# Patient Record
Sex: MALE | Race: ASIAN | NOT HISPANIC OR LATINO | Employment: FULL TIME | URBAN - METROPOLITAN AREA
[De-identification: names, ages, dates, MRNs, and addresses within clinical notes are randomized per-mention and may not be internally consistent; named-entity substitution may affect disease eponyms.]

---

## 2017-01-10 ENCOUNTER — OFFICE VISIT (OUTPATIENT)
Dept: DERMATOLOGY | Facility: CLINIC | Age: 57
End: 2017-01-10

## 2017-01-10 DIAGNOSIS — L40.9 PSORIASIS: Primary | ICD-10-CM

## 2017-01-10 ASSESSMENT — PAIN SCALES - GENERAL
PAINLEVEL: NO PAIN (0)
PAINLEVEL: NO PAIN (1)

## 2017-01-10 NOTE — NURSING NOTE
"Dermatology Rooming Note    Damian Tejada's goals for this visit include:   Chief Complaint   Patient presents with     Derm Problem     Damian is here today for a rash. He states \" I used the medication and it helped my rash\"             Linette Ramirez, TORI      "

## 2017-01-10 NOTE — PROGRESS NOTES
[facility] Dermatology Note      Dermatology Problem List:  1. Circular, erythematous plaque left scalp; likely isolated scalp psoriasis  2. Irritated seborrheic keratosis left cheek, LN2 on 11/29/16    Encounter Date: Emeterio 10, 2017    CC:    Chief Complaint    Patient presents with       Derm Problem        Follow up on scalp itch       History of Present Illness:  Mr. Damian Tejada is a 56 year old male here for followup on localized area of itchy spot on scalp.  First seen 11/19/16, suspected to be psoriasis or LSC, though felt important to cont ongoing clinical monitoring for other entities such as follicular mucinosis.  Today pt reports he used clobetasol oint bid for three weeks and has had significant improvement.  Also had several irritated SKs of face frozen at last visit, these have improved.      Past Medical History:    Patient Active Problem List    Diagnosis       Lumbago       Essential hypertension       ST elevation (STEMI) myocardial infarction involving left anterior descending coronary artery (H)       Hyperlipidemia LDL goal <70       Past Medical History     Past Medical History    Diagnosis  Date       Hypertension         Hyperlipidemia LDL goal <70         Coronary artery disease  8/14/16        anterior STEMI s/p NATY X2       BPH (benign prostatic hyperplasia)            Past Surgical History     Past Surgical History    Procedure  Laterality  Date       Hemorrhoidectomy    Summer 2008       Proctoplasty    Summer 2008       Rectal surgery               Social History:  The patient works as a  in [employer].     Family History:  There is no family history of skin cancer.    Medications:   Current Outpatient Prescriptions     Current Outpatient Prescriptions    Medication  Sig  Dispense  Refill       clobetasol (TEMOVATE) 0.05 % cream  Apply topically 2 times daily  60 g  0       clopidogrel (PLAVIX) 75 MG tablet  600 MG (8 pills) first day loading dose,  then 75 MG once daily.  98 tablet  3       aspirin EC 81 MG EC tablet  Take 1 tablet (81 mg) by mouth daily  90 tablet  3       nitroglycerin (NITROSTAT) 0.4 MG SL tablet  Place 1 tablet (0.4 mg) under the tongue every 5 minutes as needed for chest pain  25 tablet  3       atorvastatin (LIPITOR) 80 MG tablet  Take 1 tablet (80 mg) by mouth daily  90 tablet  3       carvedilol (COREG) 3.125 MG tablet  Take 1 tablet (3.125 mg) by mouth 2 times daily (with meals)  90 tablet  3       tamsulosin (FLOMAX) 0.4 MG 24 hr capsule  Take 1 capsule (0.4 mg) by mouth daily  90 capsule  3       lisinopril (PRINIVIL,ZESTRIL) 10 MG tablet  Take 1 tablet (10 mg) by mouth daily  30 tablet  1                       No Known Allergies      Review of Systems:  -Constitutional: The patient denies fatigue, fevers, chills, unintended weight loss, and night sweats.  -HEENT: Patient denies nonhealing oral sores.    Physical exam:  Vitals: There were no vitals taken for this visit.  GEN: This is a well developed, well-nourished male in no acute distress, in a pleasant mood.     SKIN: Focused examination of the face and head was performed.  -On the left parietal scalp, there is an isolated ~6 cm circular scaly, mildly erythematous plaque  -No other lesions of concern on areas examined.     Impression/Plan:  1. Circular, erythematous plaque left scalp; consistent with psoriasis or LSC.  Significantly improved with empiric clobetasol oint x 3 weeks.  May continue clobetasol oint bid on an as-needed basis.    2. SKs of face - improved with cryotherapy at last visit.  Reassurance as to benign nature.      Follow-up in 3 months, earlier for new or changing lesions.               Tacho Harvey MD  Dermatology Attending

## 2017-01-10 NOTE — PATIENT INSTRUCTIONS
Cryotherapy    What is it?    Use of a very cold liquid, such as liquid nitrogen, to freeze and destroy abnormal skin cells that need to be removed    What should I expect?    Tenderness and redness    A small blister that might grow and fill with dark purple blood. There may be crusting.    More than one treatment may be needed if the lesions do not go away.    How do I care for the treated area?    Gently wash the area with your hands when bathing.    Use a thin layer of Vaselin to help with healing. You may use a Band-Aid.     The area should heal within 7-10 days and may leave behind a pink or lighter color.     Do not use an antibiotic or Neosporin ointment.     You may take acetaminophen (Tylenol) for pain.     Call your Doctor if you have:    Severe pain    Signs of infection (warmth, redness, cloudy yellow drainage, and or a bad smell)    Questions or concerns    Who should I call with questions?       [facility]: [phone]       [facility]: [phone]       For urgent needs outside of business hours call the [facility] at [phone]        and ask for the dermatology resident on call

## 2017-01-10 NOTE — MR AVS SNAPSHOT
After Visit Summary   1/10/2017    Damian Tejada    MRN: [id number]           Patient Information     Date Of Birth          1960        Visit Information        Provider Department      1/10/2017 4:15 PM Tacho Harvey MD [facility]        Care Instructions    Cryotherapy    What is it?    Use of a very cold liquid, such as liquid nitrogen, to freeze and destroy abnormal skin cells that need to be removed    What should I expect?    Tenderness and redness    A small blister that might grow and fill with dark purple blood. There may be crusting.    More than one treatment may be needed if the lesions do not go away.    How do I care for the treated area?    Gently wash the area with your hands when bathing.    Use a thin layer of Vaselin to help with healing. You may use a Band-Aid.     The area should heal within 7-10 days and may leave behind a pink or lighter color.     Do not use an antibiotic or Neosporin ointment.     You may take acetaminophen (Tylenol) for pain.     Call your Doctor if you have:    Severe pain    Signs of infection (warmth, redness, cloudy yellow drainage, and or a bad smell)    Questions or concerns    Who should I call with questions?       [facility]: [phone]       [facility]: [phone]       For urgent needs outside of business hours call the [facility] at [phone]        and ask for the dermatology resident on call        Follow-ups after your visit        Your next 10 appointments already scheduled     Feb 23, 2017  4:15 PM   (Arrive by 4:00 PM)   Return Visit with Tacho Harvey MD   [facility] ([facility])    [address] [place] [id number] [phone]              Who to contact     Please call your clinic at [phone] to:    Ask questions about your health    Make or cancel appointments    Discuss your  medicines    Learn about your test results    Speak to your doctor   If you have compliments or concerns about an experience at your clinic, or if you wish to file a complaint, please contact [facility] Patient Relations at [phone] or email us at [email or web address]         Additional Information About Your Visit        Care EveryWhere ID     This is your Care EveryWhere ID. This could be used by other organizations to access your [facility] medical records  [id number]         Blood Pressure from Last 3 Encounters:   12/21/16 125/86   12/13/16 118/74   10/17/16 125/82    Weight from Last 3 Encounters:   12/21/16 83.915 kg (185 lb)   12/13/16 78.926 kg (174 lb)   10/17/16 83.915 kg (185 lb)              Today, you had the following     No orders found for display         Today's Medication Changes          These changes are accurate as of: 1/10/17  4:55 PM.  If you have any questions, ask your nurse or doctor.               Stop taking these medicines if you haven't already. Please contact your care team if you have questions.     clobetasol 0.05 % cream   Commonly known as:  TEMOVATE   Stopped by:  Tacho Harvey MD                    Primary Care Provider Office Phone # Fax #    Italo Helms [phone]       [address] [address]        Thank you!     Thank you for choosing [facility]  for your care. Our goal is always to provide you with excellent care. Hearing back from our patients is one way we can continue to improve our services. Please take a few minutes to complete the written survey that you may receive in the mail after your visit with us. Thank you!             Your Updated Medication List - Protect others around you: Learn how to safely use, store and throw away your medicines at www.disposemymeds.org.          This list is accurate as of: 1/10/17  4:55 PM.  Always use your most recent med  list.                   Brand Name Dispense Instructions for use    aspirin 81 MG EC tablet     90 tablet    Take 1 tablet (81 mg) by mouth daily       atorvastatin 80 MG tablet    LIPITOR    90 tablet    Take 1 tablet (80 mg) by mouth daily       carvedilol 3.125 MG tablet    COREG    90 tablet    Take 1 tablet (3.125 mg) by mouth 2 times daily (with meals)       clopidogrel 75 MG tablet    PLAVIX    98 tablet    600 MG (8 pills) first day loading dose, then 75 MG once daily.       nitroglycerin 0.4 MG sublingual tablet    NITROSTAT    25 tablet    Place 1 tablet (0.4 mg) under the tongue every 5 minutes as needed for chest pain       tamsulosin 0.4 MG capsule    FLOMAX    90 capsule    Take 1 capsule (0.4 mg) by mouth daily

## 2017-01-10 NOTE — Clinical Note
1/10/2017       RE: Damian Tejada  [address]  [place] [address]     Dear Colleague,    Thank you for referring your patient, Damian Tejada, to the [facility] DERMATOLOGY at [facility]. Please see a copy of my visit note below.    [facility] Dermatology Note      Dermatology Problem List:  1. Circular, erythematous plaque left scalp; likely isolated scalp psoriasis  2. Irritated seborrheic keratosis left cheek, LN2 on 11/29/16    Encounter Date: Emeterio 10, 2017    CC:    Chief Complaint    Patient presents with       Derm Problem        Follow up on scalp itch       History of Present Illness:  Mr. Damian Tejada is a 56 year old male here for followup on localized area of itchy spot on scalp.  First seen 11/19/16, suspected to be psoriasis or LSC, though felt important to cont ongoing clinical monitoring for other entities such as follicular mucinosis.  Today pt reports he used clobetasol oint bid for three weeks and has had significant improvement.  Also had several irritated SKs of face frozen at last visit, these have improved.      Past Medical History:    Patient Active Problem List    Diagnosis       Lumbago       Essential hypertension       ST elevation (STEMI) myocardial infarction involving left anterior descending coronary artery (H)       Hyperlipidemia LDL goal <70       Past Medical History     Past Medical History    Diagnosis  Date       Hypertension         Hyperlipidemia LDL goal <70         Coronary artery disease  8/14/16        anterior STEMI s/p NATY X2       BPH (benign prostatic hyperplasia)            Past Surgical History     Past Surgical History    Procedure  Laterality  Date       Hemorrhoidectomy    Summer 2008       Proctoplasty    Summer 2008       Rectal surgery               Social History:  The patient works as a  in [employer].     Family History:  There is no family history of skin  cancer.    Medications:   Current Outpatient Prescriptions     Current Outpatient Prescriptions    Medication  Sig  Dispense  Refill       clobetasol (TEMOVATE) 0.05 % cream  Apply topically 2 times daily  60 g  0       clopidogrel (PLAVIX) 75 MG tablet  600 MG (8 pills) first day loading dose, then 75 MG once daily.  98 tablet  3       aspirin EC 81 MG EC tablet  Take 1 tablet (81 mg) by mouth daily  90 tablet  3       nitroglycerin (NITROSTAT) 0.4 MG SL tablet  Place 1 tablet (0.4 mg) under the tongue every 5 minutes as needed for chest pain  25 tablet  3       atorvastatin (LIPITOR) 80 MG tablet  Take 1 tablet (80 mg) by mouth daily  90 tablet  3       carvedilol (COREG) 3.125 MG tablet  Take 1 tablet (3.125 mg) by mouth 2 times daily (with meals)  90 tablet  3       tamsulosin (FLOMAX) 0.4 MG 24 hr capsule  Take 1 capsule (0.4 mg) by mouth daily  90 capsule  3       lisinopril (PRINIVIL,ZESTRIL) 10 MG tablet  Take 1 tablet (10 mg) by mouth daily  30 tablet  1                       No Known Allergies      Review of Systems:  -Constitutional: The patient denies fatigue, fevers, chills, unintended weight loss, and night sweats.  -HEENT: Patient denies nonhealing oral sores.    Physical exam:  Vitals: There were no vitals taken for this visit.  GEN: This is a well developed, well-nourished male in no acute distress, in a pleasant mood.     SKIN: Focused examination of the face and head was performed.  -On the left parietal scalp, there is an isolated ~6 cm circular scaly, mildly erythematous plaque  -No other lesions of concern on areas examined.     Impression/Plan:  1. Circular, erythematous plaque left scalp; consistent with psoriasis or LSC.  Significantly improved with empiric clobetasol oint x 3 weeks.  May continue clobetasol oint bid on an as-needed basis.    2. SKs of face - improved with cryotherapy at last visit.  Reassurance as to benign nature.      Follow-up in 3 months, earlier for new or changing  lesions.               Tacho Harvey MD  Dermatology Attending

## 2017-02-23 ENCOUNTER — OFFICE VISIT (OUTPATIENT)
Dept: DERMATOLOGY | Facility: CLINIC | Age: 57
End: 2017-02-23

## 2017-02-23 DIAGNOSIS — L81.0 POSTINFLAMMATORY HYPERPIGMENTATION: Primary | ICD-10-CM

## 2017-02-23 DIAGNOSIS — L40.9 PSORIASIS: ICD-10-CM

## 2017-02-23 ASSESSMENT — PAIN SCALES - GENERAL: PAINLEVEL: NO PAIN (0)

## 2017-02-23 NOTE — PROGRESS NOTES
[facility] Dermatology Note      Dermatology Problem List:  1. Circular, erythematous plaque left scalp; possible isolated psoriasis. Resolved with clobetasol.  2. Irritated seborrheic keratosis left cheek: resolved with LN with some continued PIH.    Encounter Date: Feb 23, 2017    CC:   No chief complaint on file.        History of Present Illness:  Mr. Damian Tejada is a 57 year old male who presents as a follow-up for spot on his cheek. The patient was last seen 11/29/16 when he was started on clobetasol and treated with LN. Pt is concerned as he continues to have a dark spot on his cheek in area he was treated with LN for a SK. He is worried SK may have come back. Pt otherwise feels the spot on his scalp has resolved. Pt otherwise feels well without any changes in general state of health. Denies any new, growing, changing, bleeding, or otherwise concerning/symptomatic skin findings. No other questions or concerns today.        Past Medical History:   Patient Active Problem List   Diagnosis     Lumbago     Essential hypertension     ST elevation (STEMI) myocardial infarction involving left anterior descending coronary artery (H)     Hyperlipidemia LDL goal <70     Psoriasis     Seborrheic keratoses, inflamed     Past Medical History   Diagnosis Date     BPH (benign prostatic hyperplasia)      Coronary artery disease 8/14/16     anterior STEMI s/p NATY X2     Hyperlipidemia LDL goal <70      Hypertension      Past Surgical History   Procedure Laterality Date     Hemorrhoidectomy  Summer 2008     Proctoplasty  Summer 2008     Rectal surgery         Social History:  The patient works as a  in [employer].      Family History:  There is no family history of skin cancer.    Medications:  Current Outpatient Prescriptions   Medication Sig Dispense Refill     carvedilol (COREG) 3.125 MG tablet Take 1 tablet (3.125 mg) by mouth 2 times daily (with meals) 90 tablet 3     tamsulosin  (FLOMAX) 0.4 MG 24 hr capsule Take 1 capsule (0.4 mg) by mouth daily 90 capsule 3     clopidogrel (PLAVIX) 75 MG tablet 600 MG (8 pills) first day loading dose, then 75 MG once daily. 98 tablet 3     aspirin EC 81 MG EC tablet Take 1 tablet (81 mg) by mouth daily 90 tablet 3     nitroglycerin (NITROSTAT) 0.4 MG SL tablet Place 1 tablet (0.4 mg) under the tongue every 5 minutes as needed for chest pain 25 tablet 3     atorvastatin (LIPITOR) 80 MG tablet Take 1 tablet (80 mg) by mouth daily 90 tablet 3        No Known Allergies      Review of Systems:  -As per HPI  -Constitutional: The patient denies fatigue, fevers, chills, unintended weight loss, and night sweats.  -HEENT: Patient denies nonhealing oral sores.  -Skin: As above in HPI. No additional skin concerns.    Physical exam:  Vitals: There were no vitals taken for this visit.  GEN: This is a well developed, well-nourished male in no acute distress, in a pleasant mood.    SKIN: Focused examination of the scalp, face was performed.  - some minor flaking with no underlying residual patch or plaque on left scalp in area of previous concern for psoriasis   - hyperpigmented patch on central left cheek in area previously treated with LN  - no other lesions of concern on areas examined.     Impression/Plan:    1. Circular, erythematous plaque left scalp; suspected psoriasis. Resolved with Clobetasol. May continue using PRN     2. Post-inflammatory hyperpigmentation: In area that SK was previously treated with LN. Reassured patient that this will fade with time.         Follow-up prn for new or changing lesions.       Dr. Tacho Harvey staffed the patient.    Staff Involved:  Resident(Joey Walker)/Staff(as above)    I have seen and examined this patient and agree with the assessment and plan as documented in the resident's note.    Tacho Harvey MD  Dermatology Attending

## 2017-02-23 NOTE — NURSING NOTE
"Dermatology Rooming Note    Damian S Eastonon's goals for this visit include:   Chief Complaint   Patient presents with     Derm Problem     Damian is here today for spots on his face. He states \" I had a spot on my face that was treated with cryotherapy. The area looked good for one week than the color came back.\"           TORI Ontiveros    "

## 2017-02-23 NOTE — LETTER
2/23/2017       RE: Damian Tejada  [address]  [place] [address]     Dear Colleague,    Thank you for referring your patient, Damian Tejada, to the [facility] DERMATOLOGY at [facility]. Please see a copy of my visit note below.    [facility] Dermatology Note      Dermatology Problem List:  1. Circular, erythematous plaque left scalp; possible isolated psoriasis. Resolved with clobetasol.  2. Irritated seborrheic keratosis left cheek: resolved with LN with some continued PIH.    Encounter Date: Feb 23, 2017    CC:   No chief complaint on file.        History of Present Illness:  Mr. Damian Tejada is a 57 year old male who presents as a follow-up for spot on his cheek. The patient was last seen 11/29/16 when he was started on clobetasol and treated with LN. Pt is concerned as he continues to have a dark spot on his cheek in area he was treated with LN for a SK. He is worried SK may have come back. Pt otherwise feels the spot on his scalp has resolved. Pt otherwise feels well without any changes in general state of health. Denies any new, growing, changing, bleeding, or otherwise concerning/symptomatic skin findings. No other questions or concerns today.        Past Medical History:   Patient Active Problem List   Diagnosis     Lumbago     Essential hypertension     ST elevation (STEMI) myocardial infarction involving left anterior descending coronary artery (H)     Hyperlipidemia LDL goal <70     Psoriasis     Seborrheic keratoses, inflamed     Past Medical History   Diagnosis Date     BPH (benign prostatic hyperplasia)      Coronary artery disease 8/14/16     anterior STEMI s/p NATY X2     Hyperlipidemia LDL goal <70      Hypertension      Past Surgical History   Procedure Laterality Date     Hemorrhoidectomy  Summer 2008     Proctoplasty  Summer 2008     Rectal surgery         Social History:  The patient works as a  in [place].      Family History:  There is no family history of skin cancer.    Medications:  Current Outpatient Prescriptions   Medication Sig Dispense Refill     carvedilol (COREG) 3.125 MG tablet Take 1 tablet (3.125 mg) by mouth 2 times daily (with meals) 90 tablet 3     tamsulosin (FLOMAX) 0.4 MG 24 hr capsule Take 1 capsule (0.4 mg) by mouth daily 90 capsule 3     clopidogrel (PLAVIX) 75 MG tablet 600 MG (8 pills) first day loading dose, then 75 MG once daily. 98 tablet 3     aspirin EC 81 MG EC tablet Take 1 tablet (81 mg) by mouth daily 90 tablet 3     nitroglycerin (NITROSTAT) 0.4 MG SL tablet Place 1 tablet (0.4 mg) under the tongue every 5 minutes as needed for chest pain 25 tablet 3     atorvastatin (LIPITOR) 80 MG tablet Take 1 tablet (80 mg) by mouth daily 90 tablet 3        No Known Allergies      Review of Systems:  -As per HPI  -Constitutional: The patient denies fatigue, fevers, chills, unintended weight loss, and night sweats.  -HEENT: Patient denies nonhealing oral sores.  -Skin: As above in HPI. No additional skin concerns.    Physical exam:  Vitals: There were no vitals taken for this visit.  GEN: This is a well developed, well-nourished male in no acute distress, in a pleasant mood.    SKIN: Focused examination of the scalp, face was performed.  - some minor flaking with no underlying residual patch or plaque on left scalp in area of previous concern for psoriasis   - hyperpigmented patch on central left cheek in area previously treated with LN  - no other lesions of concern on areas examined.     Impression/Plan:    1. Circular, erythematous plaque left scalp; suspected psoriasis. Resolved with Clobetasol. May continue using PRN     2. Post-inflammatory hyperpigmentation: In area that SK was previously treated with LN. Reassured patient that this will fade with time.     Follow-up prn for new or changing lesions.     Dr. Tacho Harvey staffed the patient.    Staff Involved:  Resident(Joey  Wili Walker)/Staff(as above)    I have seen and examined this patient and agree with the assessment and plan as documented in the resident's note.    Tacho Harvey MD  Dermatology Attending

## 2017-02-23 NOTE — MR AVS SNAPSHOT
After Visit Summary   2/23/2017    Damian Tejada    MRN: [id number]           Patient Information     Date Of Birth          1960        Visit Information        Provider Department      2/23/2017 4:15 PM Tacho Harvey MD [facility]        Today's Diagnoses     Postinflammatory hyperpigmentation    -  1    Psoriasis           Follow-ups after your visit        Your next 10 appointments already scheduled     Apr 20, 2017  9:30 AM CDT   Walk In From ENT with Linh Pearson [facility] ([facility])    [address] [place] [address] [phone]            Apr 20, 2017 10:30 AM CDT   (Arrive by 10:15 AM)   New Patient Visit with Kwame Bain MD   [facility] ([facility])    [address] [place] [address] [phone]              Who to contact     Please call your clinic at [phone] to:    Ask questions about your health    Make or cancel appointments    Discuss your medicines    Learn about your test results    Speak to your doctor   If you have compliments or concerns about an experience at your clinic, or if you wish to file a complaint, please contact [facility] Patient Relations at [phone] or email us at [email or web address]         Additional Information About Your Visit        Care EveryWhere ID     This is your Care EveryWhere ID. This could be used by other organizations to access your [facility] medical records  [id number]         Blood Pressure from Last 3 Encounters:   03/05/17 (!) 152/94   12/21/16 125/86   12/13/16 118/74    Weight from Last 3 Encounters:   03/05/17 81.6 kg (180 lb)   12/21/16 83.9 kg (185 lb)   12/13/16 78.9 kg (174 lb)              Today, you had the following     No orders found for display       Primary Care Provider Office Phone # Fax #    Italo Lopez  MD Anal[place] [phone]       [address] [address]        Thank you!     Thank you for choosing [facility]  for your care. Our goal is always to provide you with excellent care. Hearing back from our patients is one way we can continue to improve our services. Please take a few minutes to complete the written survey that you may receive in the mail after your visit with us. Thank you!             Your Updated Medication List - Protect others around you: Learn how to safely use, store and throw away your medicines at www.disposemymeds.org.          This list is accurate as of: 2/23/17 11:59 PM.  Always use your most recent med list.                   Brand Name Dispense Instructions for use    aspirin 81 MG EC tablet     90 tablet    Take 1 tablet (81 mg) by mouth daily       atorvastatin 80 MG tablet    LIPITOR    90 tablet    Take 1 tablet (80 mg) by mouth daily       carvedilol 3.125 MG tablet    COREG    90 tablet    Take 1 tablet (3.125 mg) by mouth 2 times daily (with meals)       clopidogrel 75 MG tablet    PLAVIX    98 tablet    600 MG (8 pills) first day loading dose, then 75 MG once daily.       nitroglycerin 0.4 MG sublingual tablet    NITROSTAT    25 tablet    Place 1 tablet (0.4 mg) under the tongue every 5 minutes as needed for chest pain       tamsulosin 0.4 MG capsule    FLOMAX    90 capsule    Take 1 capsule (0.4 mg) by mouth daily

## 2017-03-05 ENCOUNTER — HOSPITAL ENCOUNTER (EMERGENCY)
Facility: CLINIC | Age: 57
Discharge: HOME | End: 2017-03-05
Attending: EMERGENCY MEDICINE | Admitting: EMERGENCY MEDICINE
Payer: COMMERCIAL

## 2017-03-05 VITALS
WEIGHT: 180 LBS | RESPIRATION RATE: 20 BRPM | HEART RATE: 81 BPM | SYSTOLIC BLOOD PRESSURE: 152 MMHG | DIASTOLIC BLOOD PRESSURE: 94 MMHG | TEMPERATURE: 97.6 F | BODY MASS INDEX: 28.25 KG/M2 | OXYGEN SATURATION: 100 % | HEIGHT: 67 IN

## 2017-03-05 DIAGNOSIS — H93.12 TINNITUS OF LEFT EAR: ICD-10-CM

## 2017-03-05 PROCEDURE — 99282 EMERGENCY DEPT VISIT SF MDM: CPT | Mod: Z6 | Performed by: EMERGENCY MEDICINE

## 2017-03-05 PROCEDURE — 99282 EMERGENCY DEPT VISIT SF MDM: CPT | Performed by: EMERGENCY MEDICINE

## 2017-03-05 ASSESSMENT — ENCOUNTER SYMPTOMS
TREMORS: 0
LIGHT-HEADEDNESS: 0
DIZZINESS: 0
ABDOMINAL PAIN: 0
NUMBNESS: 0
WEAKNESS: 0
FEVER: 0
SHORTNESS OF BREATH: 0
WOUND: 0
FACIAL ASYMMETRY: 0

## 2017-03-05 NOTE — ED NOTES
"Pt A&Ox4. Pt reports \"hissing\" sound in left ear that started about 3 weeks ago. Initially the sound would occur in the early morning before falling asleep and would not be present upon awaking. Now the \"hissing\" in his left ear has been constant x2 days. Pt denies hearing loss. No recent illness or injury.  "

## 2017-03-05 NOTE — ED AVS SNAPSHOT
[facility], Emergency Department    [address] [address]    Phone:  [phone]                                       Damian Tejada   MRN: [id number]    Department:  [facility], Emergency Department   Date of Visit:  3/5/2017           Patient Information     Date Of Birth          1960        Your diagnoses for this visit were:     Tinnitus of left ear        You were seen by Torin Byrd MD.      Follow-up Information     Call Italo Helms MD.    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  This is most likely tinnitus.  You need to see an ear, nose, and throat doctor (ENT or Otolarynologist) to better determine what is causing your symptoms    Please make an appointment to follow up with [facility] (phone: [phone]) as soon as possible    Call your primary care doctor in 1 day to let them know you were seen in the ED.  Follow up with them as well.               Discharge References/Attachments     TINNITUS (RINGING IN THE EARS) (ENGLISH)      24 Hour Appointment Hotline       To make an appointment at any [facility] clinic, call [phone] ([phone]). If you don't have a family doctor or clinic, we will help you find one. [facility] clinics are conveniently located to serve the needs of you and your family.             Review of your medicines      Our records show that you are taking the medicines listed below. If these are incorrect, please call your family doctor or clinic.        Dose / Directions Last dose taken    aspirin 81 MG EC tablet   Dose:  81 mg   Quantity:  90 tablet        Take 1 tablet (81 mg) by mouth daily   Refills:  3        atorvastatin 80 MG tablet   Commonly  known as:  LIPITOR   Dose:  80 mg   Quantity:  90 tablet        Take 1 tablet (80 mg) by mouth daily   Refills:  3        carvedilol 3.125 MG tablet   Commonly known as:  COREG   Dose:  3.125 mg   Quantity:  90 tablet        Take 1 tablet (3.125 mg) by mouth 2 times daily (with meals)   Refills:  3        clopidogrel 75 MG tablet   Commonly known as:  PLAVIX   Quantity:  98 tablet        600 MG (8 pills) first day loading dose, then 75 MG once daily.   Refills:  3        nitroglycerin 0.4 MG sublingual tablet   Commonly known as:  NITROSTAT   Dose:  0.4 mg   Quantity:  25 tablet        Place 1 tablet (0.4 mg) under the tongue every 5 minutes as needed for chest pain   Refills:  3        tamsulosin 0.4 MG capsule   Commonly known as:  FLOMAX   Dose:  0.4 mg   Quantity:  90 capsule        Take 1 capsule (0.4 mg) by mouth daily   Refills:  3                Orders Needing Specimen Collection     None      Pending Results     No orders found from 3/3/2017 to 3/6/2017.            Pending Culture Results     No orders found from 3/3/2017 to 3/6/2017.            Thank you for choosing [facility]       Thank you for choosing [facility] for your care. Our goal is always to provide you with excellent care. Hearing back from our patients is one way we can continue to improve our services. Please take a few minutes to complete the written survey that you may receive in the mail after you visit with us. Thank you!        Care EveryWhere ID     This is your Care EveryWhere ID. This could be used by other organizations to access your [facility] medical records  [id number]        After Visit Summary       This is your record. Keep this with you and show to your community pharmacist(s) and doctor(s) at your next visit.

## 2017-03-05 NOTE — ED AVS SNAPSHOT
[facility], [place], Emergency Department    [address] [place] [address]    Phone:  [phone]                                       Damian Tejada   MRN: [id number]    Department:  [facility], Emergency Department   Date of Visit:  3/5/2017           After Visit Summary Signature Page     I have received my discharge instructions, and my questions have been answered. I have discussed any challenges I see with this plan with the nurse or doctor.    ..........................................................................................................................................  Patient/Patient Representative Signature      ..........................................................................................................................................  Patient Representative Print Name and Relationship to Patient    ..................................................               ................................................  Date                                            Time    ..........................................................................................................................................  Reviewed by Signature/Title    ...................................................              ..............................................  Date                                                            Time

## 2017-03-05 NOTE — ED PROVIDER NOTES
History     Chief Complaint   Patient presents with     Hearing Problem     HPI  Damian Tejada is a 57 year old male with a history of hyperlipidemia, hypertension and MI who presents to the Emergency Department with abnormal hearing sensations out of his left ear. Patient states intermittently over the past 3 weeks he has noticed a hissing sound right before he goes to bed. He has been on vacation but working on computer programing projects late at night and so he has been up until 4 or 5 AM. He has noticed that around 4 or 5 AM he will hear a hissing sound in his left ear before he goes to bed. He has not experienced this sensation throughout the day at all. Over the past 2 days the hissing has been constant, located in his left ear. He also has this fullness sensation on the left side of his head. He denies any ear pain, headache, vision changes, balance difficulties or problems with coordination or walking, nausea, vomiting. Has never had symptoms like this before. No history of stroke. No recent congestion, sore throat or fevers.     This part of the document was transcribed by Bebe Holcomb, Medical Scribe.  Past Medical History   Diagnosis Date     BPH (benign prostatic hyperplasia)      Coronary artery disease 8/14/16     anterior STEMI s/p NATY X2     Hyperlipidemia LDL goal <70      Hypertension        Past Surgical History   Procedure Laterality Date     Hemorrhoidectomy  Summer 2008     Proctoplasty  Summer 2008     Rectal surgery         Family History   Problem Relation Age of Onset     Skin Cancer No family hx of      Melanoma No family hx of        Social History   Substance Use Topics     Smoking status: Never Smoker     Smokeless tobacco: Never Used     Alcohol use No       No current facility-administered medications for this encounter.      Current Outpatient Prescriptions   Medication     carvedilol (COREG) 3.125 MG tablet     tamsulosin (FLOMAX) 0.4 MG 24 hr capsule     clopidogrel (PLAVIX) 75  "MG tablet     aspirin EC 81 MG EC tablet     nitroglycerin (NITROSTAT) 0.4 MG SL tablet     atorvastatin (LIPITOR) 80 MG tablet      No Known Allergies     I have reviewed the Medications, Allergies, Past Medical and Surgical History, and Social History in the Epic system.    Review of Systems   Constitutional: Negative for fever.   HENT: Positive for tinnitus. Negative for congestion.    Respiratory: Negative for shortness of breath.    Cardiovascular: Negative for chest pain.   Gastrointestinal: Negative for abdominal pain.   Skin: Negative for wound.   Neurological: Negative for dizziness, tremors, facial asymmetry, weakness, light-headedness and numbness.   All other systems reviewed and are negative.    ROS: 10 point ROS neg other than the symptoms noted above in the HPI.    Physical Exam   BP: (!) 152/94  Pulse: 81  Temp: 97.6  F (36.4  C)  Resp: 20  Height: 170.2 cm (5' 7\")  Weight: 81.6 kg (180 lb)  SpO2: 100 %  Physical Exam   Constitutional: He is oriented to person, place, and time. He appears well-developed and well-nourished. No distress.   HENT:   Head: Normocephalic and atraumatic.   Right Ear: No drainage. No foreign bodies. No mastoid tenderness. Tympanic membrane is not scarred, not perforated, not erythematous and not retracted. No middle ear effusion. No hemotympanum. No decreased hearing is noted.   Left Ear: No drainage. No foreign bodies. Tympanic membrane is scarred. Tympanic membrane is not perforated, not erythematous and not retracted.  No middle ear effusion. No hemotympanum. No decreased hearing is noted.   Ears:    Mouth/Throat: Oropharynx is clear and moist. No oropharyngeal exudate.   Eyes: Conjunctivae and EOM are normal. Pupils are equal, round, and reactive to light.   Neck: Normal range of motion. Neck supple.   Cardiovascular: Normal rate, regular rhythm, normal heart sounds and intact distal pulses.    No murmur heard.  Pulmonary/Chest: Effort normal and breath sounds normal. No " respiratory distress. He has no wheezes. He has no rales.   Abdominal: Soft. Bowel sounds are normal. He exhibits no distension. There is no tenderness. There is no rebound and no guarding.   Musculoskeletal: Normal range of motion. He exhibits no edema or tenderness.   Neurological: He is alert and oriented to person, place, and time. He exhibits normal muscle tone.   No pronator drift, normal strength/sensation all extremities, normal finger to nose, normal gait\     Skin: Skin is warm and dry. He is not diaphoretic.   Psychiatric: He has a normal mood and affect. His behavior is normal. Judgment and thought content normal.   Nursing note and vitals reviewed.      ED Course     ED Course     Procedures            Critical Care time:               Labs Ordered and Resulted from Time of ED Arrival Up to the Time of Departure from the ED - No data to display    Assessments & Plan (with Medical Decision Making)   57 year old male with hearing changes. His symptoms sound like tinnitus by the way he describes them. He has some tympanosclerosis to his left TM but otherwise he appears normal. He has a normal neuro exam in the Emergency Department. He does not have any other focal nerve symptoms. I do not think he has any kind of mass  lesion that is causing this tinnitus or warrants emergent imaging today. He is not on any medications that are particularly ototoxic that could be causing this. he did have a recent visit a few weeks ago where he seemed to have some nausea, dizziness. I am not sure if that was true vertigo at that time or not but this might be related to some underlying inner ear problem that is going on. At this point, will discharge him with otolaryngology follow up to get further hearing tests and also follow up with his primary-care provider. Patient agreed to this plan. Discharged in stable condition.     ddx- tinnitus, meniere's disease, other as above    I have reviewed the nursing notes.  I have  reviewed the findings, diagnosis, plan and need for follow up with the patient.  This part of the document was transcribed by Bebe Holcomb, Medical Scribe.  Discharge Medication List as of 3/5/2017 12:27 PM          Final diagnoses:   Tinnitus of left ear       3/5/2017   [facility], [place], EMERGENCY DEPARTMENT     Torin Byrd MD  03/05/17 [id number]

## 2017-03-05 NOTE — DISCHARGE INSTRUCTIONS
You came to the ED with hissing in your left ear.  This is most likely tinnitus.  You need to see an ear, nose, and throat doctor (ENT or Otolarynologist) to better determine what is causing your symptoms    Please make an appointment to follow up with [facility] (phone: [phone]) as soon as possible    Call your primary care doctor in 1 day to let them know you were seen in the ED.  Follow up with them as well.

## 2017-03-07 PROBLEM — L81.0 POSTINFLAMMATORY HYPERPIGMENTATION: Status: ACTIVE | Noted: 2017-03-07

## 2017-04-10 ENCOUNTER — PRE VISIT (OUTPATIENT)
Dept: OTOLARYNGOLOGY | Facility: CLINIC | Age: 57
End: 2017-04-10

## 2017-04-10 NOTE — TELEPHONE ENCOUNTER
1.  Date/reason for appt: 4/20/17 - tinnitus  2.  Referring provider: Self  3.  Call to patient (Yes / No - short description): No, recs in epic  4.  Previous care at:   INTERNAL:   - [facility] ER on 3/5/17

## 2017-04-19 DIAGNOSIS — H93.19 TINNITUS: Primary | ICD-10-CM

## 2017-04-20 ENCOUNTER — OFFICE VISIT (OUTPATIENT)
Dept: OTOLARYNGOLOGY | Facility: CLINIC | Age: 57
End: 2017-04-20

## 2017-04-20 ENCOUNTER — OFFICE VISIT (OUTPATIENT)
Dept: AUDIOLOGY | Facility: CLINIC | Age: 57
End: 2017-04-20

## 2017-04-20 VITALS — WEIGHT: 176 LBS | BODY MASS INDEX: 27.62 KG/M2 | HEIGHT: 67 IN

## 2017-04-20 DIAGNOSIS — K21.9 GASTROESOPHAGEAL REFLUX DISEASE WITHOUT ESOPHAGITIS: ICD-10-CM

## 2017-04-20 DIAGNOSIS — H93.12 TINNITUS, LEFT: Primary | ICD-10-CM

## 2017-04-20 DIAGNOSIS — H90.5 SENSORINEURAL HEARING LOSS: Primary | ICD-10-CM

## 2017-04-20 ASSESSMENT — PAIN SCALES - GENERAL: PAINLEVEL: NO PAIN (0)

## 2017-04-20 NOTE — MR AVS SNAPSHOT
After Visit Summary   4/20/2017    Damian Tejada    MRN: [id number]           Patient Information     Date Of Birth          1960        Visit Information        Provider Department      4/20/2017 10:30 AM Kwame Bain MD [facility] Ear Nose and Throat        Today's Diagnoses     Tinnitus, left    -  1    Gastroesophageal reflux disease without esophagitis          Care Instructions    The patient presents with a history of gastroesophageal reflux and left ear tinnitus.  I have discussed with the patient dietary restrictions including avoiding eating spicy foods, greasy foods, and soda and well as the need to avoid caffeine and alcohol.  The patient and I have discussed avoiding eating within two hours of sleeping or lying down to rest.  The patient will also use zantac in the morning and zantac at night.  The patient will be seen again in six months to assess response to these efforts to control the symptoms. For his unilateral tinnitus, the patient will be referred for an MRI scan of the head and a consultation with Dr. Rick Nissen.         Follow-ups after your visit        Your next 10 appointments already scheduled     Apr 20, 2017  4:45 PM CDT   (Arrive by 4:30 PM)   MR BRAIN W/O & W CONTRAST with [facility] ([facility] [place])    [address] [address] [phone]           Take your medicines as usual, unless your doctor tells you not to. Bring a list of your current medicines to your exam (including vitamins, minerals and over-the-counter drugs).  You will be given intravenous contrast for this exam. To prepare:   The day before your exam, drink extra fluids at least six 8-ounce glasses (unless your doctor tells you to restrict your fluids).   Have a blood test (creatinine test) within 30 days of your exam. Go to your clinic or Diagnostic Imaging Department for this test.  The MRI machine uses a  strong magnet. Please wear clothes without metal (snaps, zippers). A sweatsuit works well, or we may give you a hospital gown.  Please remove any body piercings and hair extensions before you arrive. You will also remove watches, jewelry, hairpins, wallets, dentures, partial dental plates and hearing aids. You may wear contact lenses, and you may be able to wear your rings. We have a safe place to keep your personal items, but it is safer to leave them at home.   **IMPORTANT** THE INSTRUCTIONS BELOW ARE ONLY FOR THOSE PATIENTS WHO HAVE BEEN TOLD THEY WILL RECEIVE SEDATION OR GENERAL ANESTHESIA DURING THEIR MRI PROCEDURE:  IF YOU WILL RECEIVE SEDATION (take medicine to help you relax during your exam):   You must get the medicine from your doctor before you arrive. Bring the medicine to the exam. Do not take it at home.   Arrive one hour early. Bring someone who can take you home after the test. Your medicine will make you sleepy. After the exam, you may not drive, take a bus or take a taxi by yourself.   No eating 8 hours before your exam. You may have clear liquids up until 4 hours before your exam. (Clear liquids include water, clear tea, black coffee and fruit juice without pulp.)  IF YOU WILL RECEIVE ANESTHESIA (be asleep for your exam):   Arrive 1 1/2 hours early. Bring someone who can take you home after the test. You may not drive, take a bus or take a taxi by yourself.   No eating 8 hours before your exam. You may have clear liquids up until 4 hours before your exam. (Clear liquids include water, clear tea, black coffee and fruit juice without pulp.)  Please call the Imaging Department at your exam site with any questions.            May 30, 2017  8:00 AM CDT   (Arrive by 7:45 AM)   NEW NEUROTOLOGY VISIT with Rick L Nissen, MD   [facility] Ear Nose and Throat ([facility])    [address] [address] [phone]            Oct 20, 2017  4:30 PM CDT    (Arrive by 4:15 PM)   Return Visit with Kwame Bain MD   [facility] Ear Nose and Throat ([facility] [place])    [address]  [address] [facility] [address] [phone]              Future tests that were ordered for you today     Open Future Orders        Priority Expected Expires Ordered    Urea nitrogen Routine  2018    Creatinine Routine  2018    MRI Brain and Skull Base w & w/o contrast Routine  2018            Who to contact     Please call your clinic at [phone] to:    Ask questions about your health    Make or cancel appointments    Discuss your medicines    Learn about your test results    Speak to your doctor   If you have compliments or concerns about an experience at your clinic, or if you wish to file a complaint, please contact [facility] Patient Relations at [phone] or email us at [email or web address]         Additional Information About Your Visit        ADVANCE MedicalharAddThis Information     Prescreen is an electronic gateway that provides easy, online access to your medical records. With Prescreen, you can request a clinic appointment, read your test results, renew a prescription or communicate with your care team.     To sign up for Prescreen visit the website at [email or web address]   You will be asked to enter the access code listed below, as well as some personal information. Please follow the directions to create your username and password.     Your access code is: [email or web address]  Expires: 2017  9:52 AM     Your access code will  in 90 days. If you need help or a new code, please contact your [facility] Clinic or call [phone] for assistance.        Care EveryWhere ID     This is your Care EveryWhere ID. This could be used by other organizations to access your [place] medical records  [id number]        Your Vitals Were     Height  "BMI (Body Mass Index)                1.7 m (5' 6.93\") 27.62 kg/m2           Blood Pressure from Last 3 Encounters:   03/05/17 (!) 152/94   12/21/16 125/86   12/13/16 118/74    Weight from Last 3 Encounters:   04/20/17 79.8 kg (176 lb)   03/05/17 81.6 kg (180 lb)   12/21/16 83.9 kg (185 lb)                 Today's Medication Changes          These changes are accurate as of: 4/20/17 11:37 AM.  If you have any questions, ask your nurse or doctor.               Start taking these medicines.        Dose/Directions    ranitidine 150 MG tablet   Commonly known as:  ZANTAC   Used for:  Gastroesophageal reflux disease without esophagitis   Started by:  Kwame Bain MD        Dose:  150 mg   Take 1 tablet (150 mg) by mouth 2 times daily Take one tablet twice daily with second dose just prior to the night time sleep period   Quantity:  120 tablet   Refills:  3            Where to get your medicines      These medications were sent to [facility][id number] - [place], [place] - [address] [place] [address]  [address] [address]     Phone:  [phone]     ranitidine 150 MG tablet                Primary Care Provider Office Phone # Fax #    Italo Helms [phone]       [address] [address]        Thank you!     Thank you for choosing [facility] EAR NOSE AND THROAT  for your care. Our goal is always to provide you with excellent care. Hearing back from our patients is one way we can continue to improve our services. Please take a few minutes to complete the written survey that you may receive in the mail after your visit with us. Thank you!             Your Updated Medication List - Protect others around you: Learn how to safely use, store and throw away your medicines at www.disposemymeds.org.          This list is accurate as of: 4/20/17 11:37 AM.  Always use your most recent med list.                   Brand Name Dispense Instructions for use    aspirin 81 MG " EC tablet     90 tablet    Take 1 tablet (81 mg) by mouth daily       atorvastatin 80 MG tablet    LIPITOR    90 tablet    Take 1 tablet (80 mg) by mouth daily       carvedilol 3.125 MG tablet    COREG    90 tablet    Take 1 tablet (3.125 mg) by mouth 2 times daily (with meals)       clopidogrel 75 MG tablet    PLAVIX    98 tablet    600 MG (8 pills) first day loading dose, then 75 MG once daily.       nitroglycerin 0.4 MG sublingual tablet    NITROSTAT    25 tablet    Place 1 tablet (0.4 mg) under the tongue every 5 minutes as needed for chest pain       ranitidine 150 MG tablet    ZANTAC    120 tablet    Take 1 tablet (150 mg) by mouth 2 times daily Take one tablet twice daily with second dose just prior to the night time sleep period       tamsulosin 0.4 MG capsule    FLOMAX    90 capsule    Take 1 capsule (0.4 mg) by mouth daily

## 2017-04-20 NOTE — MR AVS SNAPSHOT
After Visit Summary   2017    Damian Tejada    MRN: [id number]           Patient Information     Date Of Birth          1960        Visit Information        Provider Department      2017 9:30 AM Mishel Ramachandran [facility]        Today's Diagnoses     Sensorineural hearing loss    -  1       Follow-ups after your visit        Your next 10 appointments already scheduled     2017 10:30 AM CDT   (Arrive by 10:15 AM)   New Patient Visit with Kwame Bain MD   [facility] Ear Nose and Throat ([facility] [place])    [address] [place] [address] [phone]              Who to contact     Please call your clinic at [phone] to:    Ask questions about your health    Make or cancel appointments    Discuss your medicines    Learn about your test results    Speak to your doctor   If you have compliments or concerns about an experience at your clinic, or if you wish to file a complaint, please contact [facility] Patient Relations at [phone] or email us at [email or web address]         Additional Information About Your Visit        MyChart Information     [employer] is an electronic gateway that provides easy, online access to your medical records. With Scoreloop, you can request a clinic appointment, read your test results, renew a prescription or communicate with your care team.     To sign up for [employer] visit the website at [email or web address]   You will be asked to enter the access code listed below, as well as some personal information. Please follow the directions to create your username and password.     Your access code is: [email or web address]  Expires: 2017  9:52 AM     Your access code will  in 90 days. If you need help or a new code, please contact your [facility] or call [phone] for assistance.        Care EveryWhere ID      This is your Care EveryWhere ID. This could be used by other organizations to access your [place] medical records  [id number]         Blood Pressure from Last 3 Encounters:   03/05/17 (!) 152/94   12/21/16 125/86   12/13/16 118/74    Weight from Last 3 Encounters:   03/05/17 81.6 kg (180 lb)   12/21/16 83.9 kg (185 lb)   12/13/16 78.9 kg (174 lb)              We Performed the Following     [facility] Audiometry Thrshld Eval & Speech Recog ([address])     Tymps / Reflex   ([id number])        Primary Care Provider Office Phone # Fax #    Italo Helms [phone]       [address] [address]        Thank you!     Thank you for choosing [facility]UD[facility]  for your care. Our goal is always to provide you with excellent care. Hearing back from our patients is one way we can continue to improve our services. Please take a few minutes to complete the written survey that you may receive in the mail after your visit with us. Thank you!             Your Updated Medication List - Protect others around you: Learn how to safely use, store and throw away your medicines at www.disposemymeds.org.          This list is accurate as of: 4/20/17  9:52 AM.  Always use your most recent med list.                   Brand Name Dispense Instructions for use    aspirin 81 MG EC tablet     90 tablet    Take 1 tablet (81 mg) by mouth daily       atorvastatin 80 MG tablet    LIPITOR    90 tablet    Take 1 tablet (80 mg) by mouth daily       carvedilol 3.125 MG tablet    COREG    90 tablet    Take 1 tablet (3.125 mg) by mouth 2 times daily (with meals)       clopidogrel 75 MG tablet    PLAVIX    98 tablet    600 MG (8 pills) first day loading dose, then 75 MG once daily.       nitroglycerin 0.4 MG sublingual tablet    NITROSTAT    25 tablet    Place 1 tablet (0.4 mg) under the tongue every 5 minutes as needed for chest pain       tamsulosin 0.4 MG capsule    FLOMAX    90 capsule    Take 1 capsule  (0.4 mg) by mouth daily

## 2017-04-20 NOTE — PATIENT INSTRUCTIONS
The patient presents with a history of gastroesophageal reflux and left ear tinnitus.  I have discussed with the patient dietary restrictions including avoiding eating spicy foods, greasy foods, and soda and well as the need to avoid caffeine and alcohol.  The patient and I have discussed avoiding eating within two hours of sleeping or lying down to rest.  The patient will also use zantac in the morning and zantac at night.  The patient will be seen again in six months to assess response to these efforts to control the symptoms. For his unilateral tinnitus, the patient will be referred for an MRI scan of the head and a consultation with Dr. Rick Nissen.

## 2017-04-20 NOTE — LETTER
4/20/2017       RE: Damian Tejada  [address]  [facility] [address]     Dear Colleague,    Thank you for referring your patient, Damian Tejada, to the [facility] EAR NOSE AND THROAT at [facility]. Please see a copy of my visit note below.    The patient presents with a history of a globus sensation and mild dysphagia.  The patient denies odynophagia, hemoptysis, hematemasis, or unexplained weight loss.  The patient reports that the symptoms have been progressively worsening over the past few weeks.  The patient reports symptoms of heartburn intermittently.  The patient also reports left ear tinnitus that began suddenly a few weeks ago. He denies dizziness. The patient denies sinusitis, rhinitis, facial pain, nasal obstruction or purulent nasal discharge. The patient denies chronic or recurrent tonsillitis, chronic or recurrent pharyngitis. The patient denies otalgia, otorrhea, eustachian tube dysfunction or ear infections. His Audiogram and Tympanogram demonstrate bilateral mild sensorineural hearing loss with very good word recognition scores and some negative pressure in the middle ears.     This patient is seen in consultation as a self referral to my clinic.    All other systems were reviewed and they are either negative or they are not directly pertinent to this Otolaryngology examination.      Past Medical History:    Past Medical History:   Diagnosis Date     BPH (benign prostatic hyperplasia)      Coronary artery disease 8/14/16    anterior STEMI s/p NATY X2     Hyperlipidemia LDL goal <70      Hypertension        Past Surgical History:    Past Surgical History:   Procedure Laterality Date     HEMORRHOIDECTOMY  Summer 2008     PROCTOPLASTY  Summer 2008     RECTAL SURGERY         Medications:      Current Outpatient Prescriptions:      carvedilol (COREG) 3.125 MG tablet, Take 1 tablet (3.125 mg) by mouth 2 times daily (with meals), Disp: 90 tablet, Rfl: 3      tamsulosin (FLOMAX) 0.4 MG 24 hr capsule, Take 1 capsule (0.4 mg) by mouth daily, Disp: 90 capsule, Rfl: 3     clopidogrel (PLAVIX) 75 MG tablet, 600 MG (8 pills) first day loading dose, then 75 MG once daily., Disp: 98 tablet, Rfl: 3     aspirin EC 81 MG EC tablet, Take 1 tablet (81 mg) by mouth daily, Disp: 90 tablet, Rfl: 3     nitroglycerin (NITROSTAT) 0.4 MG SL tablet, Place 1 tablet (0.4 mg) under the tongue every 5 minutes as needed for chest pain, Disp: 25 tablet, Rfl: 3     atorvastatin (LIPITOR) 80 MG tablet, Take 1 tablet (80 mg) by mouth daily, Disp: 90 tablet, Rfl: 3    Allergies:    Review of patient's allergies indicates no known allergies.    Physical Examination:    The patient is a well developed, well nourished male in no apparent distress.  He is normocephalic, atraumatic with pupils equally round and reactive to light.    Oral Cavity Examination:  Normal mucosa with no masses or lesions  Nasal Examination: Normal mucosa with no masses or lesions  Ear Examination: Ear canals clear, tympanic membranes and middle ear spaces normal  Neurological Examination: Facial nerve function intact and symmetric  Integumentary Examination: No lesions on the skin of the head and neck  Neck Examination: No masses or lesions, no lymphadenopathy  Endocrine Examination: Normal thyroid examination      Assessment and Plan:    The patient presents with a history of gastroesophageal reflux and left ear tinnitus.  I have discussed with the patient dietary restrictions including avoiding eating spicy foods, greasy foods, and soda and well as the need to avoid caffeine and alcohol.  The patient and I have discussed avoiding eating within two hours of sleeping or lying down to rest.  The patient will also use zantac in the morning and zantac at night.  The patient will be seen again in six months to assess response to these efforts to control the symptoms. For his unilateral tinnitus, the patient will be referred for an  MRI scan of the head and a consultation with Dr. Rick Nissen.     Again, thank you for allowing me to participate in the care of your patient.      Sincerely,    Kwame Bain MD

## 2017-04-20 NOTE — PROGRESS NOTES
AUDIOLOGY REPORT    SUMMARY: Audiology visit completed. See audiogram for results.      RECOMMENDATIONS: Follow-up with ENT.    Amaris Bass.  Licensed Audiologist  [place] #[id number]

## 2017-04-20 NOTE — PROGRESS NOTES
The patient presents with a history of a globus sensation and mild dysphagia.  The patient denies odynophagia, hemoptysis, hematemasis, or unexplained weight loss.  The patient reports that the symptoms have been progressively worsening over the past few weeks.  The patient reports symptoms of heartburn intermittently.  The patient also reports left ear tinnitus that began suddenly a few weeks ago. He denies dizziness. The patient denies sinusitis, rhinitis, facial pain, nasal obstruction or purulent nasal discharge. The patient denies chronic or recurrent tonsillitis, chronic or recurrent pharyngitis. The patient denies otalgia, otorrhea, eustachian tube dysfunction or ear infections. His Audiogram and Tympanogram demonstrate bilateral mild sensorineural hearing loss with very good word recognition scores and some negative pressure in the middle ears.     This patient is seen in consultation as a self referral to my clinic.    All other systems were reviewed and they are either negative or they are not directly pertinent to this Otolaryngology examination.      Past Medical History:    Past Medical History:   Diagnosis Date     BPH (benign prostatic hyperplasia)      Coronary artery disease 8/14/16    anterior STEMI s/p NATY X2     Hyperlipidemia LDL goal <70      Hypertension        Past Surgical History:    Past Surgical History:   Procedure Laterality Date     HEMORRHOIDECTOMY  Summer 2008     PROCTOPLASTY  Summer 2008     RECTAL SURGERY         Medications:      Current Outpatient Prescriptions:      carvedilol (COREG) 3.125 MG tablet, Take 1 tablet (3.125 mg) by mouth 2 times daily (with meals), Disp: 90 tablet, Rfl: 3     tamsulosin (FLOMAX) 0.4 MG 24 hr capsule, Take 1 capsule (0.4 mg) by mouth daily, Disp: 90 capsule, Rfl: 3     clopidogrel (PLAVIX) 75 MG tablet, 600 MG (8 pills) first day loading dose, then 75 MG once daily., Disp: 98 tablet, Rfl: 3     aspirin EC 81 MG EC tablet, Take 1 tablet (81 mg) by  mouth daily, Disp: 90 tablet, Rfl: 3     nitroglycerin (NITROSTAT) 0.4 MG SL tablet, Place 1 tablet (0.4 mg) under the tongue every 5 minutes as needed for chest pain, Disp: 25 tablet, Rfl: 3     atorvastatin (LIPITOR) 80 MG tablet, Take 1 tablet (80 mg) by mouth daily, Disp: 90 tablet, Rfl: 3    Allergies:    Review of patient's allergies indicates no known allergies.    Physical Examination:    The patient is a well developed, well nourished male in no apparent distress.  He is normocephalic, atraumatic with pupils equally round and reactive to light.    Oral Cavity Examination:  Normal mucosa with no masses or lesions  Nasal Examination: Normal mucosa with no masses or lesions  Ear Examination: Ear canals clear, tympanic membranes and middle ear spaces normal  Neurological Examination: Facial nerve function intact and symmetric  Integumentary Examination: No lesions on the skin of the head and neck  Neck Examination: No masses or lesions, no lymphadenopathy  Endocrine Examination: Normal thyroid examination      Assessment and Plan:    The patient presents with a history of gastroesophageal reflux and left ear tinnitus.  I have discussed with the patient dietary restrictions including avoiding eating spicy foods, greasy foods, and soda and well as the need to avoid caffeine and alcohol.  The patient and I have discussed avoiding eating within two hours of sleeping or lying down to rest.  The patient will also use zantac in the morning and zantac at night.  The patient will be seen again in six months to assess response to these efforts to control the symptoms. For his unilateral tinnitus, the patient will be referred for an MRI scan of the head and a consultation with Dr. Rick Nissen.

## 2017-05-16 ENCOUNTER — PRE VISIT (OUTPATIENT)
Dept: OTOLARYNGOLOGY | Facility: CLINIC | Age: 57
End: 2017-05-16

## 2017-05-16 NOTE — TELEPHONE ENCOUNTER
1.  Date/reason for appt: 5/30/17 -- unilateral tinnitus  2.  Referring provider: Kwame Bain  3.  Call to patient (Yes / No - short description): no, referred  4.  Previous care at / records requested from: [facility] -- records and imaging in epic/pacs

## 2017-05-30 ENCOUNTER — OFFICE VISIT (OUTPATIENT)
Dept: OTOLARYNGOLOGY | Facility: CLINIC | Age: 57
End: 2017-05-30

## 2017-05-30 VITALS — BODY MASS INDEX: 35.53 KG/M2 | WEIGHT: 181 LBS | HEIGHT: 60 IN

## 2017-05-30 DIAGNOSIS — H93.12 LEFT-SIDED TINNITUS: Primary | ICD-10-CM

## 2017-05-30 DIAGNOSIS — H61.23 EXCESSIVE CERUMEN IN BOTH EAR CANALS: ICD-10-CM

## 2017-05-30 ASSESSMENT — PAIN SCALES - GENERAL: PAINLEVEL: NO PAIN (0)

## 2017-05-30 NOTE — PATIENT INSTRUCTIONS
Please return to see Dr. Nissen in one year with Audiogram prior to your appointment.    Thank you.

## 2017-05-30 NOTE — MR AVS SNAPSHOT
After Visit Summary   5/30/2017    Damian Tejada    MRN: [id number]           Patient Information     Date Of Birth          1960        Visit Information        Provider Department      5/30/2017 8:00 AM Nissen, Rick L, MD [facility]        Today's Diagnoses     Left-sided tinnitus    -  1    Excessive cerumen in both ear canals          Care Instructions    Please return to see Dr. Nissen in one year with Audiogram prior to your appointment.    Thank you.          Follow-ups after your visit        Follow-up notes from your care team     Return in about 1 year (around 5/30/2018).      Your next 10 appointments already scheduled     Oct 20, 2017  4:30 PM CDT   (Arrive by 4:15 PM)   Return Visit with MD ARNAV Black [facility] ([facility])    [address] [place] [id number] [phone]              Who to contact     Please call your clinic at [phone] to:    Ask questions about your health    Make or cancel appointments    Discuss your medicines    Learn about your test results    Speak to your doctor   If you have compliments or concerns about an experience at your clinic, or if you wish to file a complaint, please contact [facility] Patient Relations at [phone] or email us at [email or web address]         Additional Information About Your Visit        MyChart Information     [employer] is an electronic gateway that provides easy, online access to your medical records. With [employer], you can request a clinic appointment, read your test results, renew a prescription or communicate with your care team.     To sign up for [employer] visit the website at [email or web address]   You will be asked to enter the access code listed below, as well as some personal information. Please follow the directions to create your username and password.     Your access  "code is: [email or web address]  Expires: 2017  9:52 AM     Your access code will  in 90 days. If you need help or a new code, please contact your [facility] or call [phone] for assistance.        Care EveryWhere ID     This is your Care EveryWhere ID. This could be used by other organizations to access your [facility] medical records  [id number]        Your Vitals Were     Height BMI (Body Mass Index)                0.68 m (2' 2.77\") 177.55 kg/m2           Blood Pressure from Last 3 Encounters:   17 (!) 152/94   16 125/86   16 118/74    Weight from Last 3 Encounters:   17 82.1 kg (181 lb)   17 79.8 kg (176 lb)   17 81.6 kg (180 lb)              We Performed the Following     BINOCULAR MICROSCOPY        Primary Care Provider Office Phone # Fax #    Italo Helms [phone]       [address] [address]        Thank you!     Thank you for choosing [facility]  for your care. Our goal is always to provide you with excellent care. Hearing back from our patients is one way we can continue to improve our services. Please take a few minutes to complete the written survey that you may receive in the mail after your visit with us. Thank you!             Your Updated Medication List - Protect others around you: Learn how to safely use, store and throw away your medicines at www.disposemymeds.org.          This list is accurate as of: 17  8:51 AM.  Always use your most recent med list.                   Brand Name Dispense Instructions for use    aspirin 81 MG EC tablet     90 tablet    Take 1 tablet (81 mg) by mouth daily       atorvastatin 80 MG tablet    LIPITOR    90 tablet    Take 1 tablet (80 mg) by mouth daily       carvedilol 3.125 MG tablet    COREG    90 tablet    Take 1 tablet (3.125 mg) by mouth 2 times daily (with meals)       clopidogrel 75 MG tablet    PLAVIX    98 " tablet    600 MG (8 pills) first day loading dose, then 75 MG once daily.       nitroglycerin 0.4 MG sublingual tablet    NITROSTAT    25 tablet    Place 1 tablet (0.4 mg) under the tongue every 5 minutes as needed for chest pain       ranitidine 150 MG tablet    ZANTAC    120 tablet    Take 1 tablet (150 mg) by mouth 2 times daily Take one tablet twice daily with second dose just prior to the night time sleep period       tamsulosin 0.4 MG capsule    FLOMAX    90 capsule    Take 1 capsule (0.4 mg) by mouth daily

## 2017-05-30 NOTE — PROGRESS NOTES
Dear Italo Delgado:    I had the pleasure of meeting Damian Tejada in consultation today at the [facility] at your request.    HISTORY OF PRESENT ILLNESS: The patient is a 57-year-old in today for assessment of left tinnitus.  He has had this for a couple months.  It is continuous.  He has not noticed any hearing change or fluctuation in either ear.  There has been no pain or drainage.  He feels his hearing has been stable.  He denies any dizziness.  There is no pain or drainage in the ears.  He further denies any dysphagia, hoarseness or facial paresthesias.  He did have an MRI scan April 20th which was reviewed today.  That shows no sign of enhancement within the internal auditory canal on either side; specifically, the left side.         ALLERGIES:  No Known Allergies    HABITS:   Alcohol use No    History   Smoking Status     Never Smoker   Smokeless Tobacco     Never Used         PAST MEDICAL HISTORY: Please see today's intake form (for the remainder of the PMH) which I reviewed and signed.  Past Medical History:   Diagnosis Date     BPH (benign prostatic hyperplasia)      Coronary artery disease 8/14/16    anterior STEMI s/p NATY X2     Hyperlipidemia LDL goal <70      Hypertension        FAMILY HISTORY/SOCIAL HISTORY:   Family History   Problem Relation Age of Onset     Skin Cancer No family hx of      Melanoma No family hx of     Social History     Social History     Marital status: Single     Spouse name: N/A     Number of children: N/A     Years of education: N/A     Occupational History     Not on file.     Social History Main Topics     Smoking status: Never Smoker     Smokeless tobacco: Never Used     Alcohol use No     Drug use: No     Sexual activity: No     Other Topics Concern     Not on file     Social History Narrative    Mr. Tejada lives alone. He has never been , has no children. Last sexually active 2009. He does not smoke cigarettes or drink  "alcohol, and denies illicit drug use. [place].         His mother  2/ \"old age\" around age 70. Father is alive and healthy at 75, has 5 siblings who are all healthy.        REVIEW OF SYSTEMS: Please see today's intake form (for the remainder of the ROS) which I have reviewed and signed.    PHYSICIAL EXAMINATION:  Constitutional: The patient was well-groomed and in no acute distress.   Skin: Warm and pink.  Psychiatric: The patient's affect was calm, cooperative, and appropriate.   Respiratory: Breathing comfortably without stridor or exertion of accessory muscles.  Eyes: Pupils were equal and reactive. Extraocular movement intact.   Head: Normocephalic and atraumatic. No lesions or scars.  Ears: Both ears are examined.  He does have bilateral cerumen obscuring both external auditory canals.  He was placed supine under the microscope and the right side initially was cleaned with curettes.  Following cleaning, tympanic membrane and middle ear look normal.  The left side was examined and cleaned using the microscope, curette and similar techniques.  Tympanic membrane and middle ear look normal after cleaning.   Nose: Sinuses were nontender. Anterior rhinoscopy revealed midline septum and absence of purulence or polyps.  Oral Cavity: Normal tongue, floor of moth, buccal mucosa, and palate. No lesions or masses on inspection or palpation. No abnormal lymph tissue in the oropharynx.   Neck: The parotid is soft without masses. Supple with normal laryngeal and tracheal landmarks.   Lymphatic: There is no palpable lymphadenopathy or other masses in the neck.   Neurologic: Alert and oriented x 3. Cranial nerves III-XI within normal limits. Voice quality normal.  Cerebellar Function Tests:  Grossly normal    Audiogram:  AUDIOGRAM:  Audiogram performed 2017 reviewed with him and shows normal hearing through all speech frequencies.  He has just a mild high-frequency " sensorineural hearing loss above 4000 Hz, slightly worse on the left.  He maintains excellent discrimination at 100% on the right and 96% on the left.         IMPRESSION AND PLAN: I talked with the patient for some time today and went over the mild sensorineural hearing loss he has as well as the MRI scan with him in detail.  I discussed the reason the MRI scan was obtained with the unilateral tinnitus complaint.  With everything looking normal on the scan I reassuring him everything looks fine.  The tinnitus has been there such a short time, I think it is a good chance that it will clear, but also could develop on the right side.  I would recommend we follow up in one year just to monitor on a long-term basis.  He is to come in sooner with any significant change or concerns.  Again, I reassured him I do not see anything worrisome.  I would just monitor for now.  He should protect his ears from noise.  He is not around any noise being a .         Thank you very much for the opportunity to participate in the care of your patient.    Rick L Nissen MD

## 2017-05-30 NOTE — LETTER
5/30/2017       RE: Damian Tejada  [address]  [address]  [place] [address]     Dear Colleague,    Thank you for referring your patient, Damian Tejada, to the [facility] at [facility]. Please see a copy of my visit note below.    Dear Italo Delgado:    I had the pleasure of meeting Damian Tejada in consultation today at the [facility] at your request.    HISTORY OF PRESENT ILLNESS: The patient is a 57-year-old in today for assessment of left tinnitus.  He has had this for a couple months.  It is continuous.  He has not noticed any hearing change or fluctuation in either ear.  There has been no pain or drainage.  He feels his hearing has been stable.  He denies any dizziness.  There is no pain or drainage in the ears.  He further denies any dysphagia, hoarseness or facial paresthesias.  He did have an MRI scan April 20th which was reviewed today.  That shows no sign of enhancement within the internal auditory canal on either side; specifically, the left side.         ALLERGIES:  No Known Allergies    HABITS:   Alcohol use No    History   Smoking Status     Never Smoker   Smokeless Tobacco     Never Used         PAST MEDICAL HISTORY: Please see today's intake form (for the remainder of the PMH) which I reviewed and signed.  Past Medical History:   Diagnosis Date     BPH (benign prostatic hyperplasia)      Coronary artery disease 8/14/16    anterior STEMI s/p NATY X2     Hyperlipidemia LDL goal <70      Hypertension        FAMILY HISTORY/SOCIAL HISTORY:   Family History   Problem Relation Age of Onset     Skin Cancer No family hx of      Melanoma No family hx of     Social History     Social History     Marital status: Single     Spouse name: N/A     Number of children: N/A     Years of education: N/A     Occupational History     Not on file.     Social History Main Topics     Smoking status: Never Smoker      "Smokeless tobacco: Never Used     Alcohol use No     Drug use: No     Sexual activity: No     Other Topics Concern     Not on file     Social History Narrative    Mr. Tejada lives alone. He has never been , has no children. Last sexually active . He does not smoke cigarettes or drink alcohol, and denies illicit drug use. He is from [place], moved to the  25 years ago.         His mother  2/ \"old age\" around age 70. Father is alive and healthy at 75, has 5 siblings who are all healthy.        REVIEW OF SYSTEMS: Please see today's intake form (for the remainder of the ROS) which I have reviewed and signed.    PHYSICIAL EXAMINATION:  Constitutional: The patient was well-groomed and in no acute distress.   Skin: Warm and pink.  Psychiatric: The patient's affect was calm, cooperative, and appropriate.   Respiratory: Breathing comfortably without stridor or exertion of accessory muscles.  Eyes: Pupils were equal and reactive. Extraocular movement intact.   Head: Normocephalic and atraumatic. No lesions or scars.  Ears: Both ears are examined.  He does have bilateral cerumen obscuring both external auditory canals.  He was placed supine under the microscope and the right side initially was cleaned with curettes.  Following cleaning, tympanic membrane and middle ear look normal.  The left side was examined and cleaned using the microscope, curette and similar techniques.  Tympanic membrane and middle ear look normal after cleaning.   Nose: Sinuses were nontender. Anterior rhinoscopy revealed midline septum and absence of purulence or polyps.  Oral Cavity: Normal tongue, floor of moth, buccal mucosa, and palate. No lesions or masses on inspection or palpation. No abnormal lymph tissue in the oropharynx.   Neck: The parotid is soft without masses. Supple with normal laryngeal and tracheal landmarks.   Lymphatic: There is no palpable lymphadenopathy or other masses in the neck.   Neurologic: Alert and oriented " x 3. Cranial nerves III-XI within normal limits. Voice quality normal.  Cerebellar Function Tests:  Grossly normal    Audiogram:  AUDIOGRAM:  Audiogram performed April 20, 2017 reviewed with him and shows normal hearing through all speech frequencies.  He has just a mild high-frequency sensorineural hearing loss above 4000 Hz, slightly worse on the left.  He maintains excellent discrimination at 100% on the right and 96% on the left.         IMPRESSION AND PLAN: I talked with the patient for some time today and went over the mild sensorineural hearing loss he has as well as the MRI scan with him in detail.  I discussed the reason the MRI scan was obtained with the unilateral tinnitus complaint.  With everything looking normal on the scan I reassuring him everything looks fine.  The tinnitus has been there such a short time, I think it is a good chance that it will clear, but also could develop on the right side.  I would recommend we follow up in one year just to monitor on a long-term basis.  He is to come in sooner with any significant change or concerns.  Again, I reassured him I do not see anything worrisome.  I would just monitor for now.  He should protect his ears from noise.  He is not around any noise being a .         Thank you very much for the opportunity to participate in the care of your patient.    Rick L Nissen MD

## 2017-10-20 ENCOUNTER — OFFICE VISIT (OUTPATIENT)
Dept: OTOLARYNGOLOGY | Facility: CLINIC | Age: 57
End: 2017-10-20

## 2017-10-20 DIAGNOSIS — H93.12 TINNITUS, LEFT: ICD-10-CM

## 2017-10-20 DIAGNOSIS — K21.9 GASTROESOPHAGEAL REFLUX DISEASE WITHOUT ESOPHAGITIS: Primary | ICD-10-CM

## 2017-10-20 ASSESSMENT — PAIN SCALES - GENERAL: PAINLEVEL: NO PAIN (0)

## 2017-10-20 NOTE — PROGRESS NOTES
The patient presents with a history of a globus sensation and mild dysphagia.  The patient denies odynophagia, hemoptysis, hematemasis, or unexplained weight loss.  The patient reports that the symptoms have been progressively worsening over the past few weeks.  The patient reports symptoms of heartburn intermittently.  The patient also reports left ear tinnitus that began suddenly a few weeks ago. He denies dizziness. The patient reports no change in the tinnitus. He would like to resume therapy for his gastroesophageal reflux as the symptoms redeveloped after he stopped taking the medication.     All other systems were reviewed and they are either negative or they are not directly pertinent to this Otolaryngology examination.      Past Medical History:    Past Medical History:   Diagnosis Date     BPH (benign prostatic hyperplasia)      Coronary artery disease 8/14/16    anterior STEMI s/p NATY X2     Hyperlipidemia LDL goal <70      Hypertension        Past Surgical History:    Past Surgical History:   Procedure Laterality Date     HEMORRHOIDECTOMY  Summer 2008     PROCTOPLASTY  Summer 2008     RECTAL SURGERY         Medications:      Current Outpatient Prescriptions:      carvedilol (COREG) 3.125 MG tablet, Take 1 tablet (3.125 mg) by mouth 2 times daily (with meals) (Patient not taking: Reported on 10/20/2017), Disp: 90 tablet, Rfl: 3     tamsulosin (FLOMAX) 0.4 MG 24 hr capsule, Take 1 capsule (0.4 mg) by mouth daily (Patient not taking: Reported on 10/20/2017), Disp: 90 capsule, Rfl: 3     clopidogrel (PLAVIX) 75 MG tablet, 600 MG (8 pills) first day loading dose, then 75 MG once daily. (Patient not taking: Reported on 10/20/2017), Disp: 98 tablet, Rfl: 3     aspirin EC 81 MG EC tablet, Take 1 tablet (81 mg) by mouth daily (Patient not taking: Reported on 10/20/2017), Disp: 90 tablet, Rfl: 3     nitroglycerin (NITROSTAT) 0.4 MG SL tablet, Place 1 tablet (0.4 mg) under the tongue every 5 minutes as needed for  chest pain (Patient not taking: Reported on 10/20/2017), Disp: 25 tablet, Rfl: 3     atorvastatin (LIPITOR) 80 MG tablet, Take 1 tablet (80 mg) by mouth daily (Patient not taking: Reported on 10/20/2017), Disp: 90 tablet, Rfl: 3    Allergies:    Review of patient's allergies indicates no known allergies.    Physical Examination:    The patient is a well developed, well nourished male in no apparent distress.  He is normocephalic, atraumatic with pupils equally round and reactive to light.    Oral Cavity Examination:  Normal mucosa with no masses or lesions  Nasal Examination: Normal mucosa with no masses or lesions  Neurological Examination: Facial nerve function intact and symmetric  Integumentary Examination: No lesions on the skin of the head and neck      Assessment and Plan:    The patient presents with a history of gastroesophageal reflux and left ear tinnitus.  I have discussed with the patient dietary restrictions including avoiding eating spicy foods, greasy foods, and soda and well as the need to avoid caffeine and alcohol.  The patient and I have discussed avoiding eating within two hours of sleeping or lying down to rest.  The patient will also use zantac in the morning and zantac at night.  The patient will be seen again in one month to assess response to these efforts to control the symptoms. We have discussed the tinnitus support program, but he declines this option at this time.

## 2017-10-20 NOTE — NURSING NOTE
Chief Complaint   Patient presents with     RECHECK     Follow up for hissing in left ear, symptoms still persist      Edd Bronson

## 2017-10-20 NOTE — MR AVS SNAPSHOT
After Visit Summary   10/20/2017    Damian Tejada    MRN: [id number]           Patient Information     Date Of Birth          1960        Visit Information        Provider Department      10/20/2017 4:30 PM Kwame Bain MD [facility]        Today's Diagnoses     Gastroesophageal reflux disease without esophagitis    -  1    Tinnitus, left          Care Instructions      The patient presents with a history of gastroesophageal reflux and left ear tinnitus.  I have discussed with the patient dietary restrictions including avoiding eating spicy foods, greasy foods, and soda and well as the need to avoid caffeine and alcohol.  The patient and I have discussed avoiding eating within two hours of sleeping or lying down to rest.  The patient will also use zantac in the morning and zantac at night.  The patient will be seen again in one month to assess response to these efforts to control the symptoms. We have discussed the tinnitus support program, but he declines this option at this time.           Follow-ups after your visit        Your next 10 appointments already scheduled     Oct 20, 2017  4:30 PM CDT   (Arrive by 4:15 PM)   Return Visit with Kwame Bain MD   [facility] ([facility] [place])    [address] [place] [id number] [phone]              Who to contact     Please call your clinic at [phone] to:    Ask questions about your health    Make or cancel appointments    Discuss your medicines    Learn about your test results    Speak to your doctor   If you have compliments or concerns about an experience at your clinic, or if you wish to file a complaint, please contact [facility] Patient Relations at [phone] or email us at [email or web address]         Additional Information About Your Visit        MyChart Information     Marcus is an  electronic gateway that provides easy, online access to your medical records. With Texxi, you can request a clinic appointment, read your test results, renew a prescription or communicate with your care team.     To sign up for Texxi visit the website at [email or web address]   You will be asked to enter the access code listed below, as well as some personal information. Please follow the directions to create your username and password.     Your access code is: [email or web address]  Expires: 2018  6:31 AM     Your access code will  in 90 days. If you need help or a new code, please contact your [facility] or call [phone] for assistance.        Care EveryWhere ID     This is your Care EveryWhere ID. This could be used by other organizations to access your [facility] medical records  [id number]         Blood Pressure from Last 3 Encounters:   17 (!) 152/94   16 125/86   16 118/74    Weight from Last 3 Encounters:   17 82.1 kg (181 lb)   17 79.8 kg (176 lb)   17 81.6 kg (180 lb)              Today, you had the following     No orders found for display         Today's Medication Changes          These changes are accurate as of: 10/20/17  4:27 PM.  If you have any questions, ask your nurse or doctor.               Start taking these medicines.        Dose/Directions    ranitidine 150 MG tablet   Commonly known as:  ZANTAC   Used for:  Gastroesophageal reflux disease without esophagitis   Started by:  Kwame Bain MD        Dose:  150 mg   Take 1 tablet (150 mg) by mouth 2 times daily Take one tablet twice daily with second dose just prior to the night time sleep period   Quantity:  120 tablet   Refills:  3            Where to get your medicines      These medications were sent to [id number] - [place], [place] - [address] [place] [address]     Phone:  [phone]     ranitidine 150 MG tablet                 Primary Care Provider Office Phone # Fax #    Italo Helms [phone]       [address] [address]        Equal Access to Services     MARIA A MONTOYA : Hadii dariel baer neetuo Soeffieali, waaxda luqadaha, qaybta kaalmada adetashi, pavel huizar melodieenrrique setwart jorge yeager. So [facility] [phone].    ATENCIÓN: Si habla español, tiene a pérez disposición servicios gratuitos de asistencia lingüística. Mayelaame al [phone].    We comply with applicable federal civil rights laws and [place] laws. We do not discriminate on the basis of race, color, national origin, age, disability, sex, sexual orientation, or gender identity.            Thank you!     Thank you for choosing [facility] EAR NOSE AND THROAT  for your care. Our goal is always to provide you with excellent care. Hearing back from our patients is one way we can continue to improve our services. Please take a few minutes to complete the written survey that you may receive in the mail after your visit with us. Thank you!             Your Updated Medication List - Protect others around you: Learn how to safely use, store and throw away your medicines at www.disposemymeds.org.          This list is accurate as of: 10/20/17  4:27 PM.  Always use your most recent med list.                   Brand Name Dispense Instructions for use Diagnosis    aspirin 81 MG EC tablet     90 tablet    Take 1 tablet (81 mg) by mouth daily    ST elevation (STEMI) myocardial infarction involving left anterior descending coronary artery (H)       atorvastatin 80 MG tablet    LIPITOR    90 tablet    Take 1 tablet (80 mg) by mouth daily    ACS (acute coronary syndrome) (H)       carvedilol 3.125 MG tablet    COREG    90 tablet    Take 1 tablet (3.125 mg) by mouth 2 times daily (with meals)    ACS (acute coronary syndrome) (H)       clopidogrel 75 MG tablet    PLAVIX    98 tablet    600 MG (8 pills) first day loading dose, then 75 MG  once daily.    ST elevation myocardial infarction involving left main coronary artery (H)       nitroGLYcerin 0.4 MG sublingual tablet    NITROSTAT    25 tablet    Place 1 tablet (0.4 mg) under the tongue every 5 minutes as needed for chest pain    ACS (acute coronary syndrome) (H)       ranitidine 150 MG tablet    ZANTAC    120 tablet    Take 1 tablet (150 mg) by mouth 2 times daily Take one tablet twice daily with second dose just prior to the night time sleep period    Gastroesophageal reflux disease without esophagitis       tamsulosin 0.4 MG capsule    FLOMAX    90 capsule    Take 1 capsule (0.4 mg) by mouth daily    Benign non-nodular prostatic hyperplasia with lower urinary tract symptoms

## 2017-10-20 NOTE — PATIENT INSTRUCTIONS
The patient presents with a history of gastroesophageal reflux and left ear tinnitus.  I have discussed with the patient dietary restrictions including avoiding eating spicy foods, greasy foods, and soda and well as the need to avoid caffeine and alcohol.  The patient and I have discussed avoiding eating within two hours of sleeping or lying down to rest.  The patient will also use zantac in the morning and zantac at night.  The patient will be seen again in one month to assess response to these efforts to control the symptoms. We have discussed the tinnitus support program, but he declines this option at this time.

## 2017-10-20 NOTE — LETTER
10/20/2017       RE: Damian Tejada  [address]  [facility] [place] [facility] [address]     Dear Colleague,    Thank you for referring your patient, Damian Tejada, to the [facility] E[facility] at [facility]. Please see a copy of my visit note below.    The patient presents with a history of a globus sensation and mild dysphagia.  The patient denies odynophagia, hemoptysis, hematemasis, or unexplained weight loss.  The patient reports that the symptoms have been progressively worsening over the past few weeks.  The patient reports symptoms of heartburn intermittently.  The patient also reports left ear tinnitus that began suddenly a few weeks ago. He denies dizziness. The patient reports no change in the tinnitus. He would like to resume therapy for his gastroesophageal reflux as the symptoms redeveloped after he stopped taking the medication.     All other systems were reviewed and they are either negative or they are not directly pertinent to this Otolaryngology examination.      Past Medical History:    Past Medical History:   Diagnosis Date     BPH (benign prostatic hyperplasia)      Coronary artery disease 8/14/16    anterior STEMI s/p NATY X2     Hyperlipidemia LDL goal <70      Hypertension        Past Surgical History:    Past Surgical History:   Procedure Laterality Date     HEMORRHOIDECTOMY  Summer 2008     PROCTOPLASTY  Summer 2008     RECTAL SURGERY         Medications:      Current Outpatient Prescriptions:      carvedilol (COREG) 3.125 MG tablet, Take 1 tablet (3.125 mg) by mouth 2 times daily (with meals) (Patient not taking: Reported on 10/20/2017), Disp: 90 tablet, Rfl: 3     tamsulosin (FLOMAX) 0.4 MG 24 hr capsule, Take 1 capsule (0.4 mg) by mouth daily (Patient not taking: Reported on 10/20/2017), Disp: 90 capsule, Rfl: 3     clopidogrel (PLAVIX) 75 MG tablet, 600 MG (8 pills) first day loading dose, then 75 MG once daily. (Patient not taking: Reported on  10/20/2017), Disp: 98 tablet, Rfl: 3     aspirin EC 81 MG EC tablet, Take 1 tablet (81 mg) by mouth daily (Patient not taking: Reported on 10/20/2017), Disp: 90 tablet, Rfl: 3     nitroglycerin (NITROSTAT) 0.4 MG SL tablet, Place 1 tablet (0.4 mg) under the tongue every 5 minutes as needed for chest pain (Patient not taking: Reported on 10/20/2017), Disp: 25 tablet, Rfl: 3     atorvastatin (LIPITOR) 80 MG tablet, Take 1 tablet (80 mg) by mouth daily (Patient not taking: Reported on 10/20/2017), Disp: 90 tablet, Rfl: 3    Allergies:    Review of patient's allergies indicates no known allergies.    Physical Examination:    The patient is a well developed, well nourished male in no apparent distress.  He is normocephalic, atraumatic with pupils equally round and reactive to light.    Oral Cavity Examination:  Normal mucosa with no masses or lesions  Nasal Examination: Normal mucosa with no masses or lesions  Neurological Examination: Facial nerve function intact and symmetric  Integumentary Examination: No lesions on the skin of the head and neck      Assessment and Plan:    The patient presents with a history of gastroesophageal reflux and left ear tinnitus.  I have discussed with the patient dietary restrictions including avoiding eating spicy foods, greasy foods, and soda and well as the need to avoid caffeine and alcohol.  The patient and I have discussed avoiding eating within two hours of sleeping or lying down to rest.  The patient will also use zantac in the morning and zantac at night.  The patient will be seen again in one month to assess response to these efforts to control the symptoms. We have discussed the tinnitus support program, but he declines this option at this time.     Again, thank you for allowing me to participate in the care of your patient.      Sincerely,    Kwame Bain MD

## 2018-08-22 NOTE — TELEPHONE ENCOUNTER
FUTURE VISIT INFORMATION      FUTURE VISIT INFORMATION:    Date: 8/23    Time: 2;00    Location:   REFERRAL INFORMATION:    Referring provider:  SELF    Referring providers clinic:      Reason for visit/diagnosis: Lt shoulder injury, appt per pt. DOI 06/01/18      RECORDS STATUS      NO OUTSIDE RECORDS

## 2018-08-23 ENCOUNTER — OFFICE VISIT (OUTPATIENT)
Dept: OTOLARYNGOLOGY | Facility: CLINIC | Age: 58
End: 2018-08-23
Payer: COMMERCIAL

## 2018-08-23 ENCOUNTER — PRE VISIT (OUTPATIENT)
Dept: ORTHOPEDICS | Facility: CLINIC | Age: 58
End: 2018-08-23

## 2018-08-23 ENCOUNTER — OFFICE VISIT (OUTPATIENT)
Dept: ORTHOPEDICS | Facility: CLINIC | Age: 58
End: 2018-08-23
Payer: COMMERCIAL

## 2018-08-23 VITALS — HEART RATE: 85 BPM | SYSTOLIC BLOOD PRESSURE: 171 MMHG | RESPIRATION RATE: 16 BRPM | DIASTOLIC BLOOD PRESSURE: 116 MMHG

## 2018-08-23 VITALS — BODY MASS INDEX: 181.47 KG/M2 | WEIGHT: 185 LBS

## 2018-08-23 DIAGNOSIS — M67.912 TENDINOPATHY OF LEFT ROTATOR CUFF: Primary | ICD-10-CM

## 2018-08-23 DIAGNOSIS — H93.13 TINNITUS, BILATERAL: ICD-10-CM

## 2018-08-23 DIAGNOSIS — K21.9 GASTROESOPHAGEAL REFLUX DISEASE WITHOUT ESOPHAGITIS: Primary | ICD-10-CM

## 2018-08-23 ASSESSMENT — PAIN SCALES - GENERAL: PAINLEVEL: NO PAIN (0)

## 2018-08-23 NOTE — MR AVS SNAPSHOT
After Visit Summary   8/23/2018    Damian Teajda    MRN: [id number]           Patient Information     Date Of Birth          1960        Visit Information        Provider Department      8/23/2018 2:00 PM Nikolas Mora MD [facility]        Today's Diagnoses     Tendinopathy of left rotator cuff    -  1      Care Instructions    You have a rotator cuff tendinopathy    Please take naproxen (Aleve) 400mg once in the morning and once in the evening    Please work with the physical therapist to improve your strength    If not getting better in 4-6 weeks, please return and we can discuss imaging/injections            Follow-ups after your visit        Additional Services     [facility] PT, HAND, AND CHIROPRACTIC REFERRAL       **This order will print in the [facility] Scheduling Office**    Physical Therapy, Hand Therapy and Chiropractic Care are available through:    *[facility]  *[facility]  *[facility]    Call one number to schedule at any of the above locations: [phone].    Your provider has referred you to: Physical Therapy at [facility] or [facility]    Indication/Reason for Referral: Shoulder Pain  Onset of Illness: 3 months  Therapy Orders: Evaluate and Treat  Special Programs: None  Special Request: None    Ekaterina Tobar      Additional Comments for the Therapist or Chiropractor: Rotator cuff    Please be aware that coverage of these services is subject to the terms and limitations of your health insurance plan.  Call member services at your health plan with any benefit or coverage questions.      Please bring the following to your appointment:    *Your personal calendar for scheduling future appointments  *Comfortable clothing                  Who to contact     Please call your clinic at [phone] to:    Ask questions about your health    Make or cancel appointments    Discuss your medicines    Learn about your test results    Speak  to your doctor            Additional Information About Your Visit        [employer] Information     [employer] is an electronic gateway that provides easy, online access to your medical records. With [employer], you can request a clinic appointment, read your test results, renew a prescription or communicate with your care team.     To sign up for [employer] visit the website at [email or web address]   You will be asked to enter the access code listed below, as well as some personal information. Please follow the directions to create your username and password.     Your access code is: [email or web address]  Expires: 2018  6:30 AM     Your access code will  in 90 days. If you need help or a new code, please contact your [facility] or call [phone] for assistance.        Care EveryWhere ID     This is your Care EveryWhere ID. This could be used by other organizations to access your [facility] medical records  [id number]        Your Vitals Were     Pulse Respirations                85 16           Blood Pressure from Last 3 Encounters:   18 (!) 171/116   17 (!) 152/94   16 125/86    Weight from Last 3 Encounters:   18 185 lb (83.9 kg)   17 181 lb (82.1 kg)   17 176 lb (79.8 kg)              We Performed the Following     [facility] PT, HAND, AND CHIROPRACTIC REFERRAL        Primary Care Provider    Italo Helms MD       No address on file        Equal Access to Services     [facility]: Hadii aad ku hadasho Soeffieali, waaxda luqadaha, qaybta kaalmada adeegyada, waxay hali patel . So [facility] [phone].    ATENCIÓN: Si habla español, tiene a pérez disposición servicios gratuitos de asistencia lingüística. Llame al [phone].    We comply with applicable federal civil rights laws and [place] laws. We do not discriminate on the basis of race, color, national origin, age, disability, sex, sexual orientation, or gender  identity.            Thank you!     Thank you for choosing [facility]  for your care. Our goal is always to provide you with excellent care. Hearing back from our patients is one way we can continue to improve our services. Please take a few minutes to complete the written survey that you may receive in the mail after your visit with us. Thank you!             Your Updated Medication List - Protect others around you: Learn how to safely use, store and throw away your medicines at www.disposemymeds.org.          This list is accurate as of 8/23/18  3:22 PM.  Always use your most recent med list.                   Brand Name Dispense Instructions for use Diagnosis    aspirin 81 MG EC tablet     90 tablet    Take 1 tablet (81 mg) by mouth daily    ST elevation (STEMI) myocardial infarction involving left anterior descending coronary artery (H)       atorvastatin 80 MG tablet    LIPITOR    90 tablet    Take 1 tablet (80 mg) by mouth daily    ACS (acute coronary syndrome) (H)       carvedilol 3.125 MG tablet    COREG    90 tablet    Take 1 tablet (3.125 mg) by mouth 2 times daily (with meals)    ACS (acute coronary syndrome) (H)       clopidogrel 75 MG tablet    PLAVIX    98 tablet    600 MG (8 pills) first day loading dose, then 75 MG once daily.    ST elevation myocardial infarction involving left main coronary artery (H)       nitroGLYcerin 0.4 MG sublingual tablet    NITROSTAT    25 tablet    Place 1 tablet (0.4 mg) under the tongue every 5 minutes as needed for chest pain    ACS (acute coronary syndrome) (H)       tamsulosin 0.4 MG capsule    FLOMAX    90 capsule    Take 1 capsule (0.4 mg) by mouth daily    Benign non-nodular prostatic hyperplasia with lower urinary tract symptoms

## 2018-08-23 NOTE — PROGRESS NOTES
The patient presents with a history of a globus sensation and mild dysphagia.  The patient denies odynophagia, hemoptysis, hematemasis, or unexplained weight loss.  The patient reports no change in his gastroesophageal reflux symptoms. The patient reports symptoms of heartburn intermittently.  The patient has visited with Dr. Rick Nissen regarding his tinnitus and hearing loss. The patient reports no change in the tinnitus. He denies sinusitis or rhinitis.     All other systems were reviewed and they are either negative or they are not directly pertinent to this Otolaryngology examination.      Past Medical History:    Past Medical History:   Diagnosis Date     BPH (benign prostatic hyperplasia)      Coronary artery disease 8/14/16    anterior STEMI s/p NATY X2     Hyperlipidemia LDL goal <70      Hypertension        Past Surgical History:    Past Surgical History:   Procedure Laterality Date     HEMORRHOIDECTOMY  Summer 2008     PROCTOPLASTY  Summer 2008     RECTAL SURGERY         Medications:      Current Outpatient Prescriptions:      aspirin EC 81 MG EC tablet, Take 1 tablet (81 mg) by mouth daily (Patient not taking: Reported on 10/20/2017), Disp: 90 tablet, Rfl: 3     atorvastatin (LIPITOR) 80 MG tablet, Take 1 tablet (80 mg) by mouth daily (Patient not taking: Reported on 10/20/2017), Disp: 90 tablet, Rfl: 3     carvedilol (COREG) 3.125 MG tablet, Take 1 tablet (3.125 mg) by mouth 2 times daily (with meals) (Patient not taking: Reported on 10/20/2017), Disp: 90 tablet, Rfl: 3     clopidogrel (PLAVIX) 75 MG tablet, 600 MG (8 pills) first day loading dose, then 75 MG once daily. (Patient not taking: Reported on 10/20/2017), Disp: 98 tablet, Rfl: 3     nitroglycerin (NITROSTAT) 0.4 MG SL tablet, Place 1 tablet (0.4 mg) under the tongue every 5 minutes as needed for chest pain (Patient not taking: Reported on 10/20/2017), Disp: 25 tablet, Rfl: 3     tamsulosin (FLOMAX) 0.4 MG 24 hr capsule, Take 1 capsule (0.4 mg)  by mouth daily (Patient not taking: Reported on 10/20/2017), Disp: 90 capsule, Rfl: 3    Allergies:    Review of patient's allergies indicates no known allergies.    Physical Examination:    The patient is a well developed, well nourished male in no apparent distress.  He is normocephalic, atraumatic with pupils equally round and reactive to light.    Oral Cavity Examination:  Normal mucosa with no masses or lesions  Nasal Examination: Normal mucosa with no masses or lesions, septal deviation to the left  Neurological Examination: Facial nerve function intact and symmetric  Integumentary Examination: No lesions on the skin of the head and neck  Flexible Fiberoptic Laryngoscopy:  Normal nasopharynx, base of tongue, pyriform sinuses, epiglottis, valleculae, false vocal cords, true vocal cords, and larynx.  Normal motion of the vocal cords with no lesions, masses, nodules, or polyps bilaterally.       Assessment and Plan:    The patient presents with a history of gastroesophageal reflux and left ear tinnitus. Based upon his lack of progress with his gastroesophageal reflux using medicalt therapy, the patient will be referred for further evaluation and management to Gastroenterology.     Date:     Surgeon: Dr. Kwame Bain     PreOp Diagnosis:  Gastroesophageal Reflux Disease without Esophagitis    PostOp Diagnosis: Gastroesophageal Reflux Disease without Esophagitis    Procedure: Flexible Fiberoptic Laryngoscopy    Estimated Blood Loss: None    Complications: None    Consent: The patient was informed of the possible complications and probable outcomes of the procedure and the patient gave informed consent.    Fluids: None    Drains: None    Disposition: to home    Findings: Normal nasopharynx, base of tongue, pyriform sinuses, epiglottis, valleculae, false vocal cords, true vocal cords, and larynx.  Normal motion of the vocal cords with no lesions, masses, nodules, or polyps bilaterally.     Description of  Procedure:    The patient was positioned on the clinic room chair. The nose was decongested and anesthetized with 2 puffs in each nostrils lidocaine hcl 4% and oxymetazoline hcl 0.05% topical solution. The flexible fiberoptic scope was passed into the each nostrils and the nasal passages visualized. The scope was passed through the left nostril into the nasopharynx which was normal. The based of tongue and tonsil tissues were visualized and found to be normal. The vocal cords and larynx were visualized and the true vocal cords were visualized and found to be normal.         Answers for HPI/ROS submitted by the patient on 8/23/2018   PHQ-2 Score: 0

## 2018-08-23 NOTE — PATIENT INSTRUCTIONS
You have a rotator cuff tendinopathy    Please take naproxen (Aleve) 400mg once in the morning and once in the evening for 2 weeks    Please work with the physical therapist to improve your strength    If not getting better in 4-6 weeks, please return and we can discuss imaging/injections

## 2018-08-23 NOTE — NURSING NOTE
Chief Complaint   Patient presents with     RECHECK     Follow up, acid reflux     Vivek Gupta, EMT

## 2018-08-23 NOTE — PROGRESS NOTES
Subjective:   Damian Tejada is a 58 year old male who presents with left shoulder pain. He slept on left shoulder in beginning of June and had pain when he awoke. He is left handed. He is retired .     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 Multiple repetitive motions including lifting from ground and overhead. Awoke in June with aching in his left shoulder- no specific activity or change in activity that preceded this pain. Since that time, has had difficulty with carrying objects and lifting overhead. Denies trauma or previous shoulder issues. Has used his shoulder less because of the discomfort. Has full range of motion but with painful external rotation, overhead reach and reaching behind his back. Denies numbness, tingling, or weakness. No fever, chills or recent abx use.      He is curious about need for surgery/MRI need.     PAST MEDICAL, SOCIAL, SURGICAL AND FAMILY HISTORY: He  has a past medical history of BPH (benign prostatic hyperplasia); Coronary artery disease (8/14/16); Hyperlipidemia LDL goal <70; and Hypertension.  He  has a past surgical history that includes hemorrhoidectomy (Summer 2008); Proctoplasty (Summer 2008); and Rectal surgery.  His family history is negative for Skin Cancer and Melanoma.  He reports that he has never smoked. He has never used smokeless tobacco. He reports that he does not drink alcohol or use illicit drugs.    ALLERGIES: He has No Known Allergies.    CURRENT MEDICATIONS: He has a current medication list which includes the following prescription(s): aspirin, atorvastatin, carvedilol, clopidogrel, nitroglycerin, and tamsulosin.     REVIEW OF SYSTEMS: 10 point review of systems is negative except as noted  above.     Exam:   BP (!) 171/116  Pulse 85  Resp 16     General  - normal appearance, in no obvious distress  CV  - normal radial pulse  Pulm  - normal respiratory pattern, non-labored  Musculoskeletal - shoulder   - inspection: normal bone and joint alignment, no obvious deformity, left scapula superiorly & externally rotated compared to right with lifting & pushing off, no scapular winging, no AC step-off  - palpation: No tenderness over RC insertion, normal clavicle, non-tender AC  - ROM:  painful and limited flexion and ER at end range, limited IR  - strength: 5/5  strength, 5/5 in all shoulder planes  - special tests:  (-) Speed's  (-) Neer  (+) Hawkin's  (+) Emigdio's  (+) [place]'s  (+) apprehension  (+) subscap lift-off    Neuro  - no sensory or motor deficit, grossly normal coordination, normal muscle tone  Skin  - no ecchymosis, erythema, warmth, or induration, no obvious rash  Psych  - interactive, appropriate, normal mood and affect    US (POC) evaluated his left shoulder and rotator cuff musculature: AC joint reassuring, no high grade tears noted, mild fluid around supraspinatus         Assessment/Plan:   Damian Tejada is a 58 year old left handed former 27 year  who presents with left shoulder pain and weakness. Physical exam consistent with left rotator cuff tendinopathy. POC US used to demonstrate supraspinatus, infraspinatus, and subscapularis edema and tendinopathy. Discussed with patient initial conservative management with NSAIDs and physical therapy. If not improving in 4-6 weeks, will return and discuss MRI/subacromail injection to help with discomfort. Patient agreeable to plan.    1. Tendinopathy of left rotator cuff  - Naproxen 400mg BID for 10-14 days  - Icing area as able  - [facility] PT, HAND, AND CHIROPRACTIC REFERRAL    2. Elevated bp   Discussed with patient the importance of following up with PCP to discuss HTN. Patient with elevated BP over the last 1.5 years per chart review.  Asymptomatic.     Patient Instructions   You have a rotator cuff tendinopathy    Please take naproxen (Aleve) 400mg once in the morning and once in the evening for 2 weeks    Please work with the physical therapist to improve your strength    If not getting better in 4-6 weeks, please return and we can discuss imaging/injections         Dr. Brent Muro was present for entire visit    Patient seen & discussed with Dr. Merced Mora MD  Sports Medicine Fellow

## 2018-08-23 NOTE — PATIENT INSTRUCTIONS
The patient presents with a history of gastroesophageal reflux and left ear tinnitus. Based upon his lack of progress with his gastroesophageal reflux using medicalt therapy, the patient will be referred for further evaluation and management to Gastroenterology.

## 2018-08-23 NOTE — MR AVS SNAPSHOT
After Visit Summary   8/23/2018    Damian Tejada    MRN: [id number]           Patient Information     Date Of Birth          1960        Visit Information        Provider Department      8/23/2018 10:15 AM Kwame Bain MD [facility] Ear Nose and Throat        Today's Diagnoses     Gastroesophageal reflux disease without esophagitis    -  1    Tinnitus, bilateral          Care Instructions    The patient presents with a history of gastroesophageal reflux and left ear tinnitus. Based upon his lack of progress with his gastroesophageal reflux using medicalt therapy, the patient will be referred for further evaluation and management to Gastroenterology.           Follow-ups after your visit        Additional Services     GASTROENTEROLOGY ADULT REF CONSULT ONLY       Consult to [place] [facility]                  Your next 10 appointments already scheduled     Aug 23, 2018  2:00 PM CDT   (Arrive by 1:45 PM)   New Patient Visit with Nikolas Mora MD   [facility] ([facility])    [address] [place] [id number] [phone]              Who to contact     Please call your clinic at [phone] to:    Ask questions about your health    Make or cancel appointments    Discuss your medicines    Learn about your test results    Speak to your doctor            Additional Information About Your Visit        MyChart Information     [employer] is an electronic gateway that provides easy, online access to your medical records. With [employer], you can request a clinic appointment, read your test results, renew a prescription or communicate with your care team.     To sign up for [employer] visit the website at [email or web address]   You will be asked to enter the access code listed below, as well as some personal information. Please follow the directions to create your username and password.     Your access code is:  [email or web address]  Expires: 2018  6:30 AM     Your access code will  in 90 days. If you need help or a new code, please contact your [facility] or call [phone] for assistance.        Care EveryWhere ID     This is your Care EveryWhere ID. This could be used by other organizations to access your [place] medical records  [id number]        Your Vitals Were     BMI (Body Mass Index)                   181.47 kg/m2            Blood Pressure from Last 3 Encounters:   17 (!) 152/94   16 125/86   16 118/74    Weight from Last 3 Encounters:   18 83.9 kg (185 lb)   17 82.1 kg (181 lb)   17 79.8 kg (176 lb)              We Performed the Following     GASTROENTEROLOGY ADULT REF CONSULT ONLY        Primary Care Provider    Italo Helms MD       No address on file        Equal Access to Services     [facility]: Hadii aad ku hadasho Sopam, waaxda luqadaha, qaybta kaalmada adeegyada, waxay kingain hayanshul patel . So [facility] [phone].    ATENCIÓN: Si habla español, tiene a pérez disposición servicios gratuitos de asistencia lingüística. Llame al [phone].    We comply with applicable federal civil rights laws and [place] laws. We do not discriminate on the basis of race, color, national origin, age, disability, sex, sexual orientation, or gender identity.            Thank you!     Thank you for choosing [facility] EAR NOSE AND THROAT  for your care. Our goal is always to provide you with excellent care. Hearing back from our patients is one way we can continue to improve our services. Please take a few minutes to complete the written survey that you may receive in the mail after your visit with us. Thank you!             Your Updated Medication List - Protect others around you: Learn how to safely use, store and throw away your medicines at www.disposemymeds.org.          This list is accurate as of 18 10:24 AM.  Always  use your most recent med list.                   Brand Name Dispense Instructions for use Diagnosis    aspirin 81 MG EC tablet     90 tablet    Take 1 tablet (81 mg) by mouth daily    ST elevation (STEMI) myocardial infarction involving left anterior descending coronary artery (H)       atorvastatin 80 MG tablet    LIPITOR    90 tablet    Take 1 tablet (80 mg) by mouth daily    ACS (acute coronary syndrome) (H)       carvedilol 3.125 MG tablet    COREG    90 tablet    Take 1 tablet (3.125 mg) by mouth 2 times daily (with meals)    ACS (acute coronary syndrome) (H)       clopidogrel 75 MG tablet    PLAVIX    98 tablet    600 MG (8 pills) first day loading dose, then 75 MG once daily.    ST elevation myocardial infarction involving left main coronary artery (H)       nitroGLYcerin 0.4 MG sublingual tablet    NITROSTAT    25 tablet    Place 1 tablet (0.4 mg) under the tongue every 5 minutes as needed for chest pain    ACS (acute coronary syndrome) (H)       tamsulosin 0.4 MG capsule    FLOMAX    90 capsule    Take 1 capsule (0.4 mg) by mouth daily    Benign non-nodular prostatic hyperplasia with lower urinary tract symptoms

## 2018-08-23 NOTE — LETTER
8/23/2018      RE: Damian Tejada  [address]  [place] [address]        Subjective:   Damian Tejada is a 58 year old male who presents with left shoulder pain. He slept on left shoulder in beginning of June and had pain when he awoke. He is left handed. He is retired .     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 Multiple repetitive motions including lifting from ground and overhead. Awoke in June with aching in his left shoulder- no specific activity or change in activity that preceded this pain. Since that time, has had difficulty with carrying objects and lifting overhead. Denies trauma or previous shoulder issues. Has used his shoulder less because of the discomfort. Has full range of motion but with painful external rotation, overhead reach and reaching behind his back. Denies numbness, tingling, or weakness. No fever, chills or recent abx use.      He is curious about need for surgery/MRI need.     PAST MEDICAL, SOCIAL, SURGICAL AND FAMILY HISTORY: He  has a past medical history of BPH (benign prostatic hyperplasia); Coronary artery disease (8/14/16); Hyperlipidemia LDL goal <70; and Hypertension.  He  has a past surgical history that includes hemorrhoidectomy (Summer 2008); Proctoplasty (Summer 2008); and Rectal surgery.  His family history is negative for Skin Cancer and Melanoma.  He reports that he has never smoked. He has never used smokeless tobacco. He reports that he does not drink alcohol or use illicit drugs.    ALLERGIES: He has No Known Allergies.    CURRENT MEDICATIONS: He has a current medication list which includes the following prescription(s): aspirin, atorvastatin, carvedilol, clopidogrel, nitroglycerin, and  tamsulosin.     REVIEW OF SYSTEMS: 10 point review of systems is negative except as noted above.     Exam:   BP (!) 171/116  Pulse 85  Resp 16     General  - normal appearance, in no obvious distress  CV  - normal radial pulse  Pulm  - normal respiratory pattern, non-labored  Musculoskeletal - shoulder   - inspection: normal bone and joint alignment, no obvious deformity, left scapula superiorly & externally rotated compared to right with lifting & pushing off, no scapular winging, no AC step-off  - palpation: No tenderness over RC insertion, normal clavicle, non-tender AC  - ROM:  painful and limited flexion and ER at end range, limited IR  - strength: 5/5  strength, 5/5 in all shoulder planes  - special tests:  (-) Speed's  (-) Neer  (+) Hawkin's  (+) Emigdio's  (+) [place]'s  (+) apprehension  (+) subscap lift-off    Neuro  - no sensory or motor deficit, grossly normal coordination, normal muscle tone  Skin  - no ecchymosis, erythema, warmth, or induration, no obvious rash  Psych  - interactive, appropriate, normal mood and affect    US (POC) evaluated his left shoulder and rotator cuff musculature: AC joint reassuring, no high grade tears noted, mild fluid around supraspinatus         Assessment/Plan:   Damian Tejada is a 58 year old left handed former 27 year  who presents with left shoulder pain and weakness. Physical exam consistent with left rotator cuff tendinopathy. POC US used to demonstrate supraspinatus, infraspinatus, and subscapularis edema and tendinopathy. Discussed with patient initial conservative management with NSAIDs and physical therapy. If not improving in 4-6 weeks, will return and discuss MRI/subacromail injection to help with discomfort. Patient agreeable to plan.    1. Tendinopathy of left rotator cuff  - Naproxen 400mg BID for 10-14 days  - Icing area as able  - [facility] PT, HAND, AND CHIROPRACTIC REFERRAL    2. Elevated bp   Discussed with patient the importance of following up  with PCP to discuss HTN. Patient with elevated BP over the last 1.5 years per chart review. Asymptomatic.     Patient Instructions   You have a rotator cuff tendinopathy    Please take naproxen (Aleve) 400mg once in the morning and once in the evening for 2 weeks    Please work with the physical therapist to improve your strength    If not getting better in 4-6 weeks, please return and we can discuss imaging/injections         Dr. Brent Muro was present for entire visit    Patient seen & discussed with Dr. Merced Mora MD  Sports Medicine Fellow        Attending Note:   I have personally examined this patient and have reviewed the clinical presentation and progress note with the fellow. I agree with the treatment plan as outlined. The plan was formulated with the fellow on the day of the patient's visit. I have reviewed all imaging with the fellow and agree with the findings in the documentation.     Merced Roberson MD, CAQ, CCD  [facility]  Sports Medicine and Bone Health    Nikolas Mora MD

## 2018-08-23 NOTE — LETTER
8/23/2018       RE: Damian Tejada  [address]  [place] [facility] [address]     Dear Colleague,    Thank you for referring your patient, Damian Tejada, to the [facility] EAR NOSE AND THROAT at [facility]. Please see a copy of my visit note below.    The patient presents with a history of a globus sensation and mild dysphagia.  The patient denies odynophagia, hemoptysis, hematemasis, or unexplained weight loss.  The patient reports no change in his gastroesophageal reflux symptoms. The patient reports symptoms of heartburn intermittently.  The patient has visited with Dr. Rick Nissen regarding his tinnitus and hearing loss. The patient reports no change in the tinnitus. He denies sinusitis or rhinitis.     All other systems were reviewed and they are either negative or they are not directly pertinent to this Otolaryngology examination.      Past Medical History:    Past Medical History:   Diagnosis Date     BPH (benign prostatic hyperplasia)      Coronary artery disease 8/14/16    anterior STEMI s/p NATY X2     Hyperlipidemia LDL goal <70      Hypertension        Past Surgical History:    Past Surgical History:   Procedure Laterality Date     HEMORRHOIDECTOMY  Summer 2008     PROCTOPLASTY  Summer 2008     RECTAL SURGERY         Medications:      Current Outpatient Prescriptions:      aspirin EC 81 MG EC tablet, Take 1 tablet (81 mg) by mouth daily (Patient not taking: Reported on 10/20/2017), Disp: 90 tablet, Rfl: 3     atorvastatin (LIPITOR) 80 MG tablet, Take 1 tablet (80 mg) by mouth daily (Patient not taking: Reported on 10/20/2017), Disp: 90 tablet, Rfl: 3     carvedilol (COREG) 3.125 MG tablet, Take 1 tablet (3.125 mg) by mouth 2 times daily (with meals) (Patient not taking: Reported on 10/20/2017), Disp: 90 tablet, Rfl: 3     clopidogrel (PLAVIX) 75 MG tablet, 600 MG (8 pills) first day loading dose, then 75 MG once daily. (Patient not taking: Reported on 10/20/2017),  Disp: 98 tablet, Rfl: 3     nitroglycerin (NITROSTAT) 0.4 MG SL tablet, Place 1 tablet (0.4 mg) under the tongue every 5 minutes as needed for chest pain (Patient not taking: Reported on 10/20/2017), Disp: 25 tablet, Rfl: 3     tamsulosin (FLOMAX) 0.4 MG 24 hr capsule, Take 1 capsule (0.4 mg) by mouth daily (Patient not taking: Reported on 10/20/2017), Disp: 90 capsule, Rfl: 3    Allergies:    Review of patient's allergies indicates no known allergies.    Physical Examination:    The patient is a well developed, well nourished male in no apparent distress.  He is normocephalic, atraumatic with pupils equally round and reactive to light.    Oral Cavity Examination:  Normal mucosa with no masses or lesions  Nasal Examination: Normal mucosa with no masses or lesions, septal deviation to the left  Neurological Examination: Facial nerve function intact and symmetric  Integumentary Examination: No lesions on the skin of the head and neck  Flexible Fiberoptic Laryngoscopy:  Normal nasopharynx, base of tongue, pyriform sinuses, epiglottis, valleculae, false vocal cords, true vocal cords, and larynx.  Normal motion of the vocal cords with no lesions, masses, nodules, or polyps bilaterally.       Assessment and Plan:    The patient presents with a history of gastroesophageal reflux and left ear tinnitus. Based upon his lack of progress with his gastroesophageal reflux using medicalt therapy, the patient will be referred for further evaluation and management to Gastroenterology.     Date:     Surgeon: Dr. Kwame Bain     PreOp Diagnosis:  Gastroesophageal Reflux Disease without Esophagitis    PostOp Diagnosis: Gastroesophageal Reflux Disease without Esophagitis    Procedure: Flexible Fiberoptic Laryngoscopy    Estimated Blood Loss: None    Complications: None    Consent: The patient was informed of the possible complications and probable outcomes of the procedure and the patient gave informed consent.    Fluids:  None    Drains: None    Disposition: to home    Findings: Normal nasopharynx, base of tongue, pyriform sinuses, epiglottis, valleculae, false vocal cords, true vocal cords, and larynx.  Normal motion of the vocal cords with no lesions, masses, nodules, or polyps bilaterally.     Description of Procedure:    The patient was positioned on the clinic room chair. The nose was decongested and anesthetized with 2 puffs in each nostrils lidocaine hcl 4% and oxymetazoline hcl 0.05% topical solution. The flexible fiberoptic scope was passed into the each nostrils and the nasal passages visualized. The scope was passed through the left nostril into the nasopharynx which was normal. The based of tongue and tonsil tissues were visualized and found to be normal. The vocal cords and larynx were visualized and the true vocal cords were visualized and found to be normal.        Again, thank you for allowing me to participate in the care of your patient.      Sincerely,    Kwame Bain MD

## 2018-08-23 NOTE — PROGRESS NOTES
Attending Note:   I have personally examined this patient and have reviewed the clinical presentation and progress note with the fellow. I agree with the treatment plan as outlined. The plan was formulated with the fellow on the day of the patient's visit. I have reviewed all imaging with the fellow and agree with the findings in the documentation.     Merced Roberson MD, CAQ, CCD  [facility]  Sports Medicine and Bone Health

## 2018-08-28 ENCOUNTER — THERAPY VISIT (OUTPATIENT)
Dept: PHYSICAL THERAPY | Facility: CLINIC | Age: 58
End: 2018-08-28
Payer: COMMERCIAL

## 2018-08-28 DIAGNOSIS — M25.512 LEFT SHOULDER PAIN: Primary | ICD-10-CM

## 2018-08-28 PROCEDURE — 97110 THERAPEUTIC EXERCISES: CPT | Mod: GP | Performed by: PHYSICAL THERAPIST

## 2018-08-28 PROCEDURE — 97161 PT EVAL LOW COMPLEX 20 MIN: CPT | Mod: GP | Performed by: PHYSICAL THERAPIST

## 2018-08-28 NOTE — PROGRESS NOTES
"[place] [facility] Initial Evaluation  Subjective:  Patient is a 58 year old male presenting with rehab left shoulder hpi. The history is provided by the patient. No  was used.           Patient woke up on 6-5-18 with left shoulder pain. Pain got worse as day went on and was having difficulty lifting his arm.  He noted no improvement until the last couple days - now more motion and less pain.  Unsure why, but seemed to improve after the 8-23-18 MD appt when \"the doctor made me push/pull many directions\". Pain is intermittent \"deep in shoulder\", describes as \"sharp\", 0-7/10 (but 0-2/10 in the last 2 days).  Symptoms increase with lifting arm forward, side and back and was unable to lie on the left side (was able to last night for the first time).  Symptoms decrease with rest/avoiding motion. .             Pain is the same all the time.     Since onset symptoms are rapidly improving (last couple days).        General health as reported by patient is good.  Pertinent medical history includes:  None.  Medical allergies: no.  Other surgeries include:  None reported.  Current medications:  None as reported by the patient.  Current occupation is Retired 6 months -  25 years (on foot).        Barriers include:  None as reported by the patient.    Red flags:  None as reported by the patient.                        Objective:  Standing Alignment:      Shoulder/UE:  Normal                                       Shoulder Evaluation:  ROM:  AROM:    Flexion:  Left:  139 with ERP    Right:  WNL    Abduction:  Left: 144 with ERP   Right:  WNL    Internal Rotation:  Left:  WNL    Right:  WNL  External Rotation:  Left:  20 at 90 degrees abduction, ERP    Right:  WNL            Extension/Internal Rotation:  Left:  L4    Right:  T10    PROM:      Extension:  Left:  Mod restriction/pain    Right:  WNL                                  Palpation:  normal                                 "         General     ROS  MMT:  Right shoulder/elbow WNL  MMT:  Left shoulder flexion 4/5, abduction 4-/5, ER not tested, IR 5/5    Trial of repeated left shoulder extension with wand overpressure:  Increase flexion 15 degrees, abduction 25 degrees, ER 30 degrees and ext/IR to T12 - less pain at all end ranges     Assessment/Plan:    Patient is a 58 year old male with left side shoulder complaints.    Patient has the following significant findings with corresponding treatment plan.                Diagnosis 1:  Left shoulder pain    Pain -  self management, education, directional preference exercise and home program  Decreased ROM/flexibility - therapeutic exercise and home program  Decreased strength - therapeutic exercise, therapeutic activities and home program  Decreased function - therapeutic activities and home program    Therapy Evaluation Codes:   1) History comprised of:   Personal factors that impact the plan of care:      Time since onset of symptoms.    Comorbidity factors that impact the plan of care are:      None.     Medications impacting care: None.  2) Examination of Body Systems comprised of:   Body structures and functions that impact the plan of care:      Shoulder.   Activity limitations that impact the plan of care are:      Dressing and Lifting.  3) Clinical presentation characteristics are:   Stable/Uncomplicated.  4) Decision-Making    Low complexity using standardized patient assessment instrument and/or measureable assessment of functional outcome.  Cumulative Therapy Evaluation is: Low complexity.    Previous and current functional limitations:  (See Goal Flow Sheet for this information)    Short term and Long term goals: (See Goal Flow Sheet for this information)     Communication ability:  Patient appears to be able to clearly communicate and understand verbal and written communication and follow directions correctly.  Treatment Explanation - The following has been discussed with the  patient:   RX ordered/plan of care  Anticipated outcomes  Possible risks and side effects  This patient would benefit from PT intervention to resume normal activities.   Rehab potential is excellent.    Frequency:  1 X week, once daily  Duration:  for 2-4 weeks  Discharge Plan:  Achieve all LTG.  Independent in home treatment program.  Reach maximal therapeutic benefit.    Please refer to the daily flowsheet for treatment today, total treatment time and time spent performing 1:1 timed codes.

## 2018-08-28 NOTE — MR AVS SNAPSHOT
"              After Visit Summary   8/28/2018    Damian Tejada    MRN: [id number]           Patient Information     Date Of Birth          1960        Visit Information        Provider Department      8/28/2018 12:20 PM Jane Gomez PT [facility]        Today's Diagnoses     Left shoulder pain    -  1       Follow-ups after your visit        Your next 10 appointments already scheduled     Sep 04, 2018 11:40 AM CDT   RICH Extremity with Jane Gomez PT   [facility] ([facility]  )    [address] [place] [address]  [facility] [id number] [phone]            Sep 10, 2018 11:35 AM CDT   RICH Extremity with Jane Gomez PT   [facility] Physical Therapy ([facility]  )    [address] [place] [address]  [facility] [id number] [phone]              Who to contact     If you have questions or need follow up information about today's clinic visit or your schedule please contact [facility] PHYSICAL THERAPY directly at [phone].  Normal or non-critical lab and imaging results will be communicated to you by Jumpzterhart, letter or phone within 4 business days after the clinic has received the results. If you do not hear from us within 7 days, please contact the clinic through Jumpzterhart or phone. If you have a critical or abnormal lab result, we will notify you by phone as soon as possible.  Submit refill requests through [employer] or call your pharmacy and they will forward the refill request to us. Please allow 3 business days for your refill to be completed.          Additional Information About Your Visit        MyChart Information     [employer] lets you send messages to your doctor, view your test results, renew your prescriptions, schedule appointments and more. To sign up, go to [email or web address] . Click on \"Log in\" on " "the left side of the screen, which will take you to the Welcome page. Then click on \"Sign up Now\" on the right side of the page.     You will be asked to enter the access code listed below, as well as some personal information. Please follow the directions to create your username and password.     Your access code is: [email or web address]  Expires: 2018  6:30 AM     Your access code will  in 90 days. If you need help or a new code, please call your [place] clinic or [phone].        Care EveryWhere ID     This is your Care EveryWhere ID. This could be used by other organizations to access your [place] medical records  [id number]         Blood Pressure from Last 3 Encounters:   18 (!) 171/116   17 (!) 152/94   16 125/86    Weight from Last 3 Encounters:   18 83.9 kg (185 lb)   17 82.1 kg (181 lb)   17 79.8 kg (176 lb)              We Performed the Following     HC PT EVAL, LOW COMPLEXITY     RICH INITIAL EVAL REPORT     THERAPEUTIC EXERCISES        Primary Care Provider    Italo Helms MD       No address on file        Equal Access to Services     KERI MONTOYA : Hadii dariel de andao Sopam, waaxda luqadaha, qaybta kaalmada adeegyada, pavel patel . So [facility] [phone].    ATENCIÓN: Si habla español, tiene a pérez disposición servicios gratuitos de asistencia lingüística. Llame al [phone].    We comply with applicable federal civil rights laws and [place] laws. We do not discriminate on the basis of race, color, national origin, age, disability, sex, sexual orientation, or gender identity.            Thank you!     Thank you for choosing [facility] PH[facility] THERAP[facility]  for your care. Our goal is always to provide you with excellent care. Hearing back from our patients is one way we can continue to improve our services. Please take a few minutes to complete the written survey that you may receive in " the mail after your visit with us. Thank you!             Your Updated Medication List - Protect others around you: Learn how to safely use, store and throw away your medicines at www.disposemymeds.org.          This list is accurate as of 8/28/18  3:32 PM.  Always use your most recent med list.                   Brand Name Dispense Instructions for use Diagnosis    aspirin 81 MG EC tablet     90 tablet    Take 1 tablet (81 mg) by mouth daily    ST elevation (STEMI) myocardial infarction involving left anterior descending coronary artery (H)       atorvastatin 80 MG tablet    LIPITOR    90 tablet    Take 1 tablet (80 mg) by mouth daily    ACS (acute coronary syndrome) (H)       carvedilol 3.125 MG tablet    COREG    90 tablet    Take 1 tablet (3.125 mg) by mouth 2 times daily (with meals)    ACS (acute coronary syndrome) (H)       clopidogrel 75 MG tablet    PLAVIX    98 tablet    600 MG (8 pills) first day loading dose, then 75 MG once daily.    ST elevation myocardial infarction involving left main coronary artery (H)       nitroGLYcerin 0.4 MG sublingual tablet    NITROSTAT    25 tablet    Place 1 tablet (0.4 mg) under the tongue every 5 minutes as needed for chest pain    ACS (acute coronary syndrome) (H)       tamsulosin 0.4 MG capsule    FLOMAX    90 capsule    Take 1 capsule (0.4 mg) by mouth daily    Benign non-nodular prostatic hyperplasia with lower urinary tract symptoms

## 2018-09-04 ENCOUNTER — THERAPY VISIT (OUTPATIENT)
Dept: PHYSICAL THERAPY | Facility: CLINIC | Age: 58
End: 2018-09-04
Payer: COMMERCIAL

## 2018-09-04 DIAGNOSIS — M25.512 LEFT SHOULDER PAIN: ICD-10-CM

## 2018-09-04 PROCEDURE — 97110 THERAPEUTIC EXERCISES: CPT | Mod: GP | Performed by: PHYSICAL THERAPIST

## 2018-09-10 ENCOUNTER — THERAPY VISIT (OUTPATIENT)
Dept: PHYSICAL THERAPY | Facility: CLINIC | Age: 58
End: 2018-09-10
Payer: COMMERCIAL

## 2018-09-10 DIAGNOSIS — M25.512 LEFT SHOULDER PAIN: ICD-10-CM

## 2018-09-10 PROCEDURE — 97110 THERAPEUTIC EXERCISES: CPT | Mod: GP | Performed by: PHYSICAL THERAPIST

## 2018-09-17 ENCOUNTER — THERAPY VISIT (OUTPATIENT)
Dept: PHYSICAL THERAPY | Facility: CLINIC | Age: 58
End: 2018-09-17
Payer: COMMERCIAL

## 2018-09-17 DIAGNOSIS — M25.512 LEFT SHOULDER PAIN: ICD-10-CM

## 2018-09-17 PROCEDURE — 97110 THERAPEUTIC EXERCISES: CPT | Mod: GP | Performed by: PHYSICAL THERAPIST

## 2018-09-17 NOTE — MR AVS SNAPSHOT
"              After Visit Summary   2018    Damian Tejada    MRN: [id number]           Patient Information     Date Of Birth          1960        Visit Information        Provider Department      2018 11:35 AM Jane Gomez PT [facility] Physical Therapy        Today's Diagnoses     Left shoulder pain           Follow-ups after your visit        Who to contact     If you have questions or need follow up information about today's clinic visit or your schedule please contact [facility] PHYSICAL THERAPY directly at [phone].  Normal or non-critical lab and imaging results will be communicated to you by Cornicehart, letter or phone within 4 business days after the clinic has received the results. If you do not hear from us within 7 days, please contact the clinic through [employer] or phone. If you have a critical or abnormal lab result, we will notify you by phone as soon as possible.  Submit refill requests through [employer] or call your pharmacy and they will forward the refill request to us. Please allow 3 business days for your refill to be completed.          Additional Information About Your Visit        MyChart Information     [employer] lets you send messages to your doctor, view your test results, renew your prescriptions, schedule appointments and more. To sign up, go to [email or web address] . Click on \"Log in\" on the left side of the screen, which will take you to the Welcome page. Then click on \"Sign up Now\" on the right side of the page.     You will be asked to enter the access code listed below, as well as some personal information. Please follow the directions to create your username and password.     Your access code is: [email or web address]  Expires: 2018  6:30 AM     Your access code will  in 90 days. If you need help or a new code, please call your [place] clinic or [phone].        Care EveryWhere ID     This " is your Care EveryWhere ID. This could be used by other organizations to access your [place] medical records  [id number]         Blood Pressure from Last 3 Encounters:   08/23/18 (!) 171/116   03/05/17 (!) 152/94   12/21/16 125/86    Weight from Last 3 Encounters:   08/23/18 83.9 kg (185 lb)   05/30/17 82.1 kg (181 lb)   04/20/17 79.8 kg (176 lb)              We Performed the Following     THERAPEUTIC EXERCISES        Primary Care Provider    Italo Helms MD       No address on file        Equal Access to Services     [facility]: Hadii aad ku hadasho Soomaali, waaxda luqadaha, qaybta kaalmada adeenrriqueyacale, pavel patel . So [facility] [phone].    ATENCIÓN: Si habla español, tiene a pérez disposición servicios gratuitos de asistencia lingüística. Llame al [phone].    We comply with applicable federal civil rights laws and [place] laws. We do not discriminate on the basis of race, color, national origin, age, disability, sex, sexual orientation, or gender identity.            Thank you!     Thank you for choosing [facility] PHYSICAL THERAPY  for your care. Our goal is always to provide you with excellent care. Hearing back from our patients is one way we can continue to improve our services. Please take a few minutes to complete the written survey that you may receive in the mail after your visit with us. Thank you!             Your Updated Medication List - Protect others around you: Learn how to safely use, store and throw away your medicines at www.disposemymeds.org.          This list is accurate as of 9/17/18  1:10 PM.  Always use your most recent med list.                   Brand Name Dispense Instructions for use Diagnosis    aspirin 81 MG EC tablet     90 tablet    Take 1 tablet (81 mg) by mouth daily    ST elevation (STEMI) myocardial infarction involving left anterior descending coronary artery (H)       atorvastatin 80 MG tablet     LIPITOR    90 tablet    Take 1 tablet (80 mg) by mouth daily    ACS (acute coronary syndrome) (H)       carvedilol 3.125 MG tablet    COREG    90 tablet    Take 1 tablet (3.125 mg) by mouth 2 times daily (with meals)    ACS (acute coronary syndrome) (H)       clopidogrel 75 MG tablet    PLAVIX    98 tablet    600 MG (8 pills) first day loading dose, then 75 MG once daily.    ST elevation myocardial infarction involving left main coronary artery (H)       nitroGLYcerin 0.4 MG sublingual tablet    NITROSTAT    25 tablet    Place 1 tablet (0.4 mg) under the tongue every 5 minutes as needed for chest pain    ACS (acute coronary syndrome) (H)       tamsulosin 0.4 MG capsule    FLOMAX    90 capsule    Take 1 capsule (0.4 mg) by mouth daily    Benign non-nodular prostatic hyperplasia with lower urinary tract symptoms

## 2018-10-16 ENCOUNTER — TRANSFERRED RECORDS (OUTPATIENT)
Dept: HEALTH INFORMATION MANAGEMENT | Facility: CLINIC | Age: 58
End: 2018-10-16

## 2018-11-13 ENCOUNTER — TRANSFERRED RECORDS (OUTPATIENT)
Dept: HEALTH INFORMATION MANAGEMENT | Facility: CLINIC | Age: 58
End: 2018-11-13

## 2019-01-08 ENCOUNTER — TRANSFERRED RECORDS (OUTPATIENT)
Dept: HEALTH INFORMATION MANAGEMENT | Facility: CLINIC | Age: 59
End: 2019-01-08

## 2019-01-14 ENCOUNTER — TRANSFERRED RECORDS (OUTPATIENT)
Dept: HEALTH INFORMATION MANAGEMENT | Facility: CLINIC | Age: 59
End: 2019-01-14

## 2019-01-29 ENCOUNTER — OFFICE VISIT (OUTPATIENT)
Dept: INTERNAL MEDICINE | Facility: CLINIC | Age: 59
End: 2019-01-29
Payer: COMMERCIAL

## 2019-01-29 VITALS
BODY MASS INDEX: 179.71 KG/M2 | SYSTOLIC BLOOD PRESSURE: 172 MMHG | RESPIRATION RATE: 20 BRPM | DIASTOLIC BLOOD PRESSURE: 99 MMHG | HEART RATE: 80 BPM | WEIGHT: 183.2 LBS

## 2019-01-29 DIAGNOSIS — F43.9 STRESS: Primary | ICD-10-CM

## 2019-01-29 DIAGNOSIS — I10 ESSENTIAL HYPERTENSION: ICD-10-CM

## 2019-01-29 DIAGNOSIS — I25.10 CORONARY ARTERY DISEASE INVOLVING NATIVE CORONARY ARTERY OF NATIVE HEART WITHOUT ANGINA PECTORIS: ICD-10-CM

## 2019-01-29 LAB
ANION GAP SERPL CALCULATED.3IONS-SCNC: 5 MMOL/L (ref 3–14)
BUN SERPL-MCNC: 11 MG/DL (ref 7–30)
CALCIUM SERPL-MCNC: 9.3 MG/DL (ref 8.5–10.1)
CHLORIDE SERPL-SCNC: 104 MMOL/L (ref 94–109)
CHOLEST SERPL-MCNC: 231 MG/DL
CO2 SERPL-SCNC: 29 MMOL/L (ref 20–32)
CREAT SERPL-MCNC: 1.26 MG/DL (ref 0.66–1.25)
ERYTHROCYTE [DISTWIDTH] IN BLOOD BY AUTOMATED COUNT: 13.5 % (ref 10–15)
GFR SERPL CREATININE-BSD FRML MDRD: 63 ML/MIN/{1.73_M2}
GLUCOSE SERPL-MCNC: 119 MG/DL (ref 70–99)
HBA1C MFR BLD: 6.3 % (ref 0–5.6)
HCT VFR BLD AUTO: 47.3 % (ref 40–53)
HDLC SERPL-MCNC: 61 MG/DL
HGB BLD-MCNC: 14.6 G/DL (ref 13.3–17.7)
LDLC SERPL CALC-MCNC: 134 MG/DL
MCH RBC QN AUTO: 24.7 PG (ref 26.5–33)
MCHC RBC AUTO-ENTMCNC: 30.9 G/DL (ref 31.5–36.5)
MCV RBC AUTO: 80 FL (ref 78–100)
NONHDLC SERPL-MCNC: 170 MG/DL
PLATELET # BLD AUTO: 289 10E9/L (ref 150–450)
POTASSIUM SERPL-SCNC: 4.2 MMOL/L (ref 3.4–5.3)
RBC # BLD AUTO: 5.9 10E12/L (ref 4.4–5.9)
SODIUM SERPL-SCNC: 138 MMOL/L (ref 133–144)
TRIGL SERPL-MCNC: 184 MG/DL
WBC # BLD AUTO: 4.6 10E9/L (ref 4–11)

## 2019-01-29 RX ORDER — CARVEDILOL 6.25 MG/1
6.25 TABLET ORAL 2 TIMES DAILY WITH MEALS
Qty: 180 TABLET | Refills: 3 | Status: SHIPPED | OUTPATIENT
Start: 2019-01-29 | End: 2021-06-01

## 2019-01-29 RX ORDER — ESCITALOPRAM OXALATE 10 MG/1
10 TABLET ORAL DAILY
Qty: 90 TABLET | Refills: 3 | Status: SHIPPED | OUTPATIENT
Start: 2019-01-29 | End: 2021-06-01

## 2019-01-29 RX ORDER — ATORVASTATIN CALCIUM 80 MG/1
80 TABLET, FILM COATED ORAL DAILY
Qty: 90 TABLET | Refills: 3 | Status: SHIPPED | OUTPATIENT
Start: 2019-01-29 | End: 2020-04-10

## 2019-01-29 ASSESSMENT — PAIN SCALES - GENERAL: PAINLEVEL: NO PAIN (0)

## 2019-01-29 NOTE — PROGRESS NOTES
PRIMARY CARE CENTER       SUBJECTIVE:  Damian Tejada is a 58 year old male who comes in for evaluation of blood pressure. He has also been having problems with stress. A few years ago was on medication for BP (Coreg, 3.125 mg BID). Stopped b/c blodo pressure was back to normal. No chest pain. No shortness of breath. No headaches, no changes in vision.     Regarding stress, he feels it in his face and head. Retired one year ago. Over the summer, tried to do some exercise/activity, however, this has not helped his stress.     Past Medical History:   Diagnosis Date     BPH (benign prostatic hyperplasia)      Coronary artery disease 8/14/16    anterior STEMI s/p NATY X2     Hyperlipidemia LDL goal <70      Hypertension      Past Surgical History:   Procedure Laterality Date     HEMORRHOIDECTOMY  Summer 2008     PROCTOPLASTY  Summer 2008     RECTAL SURGERY       Family History   Problem Relation Age of Onset     Skin Cancer No family hx of      Melanoma No family hx of      Social History     Tobacco Use     Smoking status: Never Smoker     Smokeless tobacco: Never Used   Substance Use Topics     Alcohol use: No     Drug use: No       Medications and allergies reviewed by me today.     ROS:   Constitutional, neuro, ENT, endocrine, pulmonary, cardiac, gastrointestinal, genitourinary, musculoskeletal, integument and psychiatric systems are negative, except as otherwise noted.    OBJECTIVE:    BP (!) 172/99 (BP Location: Right arm, Patient Position: Sitting, Cuff Size: Adult Regular)   Pulse 80   Resp 20   Wt 83.1 kg (183 lb 3.2 oz)   .71 kg/m     Wt Readings from Last 1 Encounters:   01/29/19 83.1 kg (183 lb 3.2 oz)       GENERAL APPEARANCE: healthy, alert and no distress     EYES: EOMI,  PERRL     HENT: ear canals and TM's normal and nose and mouth without ulcers or lesions     NECK: no adenopathy, no asymmetry, masses, or scars and thyroid normal to palpation     RESP: lungs clear to  auscultation - no rales, rhonchi or wheezes     CV: regular rates and rhythm, normal S1 S2, no S3 or S4 and no murmur, click or rub     ABDOMEN:  soft, nontender, no HSM or masses and bowel sounds normal     MS: extremities normal- no gross deformities noted, no evidence of inflammation in joints, FROM in all extremities.     SKIN: no suspicious lesions or rashes     NEURO: Normal strength and tone, sensory exam grossly normal, mentation intact and speech normal     PSYCH: mentation appears normal. and affect normal/bright     LYMPHATICS: No cervical adenopathy     ASSESSMENT/PLAN:    Damian was seen today for hypertension. Discussed that pt needs to be on meds long-term for both HTN and also given hx of STEMI. Restarted Coreg, since that is what he was on in the past. Also restarting lipitor and ASA. Get routine labs today. Lexapro b/c of stress (suspect this is anxiety, but pt is unclear).     Diagnoses and all orders for this visit:    Stress  -     escitalopram (LEXAPRO) 10 MG tablet; Take 1 tablet (10 mg) by mouth daily    Coronary artery disease involving native coronary artery of native heart without angina pectoris  -     carvedilol (COREG) 6.25 MG tablet; Take 1 tablet (6.25 mg) by mouth 2 times daily (with meals)  -     aspirin (ASA) 81 MG tablet; Take 1 tablet (81 mg) by mouth daily  -     atorvastatin (LIPITOR) 80 MG tablet; Take 1 tablet (80 mg) by mouth daily  -     Basic metabolic panel; Future  -     Hemoglobin A1c; Future  -     Lipid panel reflex to direct LDL Non-fasting; Future  -     CBC with platelets; Future    Essential hypertension  -     carvedilol (COREG) 6.25 MG tablet; Take 1 tablet (6.25 mg) by mouth 2 times daily (with meals)  -     Basic metabolic panel; Future         Pt should return to clinic for f/u with me in 4 weeks.      Tawanna Garcia MD  01/29/19

## 2019-01-29 NOTE — NURSING NOTE
Chief Complaint   Patient presents with     Hypertension     Patient is here for hypertension       Tacos Guerrero CMA (AAMA) at 4:12 PM on 1/29/2019

## 2019-01-29 NOTE — PATIENT INSTRUCTIONS
[facility] Medication Refill Request Information:  * Please contact your pharmacy regarding ANY request for medication refills.  ** [facility] Prescription Fax = [phone]  * Please allow 3 business days for routine medication refills.  * Please allow 5 business days for controlled substance medication refills.     [facility] Test Result notification information:  *You will be notified with in 7-10 days of your appointment day regarding the results of your test.  If you are on MyChart you will be notified as soon as the provider has reviewed the results and signed off on them.    [facility] [phone]

## 2019-02-22 ENCOUNTER — TRANSFERRED RECORDS (OUTPATIENT)
Dept: HEALTH INFORMATION MANAGEMENT | Facility: CLINIC | Age: 59
End: 2019-02-22

## 2019-04-22 PROBLEM — M25.512 LEFT SHOULDER PAIN: Status: RESOLVED | Noted: 2018-08-28 | Resolved: 2019-04-22

## 2019-04-22 NOTE — PROGRESS NOTES
"[place] [facility] Initial Evaluation  Subjective:  HPI                    Objective:  System    Physical Exam    General     ROS    Assessment/Plan:    DISCHARGE REPORT    Progress reporting period is from 8-28-18 to 9-17-18, 4 visits.       SUBJECTIVE  Patient initially c/o left shoulder pain which was intermittent \"deep in shoulder\", described as \"sharp\", 0-7/10 initially (but 0-2/10 in the last 2 days).  Symptoms increased with lifting arm forward, side and back and was unable to lie on the left side (was able to last night for the first   At last visit patient reported continued improvement.  Symptoms 0-1/10, only with flexion/ER end range position and shoulder extension in abducted position.  Doing HEP 5x/day.  Has been lying on the left side, ~20, then switches sides (not due to pain).     Pain level: 0/10.     Initial Pain level: 2/10.   Changes in function:  Yes (See Goal flowsheet attached for changes in current functional level)  Adverse reaction to treatment or activity: None    OBJECTIVE  AROM left shoulder flexion and abduction with slight limitation, no symptoms, ext/IR to T11, ER (at 90 abduction) to 74 degrees.       ASSESSMENT/PLAN  Updated problem list and treatment plan: Diagnosis 1:  L shoulder pain    Pain -  home program  STG/LTGs have been met or progress has been made towards goals:  Yes (See Goal flow sheet completed today.)  Assessment of Progress: The patient's condition is improving.  Self Management Plans:  Patient is independent in a home treatment program.  I have re-evaluated this patient and find that the nature, scope, duration and intensity of the therapy is appropriate for the medical condition of the patient.  Damian continues to require the following intervention to meet STG and LTG's:  PT intervention is no longer required to meet STG/LTG.    Recommendations:  This patient is ready to be discharged from therapy and continue their home treatment program.    Please " refer to the daily flowsheet for treatment today, total treatment time and time spent performing 1:1 timed codes.

## 2019-09-26 ENCOUNTER — OFFICE VISIT (OUTPATIENT)
Dept: OTOLARYNGOLOGY | Facility: CLINIC | Age: 59
End: 2019-09-26
Payer: COMMERCIAL

## 2019-09-26 VITALS
WEIGHT: 193 LBS | BODY MASS INDEX: 31.02 KG/M2 | DIASTOLIC BLOOD PRESSURE: 94 MMHG | RESPIRATION RATE: 21 BRPM | SYSTOLIC BLOOD PRESSURE: 148 MMHG | HEIGHT: 66 IN | HEART RATE: 51 BPM

## 2019-09-26 DIAGNOSIS — J30.1 ALLERGIC RHINITIS DUE TO POLLEN, UNSPECIFIED SEASONALITY: Primary | ICD-10-CM

## 2019-09-26 DIAGNOSIS — J30.9 ALLERGIC RHINITIS, UNSPECIFIED SEASONALITY, UNSPECIFIED TRIGGER: ICD-10-CM

## 2019-09-26 ASSESSMENT — PAIN SCALES - GENERAL: PAINLEVEL: NO PAIN (0)

## 2019-09-26 ASSESSMENT — MIFFLIN-ST. JEOR: SCORE: 1645.44

## 2019-09-26 NOTE — LETTER
9/26/2019       RE: Damian Tejada  [address]  [facility] [address]     Dear Colleague,    Thank you for referring your patient, Damian Tejada, to the [facility] EAR NOSE AND THROAT at [facility]. Please see a copy of my visit note below.    The patient presents with a history of chronic rhinitis and post-nasal drainage and chronic throat clearing. The patient denies sinusitis, facial pain or purulent nasal discharge. The patient denies chronic or recurrent tonsillitis, chronic or recurrent pharyngitis. The patient denies otalgia, otorrhea, eustachian tube dysfunction, ear infections, dizziness or tinnitus. He denies gastroesophageal reflux or heartburn.    All other systems were reviewed and they are either negative or they are not directly pertinent to this Otolaryngology examination.      Past Medical History:    Past Medical History:   Diagnosis Date     BPH (benign prostatic hyperplasia)      Coronary artery disease 8/14/16    anterior STEMI s/p NATY X2     Hyperlipidemia LDL goal <70      Hypertension        Past Surgical History:    Past Surgical History:   Procedure Laterality Date     HEMORRHOIDECTOMY  Summer 2008     PROCTOPLASTY  Summer 2008     RECTAL SURGERY         Medications:      Current Outpatient Medications:      aspirin (ASA) 81 MG tablet, Take 1 tablet (81 mg) by mouth daily, Disp: 90 tablet, Rfl: 3     atorvastatin (LIPITOR) 80 MG tablet, Take 1 tablet (80 mg) by mouth daily, Disp: 90 tablet, Rfl: 3     carvedilol (COREG) 6.25 MG tablet, Take 1 tablet (6.25 mg) by mouth 2 times daily (with meals), Disp: 180 tablet, Rfl: 3     escitalopram (LEXAPRO) 10 MG tablet, Take 1 tablet (10 mg) by mouth daily, Disp: 90 tablet, Rfl: 3    Allergies:    Patient has no known allergies.    Physical Examination:    The patient is a well developed, well nourished male in no apparent distress.  He is normocepahlic, atraumatic with pupils equally round and  reactive to light.    Oral Cavity Examination: Normal Mucosa with no masses or lesions  Nasal Examination: Congested nasal turbinates and nasal mucosa with no masses or lesions, deviated septum to the left. Examined with a flexible fiberoptic scope.   Ear Examination: Ear canals clear, tympanic membranes and middle ear spaces normal  Neurological Examination: Facial nerve function intact and symmetric  Integumentary Examination: No lesions on the skin of the head or neck  Neck Examination: No masses or lesions, no lymphadenopathy  Endocrine Examination: Normal thyroid examination  Flexible Fiberoptic Laryngoscopy:  Normal nasopharynx, base of tongue, pyriform sinuses, epiglottis, valleculae, false vocal cords, true vocal cords, and larynx.  Normal motion of the vocal cords with no lesions, masses, nodules, or polyps bilaterally.     Assessment and Plan:    The patient presents with a history of chronic rhinitis and post-nasal drainage and chronic throat clearing. He will be referred to Dr. Collin Lugo for allergy evaluation and he will be seen again after this evaluation is completed.     PreOp Diagnosis:  Chronic Throat Clearing Cough    PostOp Diagnosis: Chronic Throat Clearing Cough    Procedure: Flexible Fiberoptic Laryngoscopy    Estimated Blood Loss: None    Complications: None    Consent: The patient was informed of the possible complications and probable outcomes of the procedure and the patient gave informed consent.    Fluids: None    Drains: None    Disposition: to home    Findings: Normal nasopharynx, base of tongue, pyriform sinuses, epiglottis, valleculae, false vocal cords, true vocal cords, and larynx.  Normal motion of the vocal cords with no lesions, masses, nodules, or polyps bilaterally.     Description of Procedure:    The patient was positioned on the clinic room chair. The nose was decongested and anesthetized with 2 puffs in each nostrils lidocaine hcl 4% and oxymetazoline hcl 0.05%  topical solution. The flexible fiberoptic scope was passed into the each nostrils and the nasal passages visualized. The scope was passed through the left nostril into the nasopharynx. There was edema of the turbinates and post-nasal mucous drainage. The base of tongue and tonsil tissues were visualized and found to be normal. The vocal cords and larynx were visualized and the true vocal cords were visualized and found to be normal.       CC: Dr. Tawanna Garcia.       Again, thank you for allowing me to participate in the care of your patient.      Sincerely,    Kwame Bain MD

## 2019-09-26 NOTE — PATIENT INSTRUCTIONS
1.  You were seen in the ENT Clinic today by Dr. Bain.  If you have any questions or concerns after your appointment, please call [phone]. Press option #1 for scheduling related needs. Press option #3 for Nurse advice.    2.  Please schedule an appointment for the following:   - Dr. Lugo - Allergy Consultation    3.  Plan is to return to clinic to see Dr. Bain AFTER completion of Allergy Consultation.      Harper Ortega LPN  [facility] Otolaryngology  [phone]    The patient presents with a history of chronic rhinitis and post-nasal drainage and chronic throat clearing. He will be referred to Dr. Collin Lugo for allergy evaluation and he will be seen again after this evaluation is completed.

## 2019-09-26 NOTE — PROGRESS NOTES
The patient presents with a history of chronic rhinitis and post-nasal drainage and chronic throat clearing. The patient denies sinusitis, facial pain or purulent nasal discharge. The patient denies chronic or recurrent tonsillitis, chronic or recurrent pharyngitis. The patient denies otalgia, otorrhea, eustachian tube dysfunction, ear infections, dizziness or tinnitus. He denies gastroesophageal reflux or heartburn.    All other systems were reviewed and they are either negative or they are not directly pertinent to this Otolaryngology examination.      Past Medical History:    Past Medical History:   Diagnosis Date     BPH (benign prostatic hyperplasia)      Coronary artery disease 8/14/16    anterior STEMI s/p NATY X2     Hyperlipidemia LDL goal <70      Hypertension        Past Surgical History:    Past Surgical History:   Procedure Laterality Date     HEMORRHOIDECTOMY  Summer 2008     PROCTOPLASTY  Summer 2008     RECTAL SURGERY         Medications:      Current Outpatient Medications:      aspirin (ASA) 81 MG tablet, Take 1 tablet (81 mg) by mouth daily, Disp: 90 tablet, Rfl: 3     atorvastatin (LIPITOR) 80 MG tablet, Take 1 tablet (80 mg) by mouth daily, Disp: 90 tablet, Rfl: 3     carvedilol (COREG) 6.25 MG tablet, Take 1 tablet (6.25 mg) by mouth 2 times daily (with meals), Disp: 180 tablet, Rfl: 3     escitalopram (LEXAPRO) 10 MG tablet, Take 1 tablet (10 mg) by mouth daily, Disp: 90 tablet, Rfl: 3    Allergies:    Patient has no known allergies.    Physical Examination:    The patient is a well developed, well nourished male in no apparent distress.  He is normocepahlic, atraumatic with pupils equally round and reactive to light.    Oral Cavity Examination: Normal Mucosa with no masses or lesions  Nasal Examination: Congested nasal turbinates and nasal mucosa with no masses or lesions, deviated septum to the left. Examined with a flexible fiberoptic scope.   Ear Examination: Ear canals clear, tympanic  membranes and middle ear spaces normal  Neurological Examination: Facial nerve function intact and symmetric  Integumentary Examination: No lesions on the skin of the head or neck  Neck Examination: No masses or lesions, no lymphadenopathy  Endocrine Examination: Normal thyroid examination  Flexible Fiberoptic Laryngoscopy:  Normal nasopharynx, base of tongue, pyriform sinuses, epiglottis, valleculae, false vocal cords, true vocal cords, and larynx.  Normal motion of the vocal cords with no lesions, masses, nodules, or polyps bilaterally.     Assessment and Plan:    The patient presents with a history of chronic rhinitis and post-nasal drainage and chronic throat clearing. He will be referred to Dr. Collin Lugo for allergy evaluation and he will be seen again after this evaluation is completed.     PreOp Diagnosis:  Chronic Throat Clearing Cough    PostOp Diagnosis: Chronic Throat Clearing Cough    Procedure: Flexible Fiberoptic Laryngoscopy    Estimated Blood Loss: None    Complications: None    Consent: The patient was informed of the possible complications and probable outcomes of the procedure and the patient gave informed consent.    Fluids: None    Drains: None    Disposition: to home    Findings: Normal nasopharynx, base of tongue, pyriform sinuses, epiglottis, valleculae, false vocal cords, true vocal cords, and larynx.  Normal motion of the vocal cords with no lesions, masses, nodules, or polyps bilaterally.     Description of Procedure:    The patient was positioned on the clinic room chair. The nose was decongested and anesthetized with 2 puffs in each nostrils lidocaine hcl 4% and oxymetazoline hcl 0.05% topical solution. The flexible fiberoptic scope was passed into the each nostrils and the nasal passages visualized. The scope was passed through the left nostril into the nasopharynx. There was edema of the turbinates and post-nasal mucous drainage. The base of tongue and tonsil tissues were  visualized and found to be normal. The vocal cords and larynx were visualized and the true vocal cords were visualized and found to be normal.       CC: Dr. aTwanna Garcia.

## 2019-09-26 NOTE — NURSING NOTE
"Chief Complaint   Patient presents with     RECHECK     post nasal drip      Blood pressure (!) 148/94, pulse 51, resp. rate 21, height 1.68 m (5' 6.14\"), weight 87.5 kg (193 lb).    Constantin Mora LPN'    "

## 2019-10-02 ENCOUNTER — PRE VISIT (OUTPATIENT)
Dept: ALLERGY | Facility: CLINIC | Age: 59
End: 2019-10-02

## 2019-10-02 NOTE — TELEPHONE ENCOUNTER
FUTURE VISIT INFORMATION      FUTURE VISIT INFORMATION:    Date: 11.18.19    Time: 10:00    Location:  Allergy  REFERRAL INFORMATION:    Referring provider:  Dr. Kwame Bain    Referring providers clinic:  ENT    Reason for visit/diagnosis:  new, allergy consult, Allergic rhinitis, unspecified seasonality, unspecified trigger [J30.9],  csc verified    RECORDS REQUESTED FROM:       Clinic name Comments Records Status Imaging Status   ENT 9.26.19 Dr. Taya Arellano    ENT 11.3.15 Dr. Marcy Hurst Epic

## 2019-11-11 ENCOUNTER — TELEPHONE (OUTPATIENT)
Dept: ALLERGY | Facility: CLINIC | Age: 59
End: 2019-11-11

## 2019-11-11 NOTE — TELEPHONE ENCOUNTER
Unable to LVM with patient regarding staying off of antihistamines for the remainder of the week leading up to their appointment on Monday 11/18/19 due to busy line.

## 2019-11-18 ENCOUNTER — PRE VISIT (OUTPATIENT)
Dept: ALLERGY | Facility: CLINIC | Age: 59
End: 2019-11-18

## 2019-11-20 ENCOUNTER — TELEPHONE (OUTPATIENT)
Dept: OTOLARYNGOLOGY | Facility: CLINIC | Age: 59
End: 2019-11-20

## 2019-11-20 NOTE — TELEPHONE ENCOUNTER
"Attempted to contact patient and emergency contact to cancel appointment on 11/21 with Dr. Bain. Both phone numbers were disconnected.    Appointment with Dr. Bani was CANCELLED because the patient is supposed to follow-up w/ Dr. Bain AFTER completion of Allergy Consult w/ Dr. Lugo which it looks like the patient \"No Showed\" on 11/18/19.       "

## 2019-12-02 ENCOUNTER — TELEPHONE (OUTPATIENT)
Dept: ALLERGY | Facility: CLINIC | Age: 59
End: 2019-12-02

## 2019-12-02 NOTE — TELEPHONE ENCOUNTER
Unable to LVM with patient regarding staying off of antihistamines for the remainder of the week leading up to their appointment on Monday 12/9/19.

## 2019-12-09 ENCOUNTER — OFFICE VISIT (OUTPATIENT)
Dept: ALLERGY | Facility: CLINIC | Age: 59
End: 2019-12-09
Payer: COMMERCIAL

## 2019-12-09 DIAGNOSIS — J30.0 VASOMOTOR RHINITIS: Primary | ICD-10-CM

## 2019-12-09 ASSESSMENT — PAIN SCALES - GENERAL: PAINLEVEL: NO PAIN (0)

## 2019-12-09 NOTE — Clinical Note
"    12/9/2019         RE: Damian Tejada  [address]  [facility] [address]        Dear Colleague,    Thank you for referring your patient, Damian Tejada, to the [facility] ALLERGY. Please see a copy of my visit note below.    Reason for Visit  Damian Tejada is a 59 year old male who is referred by Tawanna Mendez for allergy evaluation    Allergy HPI  Chronic rhinitis, post-nasal drip and throat clearing: History of rhinorrhea and post nasal drip. Has been seeing doctors for sinus issues throughout his life with intermittent medication use. Symptoms improve over the summer. Worse outside in the cold weather. Better when indoors. Coughs up mucus from back of throat. Has never taken a daily allergy medication. Has used nasal spray 5-6 years ago that was not helpful. No pets. No history of asthma or eczema. No headaches, cough, fevers, conjunctivitis, rash, or oral allergy symptoms.    9/26/19 Laryngoscopy: \"The scope was passed through the left nostril into the nasopharynx. There was edema of the turbinates and post-nasal mucous drainage. The base of tongue and tonsil tissues were visualized and found to be normal. The vocal cords and larynx were visualized and the true vocal cords were visualized and found to be normal.\"      The patient was seen and examined by Dulce Mckeon MD, MD   Current Outpatient Medications   Medication     aspirin (ASA) 81 MG tablet     atorvastatin (LIPITOR) 80 MG tablet     carvedilol (COREG) 6.25 MG tablet     escitalopram (LEXAPRO) 10 MG tablet     No current facility-administered medications for this visit.      No Known Allergies  Social History     Socioeconomic History     Marital status: Single     Spouse name: Not on file     Number of children: Not on file     Years of education: Not on file     Highest education level: Not on file   Occupational History     Not on file   Social Needs     Financial resource strain: Not on file     Food insecurity:     " "Worry: Not on file     Inability: Not on file     Transportation needs:     Medical: Not on file     Non-medical: Not on file   Tobacco Use     Smoking status: Never Smoker     Smokeless tobacco: Never Used   Substance and Sexual Activity     Alcohol use: No     Drug use: No     Sexual activity: Never   Lifestyle     Physical activity:     Days per week: Not on file     Minutes per session: Not on file     Stress: Not on file   Relationships     Social connections:     Talks on phone: Not on file     Gets together: Not on file     Attends Latter day service: Not on file     Active member of club or organization: Not on file     Attends meetings of clubs or organizations: Not on file     Relationship status: Not on file     Intimate partner violence:     Fear of current or ex partner: Not on file     Emotionally abused: Not on file     Physically abused: Not on file     Forced sexual activity: Not on file   Other Topics Concern     Parent/sibling w/ CABG, MI or angioplasty before 65F 55M? Not Asked   Social History Narrative    Mr. Tejada lives alone. He has never been , has no children. Last sexually active . He does not smoke cigarettes or drink alcohol, and denies illicit drug use. He is from [facility], moved to the  25 years ago.         His mother   \"old age\" around age 70. Father is alive and healthy at 75, has 5 siblings who are all healthy.      Past Medical History:   Diagnosis Date     BPH (benign prostatic hyperplasia)      Coronary artery disease 18    anterior STEMI s/p NATY X2     Hyperlipidemia LDL goal <70      Hypertension      Past Surgical History:   Procedure Laterality Date     HEMORRHOIDECTOMY  Summer 2008     PROCTOPLASTY  Summer 2008     RECTAL SURGERY       Family History   Problem Relation Age of Onset     Skin Cancer No family hx of      Melanoma No family hx of          ROS   A complete ROS was otherwise negative except as noted in the HPI and the end of the " note.  There were no vitals taken for this visit.  Exam:   GENERAL APPEARANCE: Well developed, well nourished, alert, and in no apparent distress.  EYES: PERRL, EOMI, conjunctiva clear non-injected  HENT: Pale moderate nasal mucosal hypertrophy. No nasal polyps.  No facial tenderness.  MOUTH: Oral mucosa is moist, without any lesions, no tonsillar enlargement, no oropharyngeal exudate.  RESP: Good air flow throughout.  No crackles. No rhonchi. No wheezes.  CV: Normal S1, S2, regular rhythm, normal rate. No murmur.  No rub. No gallop. No LE edema.   MS: Extremities normal. No clubbing. No cyanosis.  SKIN: No rashes noted  NEURO: Speech normal, normal strength and tone, normal gait and stance  PSYCH: Normal mentation, orientation to person, place, and time.  Results:  43 percutaneous environmental allergen (and 2 controls) extracts placed by MA and read by MD.    Environmental skin test panel: Negative    Assessment and plan:   Allergy testing was negative for environmental allergens, chronic rhinorrhea is likely due to vasomotor rhinitis as he also notes the runny nose is worse in the winter and worse when outside during the winter.     First try applying Aquaphor to inner nose. If this is not helpful, try AYR nasal gel. If this is not helpful please call and will prescribe nasal ipratropium bromide.     It may also be helpful to keep a humidifier in the house, please be sure to clean at least once a a week.        Dulce Mckeon MD  Pediatric Resident, PGY-3    Physician Attestation   I, Collin Bolden MD, saw this patient with the resident and agree with the resident/fellow's findings and plan of care as documented in the note.          Collin Bolden MD  Date of Service (when I saw the patient): 12/09/19            Patient had 45 prick tests placed this visit.    Control Tests (2 tests)    Standard Adult Panel (43 tests)     Adult Standard Tray (12/09/19)         No Substance Readings (15  min) Evaluation   1 Negative Control- Saline -    2 Positive Control - Histamine 2     Dusts     3 Cockroach -    4 D. Farinae -    5 D. Pteronyssinus -     Danders     6 Cat -    7 Dog -     Molds     8 Alternaria -    9 Cladisporium -    10 Aspergillus -    11 Penicillium -    12 Helminthosporium -    13 Fusarium -    14 Phoma -     Tree Pollen     15 Reinaldo -    16 Birch -    17 Rutland -    18 Elm -    19 Maple / Oakfield -    20 Butler -    21 [place] -    22 Mountain [place] -    23 [place] -    24 [place] -    25 [place] Mix LY -    26 Solsberry -    27 Pinewood -    28 [place] -     Grass Pollen     29 Rebel -    30 [place] Fescue -    31 Orchard -    32 Canby -    33 Kentucky Blue Grass -    34 Red Top -     Weed Pollen     35 Kochia -    36 Lamb's Quarter -    37 Nettle -    38 Pigweed -    39 Short Ragweed -    40 Tall Ragweed -    41 Russian Thistle -    42 Doc [place] -    43 Mug wort -    44 English Plantain -    45 Cocklebur -      Conclusion:        Again, thank you for allowing me to participate in the care of your patient.        Sincerely,        Collin Bolden MD

## 2019-12-09 NOTE — PROGRESS NOTES
"Reason for Visit  Damian Tejada is a 59 year old male who is referred by Tawanna Mendez for allergy evaluation    Allergy HPI  Chronic rhinitis, post-nasal drip and throat clearing: History of rhinorrhea and post nasal drip. Has been seeing doctors for sinus issues throughout his life with intermittent medication use. Symptoms improve over the summer. Worse outside in the cold weather. Better when indoors. Coughs up mucus from back of throat. Has never taken a daily allergy medication. Has used nasal spray 5-6 years ago that was not helpful. No pets. No history of asthma or eczema. No headaches, cough, fevers, conjunctivitis, rash, or oral allergy symptoms.    9/26/19 Laryngoscopy: \"The scope was passed through the left nostril into the nasopharynx. There was edema of the turbinates and post-nasal mucous drainage. The base of tongue and tonsil tissues were visualized and found to be normal. The vocal cords and larynx were visualized and the true vocal cords were visualized and found to be normal.\"      The patient was seen and examined by Dulce Mckeon MD, MD   Current Outpatient Medications   Medication     aspirin (ASA) 81 MG tablet     atorvastatin (LIPITOR) 80 MG tablet     carvedilol (COREG) 6.25 MG tablet     escitalopram (LEXAPRO) 10 MG tablet     No current facility-administered medications for this visit.      No Known Allergies  Social History     Socioeconomic History     Marital status: Single     Spouse name: Not on file     Number of children: Not on file     Years of education: Not on file     Highest education level: Not on file   Occupational History     Not on file   Social Needs     Financial resource strain: Not on file     Food insecurity:     Worry: Not on file     Inability: Not on file     Transportation needs:     Medical: Not on file     Non-medical: Not on file   Tobacco Use     Smoking status: Never Smoker     Smokeless tobacco: Never Used   Substance and Sexual Activity " "    Alcohol use: No     Drug use: No     Sexual activity: Never   Lifestyle     Physical activity:     Days per week: Not on file     Minutes per session: Not on file     Stress: Not on file   Relationships     Social connections:     Talks on phone: Not on file     Gets together: Not on file     Attends Druze service: Not on file     Active member of club or organization: Not on file     Attends meetings of clubs or organizations: Not on file     Relationship status: Not on file     Intimate partner violence:     Fear of current or ex partner: Not on file     Emotionally abused: Not on file     Physically abused: Not on file     Forced sexual activity: Not on file   Other Topics Concern     Parent/sibling w/ CABG, MI or angioplasty before 65F 55M? Not Asked   Social History Narrative    Mr. Tejada lives alone. He has never been , has no children. Last sexually active . He does not smoke cigarettes or drink alcohol, and denies illicit drug use. He is from [facility], moved to the  25 years ago.         His mother   \"old age\" around age 70. Father is alive and healthy at 75, has 5 siblings who are all healthy.      Past Medical History:   Diagnosis Date     BPH (benign prostatic hyperplasia)      Coronary artery disease 18    anterior STEMI s/p NATY X2     Hyperlipidemia LDL goal <70      Hypertension      Past Surgical History:   Procedure Laterality Date     HEMORRHOIDECTOMY  Summer 2008     PROCTOPLASTY  Summer 2008     RECTAL SURGERY       Family History   Problem Relation Age of Onset     Skin Cancer No family hx of      Melanoma No family hx of          ROS   A complete ROS was otherwise negative except as noted in the HPI and the end of the note.  There were no vitals taken for this visit.  Exam:   GENERAL APPEARANCE: Well developed, well nourished, alert, and in no apparent distress.  EYES: PERRL, EOMI, conjunctiva clear non-injected  HENT: Pale moderate nasal mucosal hypertrophy. No " nasal polyps.  No facial tenderness.  MOUTH: Oral mucosa is moist, without any lesions, no tonsillar enlargement, no oropharyngeal exudate.  RESP: Good air flow throughout.  No crackles. No rhonchi. No wheezes.  CV: Normal S1, S2, regular rhythm, normal rate. No murmur.  No rub. No gallop. No LE edema.   MS: Extremities normal. No clubbing. No cyanosis.  SKIN: No rashes noted  NEURO: Speech normal, normal strength and tone, normal gait and stance  PSYCH: Normal mentation, orientation to person, place, and time.  Results:  43 percutaneous environmental allergen (and 2 controls) extracts placed by MA and read by MD.    Environmental skin test panel: Negative    Assessment and plan:   Allergy testing was negative for environmental allergens, chronic rhinorrhea is likely due to vasomotor rhinitis as he also notes the runny nose is worse in the winter and worse when outside during the winter.     First try applying Aquaphor to inner nose. If this is not helpful, try AYR nasal gel. If this is not helpful please call and will prescribe nasal ipratropium bromide.     It may also be helpful to keep a humidifier in the house, please be sure to clean at least once a a week.        Dulce Mckeon MD  Pediatric Resident, PGY-3    Physician Attestation   I, Collin Bolden MD, saw this patient with the resident and agree with the resident/fellow's findings and plan of care as documented in the note.          Collin Bolden MD  Date of Service (when I saw the patient): 12/09/19

## 2019-12-09 NOTE — PROGRESS NOTES
Patient had 45 prick tests placed this visit.    Control Tests (2 tests)    Standard Adult Panel (43 tests)     Adult Standard Tray (12/09/19)         No Substance Readings (15 min) Evaluation   1 Negative Control- Saline -    2 Positive Control - Histamine 2     Dusts     3 Cockroach -    4 D. Farinae -    5 D. Pteronyssinus -     Danders     6 Cat -    7 Dog -     Molds     8 Alternaria -    9 Cladisporium -    10 Aspergillus -    11 Penicillium -    12 Helminthosporium -    13 Fusarium -    14 Phoma -     Tree Pollen     15 Reinaldo -    16 Birch -    17 [place] -    18 Elm -    19 Maple / Hawkins -    20 Bon[place] -    21 Coolidge -    22 Mountain Mellette -    23 [place] -    24 [place] -    25 [place] Mix LY -    26 [employer] Base Mdl -    27 Benedict -    28 [place] -     Grass Pollen     29 Rebel -    30 Delia Fescue -    31 Orchard -    32 [place] -    33 [place] Blue Grass -    34 Red Top -     Weed Pollen     35 Kochia -    36 Lamb's Quarter -    37 Nettle -    38 Pigweed -    39 Short Ragweed -    40 Tall Ragweed -    41 Russian Thistle -    42 Doc [place] -    43 Mug wort -    44 English Plantain -    45 Cocklebur -      Conclusion:

## 2019-12-13 ENCOUNTER — OFFICE VISIT (OUTPATIENT)
Dept: OTOLARYNGOLOGY | Facility: CLINIC | Age: 59
End: 2019-12-13
Payer: COMMERCIAL

## 2019-12-13 VITALS
DIASTOLIC BLOOD PRESSURE: 75 MMHG | TEMPERATURE: 97.8 F | HEIGHT: 66 IN | OXYGEN SATURATION: 99 % | SYSTOLIC BLOOD PRESSURE: 153 MMHG | RESPIRATION RATE: 18 BRPM | BODY MASS INDEX: 31.18 KG/M2 | WEIGHT: 194 LBS | HEART RATE: 50 BPM

## 2019-12-13 DIAGNOSIS — J34.2 DEVIATED NASAL SEPTUM: Primary | ICD-10-CM

## 2019-12-13 DIAGNOSIS — J34.3 NASAL TURBINATE HYPERTROPHY: ICD-10-CM

## 2019-12-13 ASSESSMENT — MIFFLIN-ST. JEOR: SCORE: 1649.98

## 2019-12-13 ASSESSMENT — PAIN SCALES - GENERAL: PAINLEVEL: NO PAIN (0)

## 2019-12-13 NOTE — PROGRESS NOTES
The patient presents with a history of chronic rhinitis and post-nasal drainage and chronic throat clearing.  His allergy evaluation with Dr. Collin Lugo demonstrated:    Allergy testing was negative for environmental allergens, chronic rhinorrhea is likely due to vasomotor rhinitis as he also notes the runny nose is worse in the winter and worse when outside during the winter.    First try applying Aquaphor to inner nose. If this is not helpful, try AYR nasal gel. If this is not helpful please call and will prescribe nasal ipratropium bromide.    It may also be helpful to keep a humidifier in the house, please be sure to clean at least once a a week.    All other systems were reviewed and they are either negative or they are not directly pertinent to this Otolaryngology examination.      Past Medical History:    Past Medical History:   Diagnosis Date     BPH (benign prostatic hyperplasia)      Coronary artery disease 8/14/16    anterior STEMI s/p NATY X2     Hyperlipidemia LDL goal <70      Hypertension        Past Surgical History:    Past Surgical History:   Procedure Laterality Date     HEMORRHOIDECTOMY  Summer 2008     PROCTOPLASTY  Summer 2008     RECTAL SURGERY         Medications:      Current Outpatient Medications:      aspirin (ASA) 81 MG tablet, Take 1 tablet (81 mg) by mouth daily, Disp: 90 tablet, Rfl: 3     atorvastatin (LIPITOR) 80 MG tablet, Take 1 tablet (80 mg) by mouth daily, Disp: 90 tablet, Rfl: 3     carvedilol (COREG) 6.25 MG tablet, Take 1 tablet (6.25 mg) by mouth 2 times daily (with meals), Disp: 180 tablet, Rfl: 3     escitalopram (LEXAPRO) 10 MG tablet, Take 1 tablet (10 mg) by mouth daily, Disp: 90 tablet, Rfl: 3    Allergies:    Patient has no known allergies.    Physical Examination:    The patient is a well developed, well nourished male in no apparent distress.  He is normocepahlic, atraumatic with pupils equally round and reactive to light.    Oral Cavity Examination: Normal  Mucosa with no masses or lesions  Nasal Examination: Congested nasal turbinates and nasal mucosa with no masses or lesions, deviated septum to the left.   Ear Examination: Ear canals clear, tympanic membranes and middle ear spaces normal  Neurological Examination: Facial nerve function intact and symmetric  Integumentary Examination: No lesions on the skin of the head or neck    Assessment and Plan:    The patient presents with a history of chronic rhinitis and post-nasal drainage and chronic throat clearing. He was referred to Dr. Collin Lugo for allergy evaluation and his allergy testing was negative. He continues to have symptoms of nasal congestion primarily on the left and chronic non-purulent rhinitis. He will be referred to Dr. Kwame De Leon for possible surgical management.          CC: Dr. Tawanna Garcia.

## 2019-12-13 NOTE — PATIENT INSTRUCTIONS
1.  You were seen in the ENT Clinic today by Dr. Bain.  If you have any questions or concerns after your appointment, please call [phone]. Press option #1 for scheduling related needs. Press option #3 for Nurse advice.    2.  Plan is to return to clinic to see Dr. Kwame De Leon for further evaluation.      Harper Ortega LPN  [facility] Otolaryngology  [phone]    The patient presents with a history of chronic rhinitis and post-nasal drainage and chronic throat clearing. He was referred to Dr. Collin Lugo for allergy evaluation and his allergy testing was negative. He continues to have symptoms of nasal congestion primarily on the left and chronic non-purulent rhinitis. He will be referred to Dr. Kwame De Leon for possible surgical management.

## 2019-12-13 NOTE — TELEPHONE ENCOUNTER
FUTURE VISIT INFORMATION      FUTURE VISIT INFORMATION:    Date: 1/8/20    Time: 10:30 AM    Location: [facility]  REFERRAL INFORMATION:    Referring provider:  Dr. Bain    Referring providers clinic:  [facility]    Reason for visit/diagnosis: possible surgery for Turbinate Hypertrophy and septal deviation    RECORDS REQUESTED FROM:       Clinic name Comments Records Status Imaging Status   [facility] 12/13/19 - ENT OV with Dr. Bain  12/9/19 - ALLERGY OV with Dr. Lugo  11/3/15 - ENT OV with Dr. Natali Arellano    [facility] - Imaging 4/20/17 - MRI Brain W/WO [facility] PACs

## 2019-12-13 NOTE — LETTER
12/13/2019       RE: Damian Tejada  [address]  [facility] [address]     Dear Colleague,    Thank you for referring your patient, Damian Tejada, to the [facility] EAR NOSE AND THROAT at [facility]. Please see a copy of my visit note below.    The patient presents with a history of chronic rhinitis and post-nasal drainage and chronic throat clearing.  His allergy evaluation with Dr. Collin Lugo demonstrated:    Allergy testing was negative for environmental allergens, chronic rhinorrhea is likely due to vasomotor rhinitis as he also notes the runny nose is worse in the winter and worse when outside during the winter.    First try applying Aquaphor to inner nose. If this is not helpful, try AYR nasal gel. If this is not helpful please call and will prescribe nasal ipratropium bromide.    It may also be helpful to keep a humidifier in the house, please be sure to clean at least once a a week.    All other systems were reviewed and they are either negative or they are not directly pertinent to this Otolaryngology examination.      Past Medical History:    Past Medical History:   Diagnosis Date     BPH (benign prostatic hyperplasia)      Coronary artery disease 8/14/16    anterior STEMI s/p NATY X2     Hyperlipidemia LDL goal <70      Hypertension        Past Surgical History:    Past Surgical History:   Procedure Laterality Date     HEMORRHOIDECTOMY  Summer 2008     PROCTOPLASTY  Summer 2008     RECTAL SURGERY         Medications:      Current Outpatient Medications:      aspirin (ASA) 81 MG tablet, Take 1 tablet (81 mg) by mouth daily, Disp: 90 tablet, Rfl: 3     atorvastatin (LIPITOR) 80 MG tablet, Take 1 tablet (80 mg) by mouth daily, Disp: 90 tablet, Rfl: 3     carvedilol (COREG) 6.25 MG tablet, Take 1 tablet (6.25 mg) by mouth 2 times daily (with meals), Disp: 180 tablet, Rfl: 3     escitalopram (LEXAPRO) 10 MG tablet, Take 1 tablet (10 mg) by mouth daily, Disp:  90 tablet, Rfl: 3    Allergies:    Patient has no known allergies.    Physical Examination:    The patient is a well developed, well nourished male in no apparent distress.  He is normocepahlic, atraumatic with pupils equally round and reactive to light.    Oral Cavity Examination: Normal Mucosa with no masses or lesions  Nasal Examination: Congested nasal turbinates and nasal mucosa with no masses or lesions, deviated septum to the left.   Ear Examination: Ear canals clear, tympanic membranes and middle ear spaces normal  Neurological Examination: Facial nerve function intact and symmetric  Integumentary Examination: No lesions on the skin of the head or neck    Assessment and Plan:    The patient presents with a history of chronic rhinitis and post-nasal drainage and chronic throat clearing. He was referred to Dr. Collin Lugo for allergy evaluation and his allergy testing was negative. He continues to have symptoms of nasal congestion primarily on the left and chronic non-purulent rhinitis. He will be referred to Dr. Kwame De Leon for possible surgical management.          CC: Dr. Tawanna Garcia.       Again, thank you for allowing me to participate in the care of your patient.      Sincerely,    Kwame Bain MD

## 2019-12-13 NOTE — NURSING NOTE
"Chief Complaint   Patient presents with     RECHECK     allergic rhinitis -follow up after allergist      Blood pressure (!) 153/75, pulse 50, temperature 97.8  F (36.6  C), resp. rate 18, height 1.68 m (5' 6.14\"), weight 88 kg (194 lb), SpO2 99 %.    Constantin Mora LPN    "

## 2020-01-08 ENCOUNTER — PRE VISIT (OUTPATIENT)
Dept: OTOLARYNGOLOGY | Facility: CLINIC | Age: 60
End: 2020-01-08

## 2020-01-08 ENCOUNTER — OFFICE VISIT (OUTPATIENT)
Dept: OTOLARYNGOLOGY | Facility: CLINIC | Age: 60
End: 2020-01-08
Payer: COMMERCIAL

## 2020-01-08 VITALS — BODY MASS INDEX: 31.02 KG/M2 | WEIGHT: 193 LBS | HEIGHT: 66 IN

## 2020-01-08 DIAGNOSIS — J32.2 CHRONIC ETHMOIDAL SINUSITIS: ICD-10-CM

## 2020-01-08 DIAGNOSIS — J45.40 MODERATE PERSISTENT REACTIVE AIRWAY DISEASE WITHOUT COMPLICATION: ICD-10-CM

## 2020-01-08 DIAGNOSIS — J31.0 CHRONIC RHINITIS: ICD-10-CM

## 2020-01-08 DIAGNOSIS — J34.3 NASAL TURBINATE HYPERTROPHY: Primary | ICD-10-CM

## 2020-01-08 RX ORDER — BUDESONIDE 0.5 MG/2ML
INHALANT ORAL
Qty: 60 AMPULE | Refills: 11 | Status: SHIPPED | OUTPATIENT
Start: 2020-01-08 | End: 2021-06-01

## 2020-01-08 RX ORDER — METHYLPREDNISOLONE 4 MG
TABLET, DOSE PACK ORAL
Qty: 21 TABLET | Refills: 0 | Status: SHIPPED | OUTPATIENT
Start: 2020-01-08 | End: 2021-06-01

## 2020-01-08 ASSESSMENT — PAIN SCALES - GENERAL: PAINLEVEL: NO PAIN (0)

## 2020-01-08 ASSESSMENT — MIFFLIN-ST. JEOR: SCORE: 1643.19

## 2020-01-08 NOTE — LETTER
1/8/2020       RE: Damian Tejada  [address]  [facility] [address]     Dear Colleague,    Thank you for referring your patient, Damian Tejada, to the [facility] EAR NOSE AND THROAT at [facility]. Please see a copy of my visit note below.                       [facility]                   New Patient Visit      Encounter date: January 8, 2020    Referring Provider:   Kwame Bain MD  [address]  [place] [address]    Chief Complaint: nasal congestion, post-nasal drip    History of Present Illness: Damian Tejada is a 59 year old man who has a long history of moderate-severe post-nasal drip and nasal congestion. Nasal congestion is worse on the left. He states it is getting worse over the past several years. He has associated facial pain/pressure that is mild. This has not responded to topical emollient therapy. He was tested for aeroallergen sensitivity by Dr. Lugo and was found to have no sig allergies. The patient admits to frequent throat clearing that is quite bothersome. He is not using saline irrigations or flonase. There is no prior history of sinonasal surgery in the past. Denies frequent treatment with antibiotics/steroids for recurrent sinus infections.       Review of systems: A 14-point review of systems has been conducted and was negative for any notable symptoms, except as dictated in the history of present illness.     Past Medical History:   Diagnosis Date     BPH (benign prostatic hyperplasia)      Coronary artery disease 8/14/16    anterior STEMI s/p NATY X2     Hyperlipidemia LDL goal <70      Hypertension         Past Surgical History:   Procedure Laterality Date     HEMORRHOIDECTOMY  Summer 2008     PROCTOPLASTY  Summer 2008     RECTAL SURGERY          Family History   Problem Relation Age of Onset     Skin Cancer No family hx of      Melanoma No family hx of         Social History     Socioeconomic History  "    Marital status: Single     Spouse name: None     Number of children: None     Years of education: None     Highest education level: None   Occupational History     None   Social Needs     Financial resource strain: None     Food insecurity:     Worry: None     Inability: None     Transportation needs:     Medical: None     Non-medical: None   Tobacco Use     Smoking status: Never Smoker     Smokeless tobacco: Never Used   Substance and Sexual Activity     Alcohol use: No     Drug use: No     Sexual activity: Never   Lifestyle     Physical activity:     Days per week: None     Minutes per session: None     Stress: None   Relationships     Social connections:     Talks on phone: None     Gets together: None     Attends Congregational service: None     Active member of club or organization: None     Attends meetings of clubs or organizations: None     Relationship status: None     Intimate partner violence:     Fear of current or ex partner: None     Emotionally abused: None     Physically abused: None     Forced sexual activity: None   Other Topics Concern     Parent/sibling w/ CABG, MI or angioplasty before 65F 55M? Not Asked   Social History Narrative    Mr. Tejada lives alone. He has never been , has no children. Last sexually active . He does not smoke cigarettes or drink alcohol, and denies illicit drug use. He is from [facility], moved to the  25 years ago.         His mother  2/2 \"old age\" around age 70. Father is alive and healthy at 75, has 5 siblings who are all healthy.         Physical Exam:  Vital signs: Ht 1.676 m (5' 6\")   Wt 87.5 kg (193 lb)   BMI 31.15 kg/m      General Appearance: No acute distress, appropriate demeanor, conversant  Eyes: moist conjunctivae; EOMI; pupils symmetric; visual acuity grossly intact; no proptosis  Head: normocephalic; overall symmetric appearance without deformity  Face: overall symmetric without deformity; HB I-VI  Ears: Normal appearance of external ear; " external meatus normal in appearance; TMs intact without perforation bilaterally;   Nose: No external deformity; septum deviated bilaterally causing greater than 70% obstruction; inferior turbinates without significant hypertrophy  Oral Cavity/oropharynx: Normal appearance of mucosa; tongue midline; no mass or lesions; oropharynx without obvious mucosal abnormality  Neck: no palpable lymphadenopathy; thyroid without palpable nodules  Lungs: symmetric chest rise; no wheezing  CV: Good distal perfusion; normal hear rate  Extremities: No deformity  Neurologic Exam: Cranial nerves II-XII are grossly intact; no focal deficit      Procedure Note  Procedure performed: Rigid nasal endoscopy  Indication: To evaluate for sinonasal pathology not visualized on routine anterior rhinoscopy  Anesthesia: 4% topical lidocaine with 0.05% oxymetazoline  Description of procedure: A 30 degree, 3 mm rigid endoscope was inserted into bilateral nasal cavities and the nasal valves, nasal cavity, middle meatus, sphenoethmoid recess, and nasopharynx were thoroughly evaluated for evidence of obstruction, edema, purulence, polyps and/or mass/lesion.     [place] Endoscopic Scoring System  Endoscopic observation Right Left   Polyps in middle meatus (0 = absent, 1 = restricted to middle meatus, 2 = Beyond middle meatus) 0 0   Discharge (0 = absent, 1 = thin and clear, 2 = thick, purulent) 1 1   Edema (0 = absent, 1 = mild-moderate, 2 = moderate-severe) 1 1   Crusting (0 = absent, 1 = mild-moderate, 2 = moderate-severe) 0 0   Scarring (0= absent, 1 = mild-moderate, 2 = moderate-severe) 0 0   Total 2 2     Findings  RT: inferior meatal window; posterior hypertrophy of IT; edema of MT; SER clear      LT: inferior meatal window; posterior IT hypertrophy; edema of MM; mild clear drainage from MM; leftward septal deviation causing >70% obstruction; SER clear  The patient tolerated the procedure well without complication.     Laboratory  Review:  n/a    Imaging Review:  Reviewed MRI from 2017 which demonstrates scattered ethmoid inflammation    Pathology Review:  n/a    Assessment/Medical Decision Making/Plan:  Damian has nasal obstruction and post-nasal drip. His nasal obstruction is worse on the left. 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 I am recommending BID budesonide rinses, as he has some scattered ethmoid inflammation on MRI, which I believe may be contributing to worsening inflammation and drainage. I will also start a medrol dose gregory as well. Will have him follow up in 4-6 weeks with CT of sinuses. If neg, will consider atrovent (unclear if he ever tried) and possibly cryotherapy.     Kwame De Leon MD    [facility]  Rhinology  Endoscopic Skull Base Surgery  [facility]  Department of Otolaryngology - Head & Neck Surgery

## 2020-01-08 NOTE — NURSING NOTE
"Chief Complaint   Patient presents with     Consult     Turbinate hypertrophy, septal deviation     Height 1.676 m (5' 6\"), weight 87.5 kg (193 lb).    Donna Cagle, EMT    "

## 2020-01-08 NOTE — PATIENT INSTRUCTIONS
1. Medrol dose pack at pharmacy.  2. Follow budesonide irrigation instructions to be performed twice daily.  3. Follow up in 4-6 weeks with Dr. De Leon. CT sinus prior to appointment.     Cleaning of the Nasal or Sinus Cavity    Budesonide (Pulmicort)  -Budesonide is an anti-inflammatory steroid medication used to decrease nasal and sinus inflammation.   -It is dispensed in liquid form in a 2 mL respules (small plastic pouch).   -Respules should be refrigerated, administer medication at room temperature.   -Although it is manufactured for use with a nebulizer, we intend for you to use it with the NeilMed Sinus Rinse bottle (preferred) or a Neti pot.      Please follow all steps. Nasal irrigation (cleaning) should be done 2 times/day.    Preparation:  1.  Wash your hands  2.  We suggest that you buy the NeilMed Sinus Rinse Kit  3.  Use distilled water or tap water that has been boiled and brought to room temperature.  4.  Fill the irrigation bottle with room temperature water (distilled or boiled tap water) and add mixture (pre made packet or homemade recipe).        If you wish to make a homemade recipe:        Mix 1/4 teaspoon salt with 1/4 teaspoon baking soda in each bottle.  5. Add one respule of budesonide and mix well to ensure that everything is dissolved.  Cleansing Irrigation/Sinus Rinse:    1.  Lean forward over a sink and insert the rinse bottle applicator into the right side of your nose.    2.  Tilt head down and to the left side.  With your mouth open (breathing through your mouth), gently direct the water around the inside of your nose until clear fluid starts to drain from the opposite nostril.  This is called flushing or irrigation.    3.  When you have used 1/4 to 1/2 or the solution, switch to the left nostril.    4.  To irrigate the left nostril, tilt your head down and to the right side.  With your mouth open (breathing through your mouth),  gently direct the water around the inside of your nose  until clear fluid starts to drain from the opposite nostril.     Cleaning the Equipment:  1.  Throw away any leftover solution  2.  Clean the rinse bottle and cap with clear water. Air dry.      Call the ENT Clinic if you have any questions or concerns at [phone]

## 2020-01-08 NOTE — PROGRESS NOTES
[facility]                   New Patient Visit      Encounter date: January 8, 2020    Referring Provider:   Kwame Bain MD  [address] [facility] [address] [facility]  [place] [address]    Chief Complaint: nasal congestion, post-nasal drip    History of Present Illness: Damian Tejada is a 59 year old man who has a long history of moderate-severe post-nasal drip and nasal congestion. Nasal congestion is worse on the left. He states it is getting worse over the past several years. He has associated facial pain/pressure that is mild. This has not responded to topical emollient therapy. He was tested for aeroallergen sensitivity by Dr. Lugo and was found to have no sig allergies. The patient admits to frequent throat clearing that is quite bothersome. He is not using saline irrigations or flonase. There is no prior history of sinonasal surgery in the past. Denies frequent treatment with antibiotics/steroids for recurrent sinus infections.       Review of systems: A 14-point review of systems has been conducted and was negative for any notable symptoms, except as dictated in the history of present illness.     Past Medical History:   Diagnosis Date     BPH (benign prostatic hyperplasia)      Coronary artery disease 8/14/16    anterior STEMI s/p NATY X2     Hyperlipidemia LDL goal <70      Hypertension         Past Surgical History:   Procedure Laterality Date     HEMORRHOIDECTOMY  Summer 2008     PROCTOPLASTY  Summer 2008     RECTAL SURGERY          Family History   Problem Relation Age of Onset     Skin Cancer No family hx of      Melanoma No family hx of         Social History     Socioeconomic History     Marital status: Single     Spouse name: None     Number of children: None     Years of education: None     Highest education level: None   Occupational History     None   Social Needs     Financial resource strain: None     Food insecurity:     Worry: None     Inability: None      "Transportation needs:     Medical: None     Non-medical: None   Tobacco Use     Smoking status: Never Smoker     Smokeless tobacco: Never Used   Substance and Sexual Activity     Alcohol use: No     Drug use: No     Sexual activity: Never   Lifestyle     Physical activity:     Days per week: None     Minutes per session: None     Stress: None   Relationships     Social connections:     Talks on phone: None     Gets together: None     Attends Zoroastrianism service: None     Active member of club or organization: None     Attends meetings of clubs or organizations: None     Relationship status: None     Intimate partner violence:     Fear of current or ex partner: None     Emotionally abused: None     Physically abused: None     Forced sexual activity: None   Other Topics Concern     Parent/sibling w/ CABG, MI or angioplasty before 65F 55M? Not Asked   Social History Narrative    Mr. Tejada lives alone. He has never been , has no children. Last sexually active . He does not smoke cigarettes or drink alcohol, and denies illicit drug use. He is from [facility], moved to the  25 years ago.         His mother  2/ \"old age\" around age 70. Father is alive and healthy at 75, has 5 siblings who are all healthy.         Physical Exam:  Vital signs: Ht 1.676 m (5' 6\")   Wt 87.5 kg (193 lb)   BMI 31.15 kg/m     General Appearance: No acute distress, appropriate demeanor, conversant  Eyes: moist conjunctivae; EOMI; pupils symmetric; visual acuity grossly intact; no proptosis  Head: normocephalic; overall symmetric appearance without deformity  Face: overall symmetric without deformity; HB I-VI  Ears: Normal appearance of external ear; external meatus normal in appearance; TMs intact without perforation bilaterally;   Nose: No external deformity; septum deviated bilaterally causing greater than 70% obstruction; inferior turbinates without significant hypertrophy  Oral Cavity/oropharynx: Normal appearance of mucosa; " tongue midline; no mass or lesions; oropharynx without obvious mucosal abnormality  Neck: no palpable lymphadenopathy; thyroid without palpable nodules  Lungs: symmetric chest rise; no wheezing  CV: Good distal perfusion; normal hear rate  Extremities: No deformity  Neurologic Exam: Cranial nerves II-XII are grossly intact; no focal deficit      Procedure Note  Procedure performed: Rigid nasal endoscopy  Indication: To evaluate for sinonasal pathology not visualized on routine anterior rhinoscopy  Anesthesia: 4% topical lidocaine with 0.05% oxymetazoline  Description of procedure: A 30 degree, 3 mm rigid endoscope was inserted into bilateral nasal cavities and the nasal valves, nasal cavity, middle meatus, sphenoethmoid recess, and nasopharynx were thoroughly evaluated for evidence of obstruction, edema, purulence, polyps and/or mass/lesion.     [facility] Endoscopic Scoring System  Endoscopic observation Right Left   Polyps in middle meatus (0 = absent, 1 = restricted to middle meatus, 2 = Beyond middle meatus) 0 0   Discharge (0 = absent, 1 = thin and clear, 2 = thick, purulent) 1 1   Edema (0 = absent, 1 = mild-moderate, 2 = moderate-severe) 1 1   Crusting (0 = absent, 1 = mild-moderate, 2 = moderate-severe) 0 0   Scarring (0= absent, 1 = mild-moderate, 2 = moderate-severe) 0 0   Total 2 2     Findings  RT: inferior meatal window; posterior hypertrophy of IT; edema of MT; SER clear      LT: inferior meatal window; posterior IT hypertrophy; edema of MM; mild clear drainage from MM; leftward septal deviation causing >70% obstruction; SER clear  The patient tolerated the procedure well without complication.     Laboratory Review:  n/a    Imaging Review:  Reviewed MRI from 2017 which demonstrates scattered ethmoid inflammation    Pathology Review:  n/a    Assessment/Medical Decision Making/Plan:  Damian has nasal obstruction and post-nasal drip. His nasal obstruction is worse on the left. Has unusual sinoansal  anatomy with inferior meatal windows and no prior surgery. I am recommending BID budesonide rinses, as he has some scattered ethmoid inflammation on MRI, which I believe may be contributing to worsening inflammation and drainage. I will also start a medrol dose gregory as well. Will have him follow up in 4-6 weeks with CT of sinuses. If neg, will consider atrovent (unclear if he ever tried) and possibly cryotherapy.     Kwame De Leon MD    [facility]  Rhinology  Endoscopic Skull Base Surgery  [facility]  Department of Otolaryngology - Head & Neck Surgery

## 2020-02-04 DIAGNOSIS — J34.3 NASAL TURBINATE HYPERTROPHY: Primary | ICD-10-CM

## 2020-02-27 DIAGNOSIS — J34.3 NASAL TURBINATE HYPERTROPHY: Primary | ICD-10-CM

## 2020-02-28 ENCOUNTER — ANCILLARY PROCEDURE (OUTPATIENT)
Dept: CT IMAGING | Facility: CLINIC | Age: 60
End: 2020-02-28
Attending: OTOLARYNGOLOGY
Payer: COMMERCIAL

## 2020-02-28 ENCOUNTER — OFFICE VISIT (OUTPATIENT)
Dept: OTOLARYNGOLOGY | Facility: CLINIC | Age: 60
End: 2020-02-28
Payer: COMMERCIAL

## 2020-02-28 VITALS — WEIGHT: 193 LBS | BODY MASS INDEX: 31.02 KG/M2 | HEIGHT: 66 IN

## 2020-02-28 DIAGNOSIS — J34.89 NASAL OBSTRUCTION: ICD-10-CM

## 2020-02-28 DIAGNOSIS — J34.3 NASAL TURBINATE HYPERTROPHY: ICD-10-CM

## 2020-02-28 DIAGNOSIS — J34.2 DEVIATED NASAL SEPTUM: ICD-10-CM

## 2020-02-28 DIAGNOSIS — J45.40 MODERATE PERSISTENT REACTIVE AIRWAY DISEASE WITHOUT COMPLICATION: ICD-10-CM

## 2020-02-28 DIAGNOSIS — J32.2 CHRONIC ETHMOIDAL SINUSITIS: Primary | ICD-10-CM

## 2020-02-28 ASSESSMENT — PAIN SCALES - GENERAL: PAINLEVEL: NO PAIN (0)

## 2020-02-28 ASSESSMENT — MIFFLIN-ST. JEOR: SCORE: 1638.19

## 2020-02-28 NOTE — LETTER
"2/28/2020       RE: Damian Tejada  [address]  [place] [place] [facility] [address]     Dear Colleague,    Thank you for referring your patient, Damian Tejada, to the [facility] EAR [facility] THRO[facility] at [facility]. Please see a copy of my visit note below.      [facility]  Return Visit      Encounter date: February 28, 2020    Chief Complaint: chronic sinusitis    ID: Damian Tejada is a 59 year old male with a long history of moderate-severe post-nasal drip and nasal congestion. At last visit on 1/8, we started him on BID budesonide rinses and medrol dose pack for ethmoid sinusitis.    Interval History: Damian is doing well and feels his nasal/sinus symptoms have improved since last visit. He is coughing much less. The drainage is the only bothersome symptom at this time, but this is improved significantly with budesonide rinses as well.    Review of systems: A 14-point review of systems has been conducted and is negative for any notable symptoms, except as dictated in the history of present illness.     Physical Exam:  Vital signs: Ht 1.676 m (5' 6\")   Wt 87.5 kg (193 lb)   BMI 31.15 kg/m      General Appearance: No acute distress, appropriate demeanor, conversant  Eyes: moist conjunctivae; EOMI; pupils symmetric; visual acuity grossly intact; no proptosis  Head: normocephalic; overall symmetric appearance without deformity  Face: overall symmetric without deformity; HB I/VI  Nose: No external deformity; septum deviated to left causing greater than 70% obstruction; inferior turbinates without significant hypertrophy  Neurologic Exam: Cranial nerves II-XII are grossly intact; no focal deficit    Laboratory Review:  n/a    Imaging Review:  CT sinus feb 28/2020 reviewed: mild-mod mucosal thickening of bilat ethmoid, max sinuses; there is evidence of inferior meatal windows bilat; LT septal deviation    Pathology Review:  n/a    Assessment/Medical Decision " Making:  Damian Tejada is a 59 year old male with a long history of moderate-severe post-nasal drip and nasal congestion who presents for follow-up. We reviewed his imaging today. Discussed that given he feels significant improvement in symptoms on budesonide rinses, I would advise continuing these for now and if his symptoms get worse again, we could consider surgery at that time.  We had a limited discussion of ESS today.    Plan:  1. Continue BID budesonide rinses  2. Follow-up in 4 months    Kwame De Leon MD    [facility]  Rhinology, Endoscopic Skull Base Surgery  [facility]  Department of Otolaryngology - Head & Neck Surgery    Additional portions of the patient's have been reviewed below.   ~~~~~~~~~~~~~~~~~~~~~~~~~~~~~~~~~~~~~~~~~~~~~~~~~~~~~~~~~~~~~~~~~~~~~~~~~~~~~~~~~~~~~~~~~~~~~~~~~~~~~~~~~~~~~~~~~~~~~~~~~~~~~~~~~~~~~~~    Past Medical History:   Diagnosis Date     BPH (benign prostatic hyperplasia)      Coronary artery disease 8/14/16    anterior STEMI s/p NATY X2     Hyperlipidemia LDL goal <70      Hypertension         Past Surgical History:   Procedure Laterality Date     HEMORRHOIDECTOMY  Summer 2008     PROCTOPLASTY  Summer 2008     RECTAL SURGERY          Family History   Problem Relation Age of Onset     Skin Cancer No family hx of      Melanoma No family hx of         Social History     Socioeconomic History     Marital status: Single     Spouse name: None     Number of children: None     Years of education: None     Highest education level: None   Occupational History     None   Social Needs     Financial resource strain: None     Food insecurity:     Worry: None     Inability: None     Transportation needs:     Medical: None     Non-medical: None   Tobacco Use     Smoking status: Never Smoker     Smokeless tobacco: Never Used   Substance and Sexual Activity     Alcohol use: No     Drug use: No     Sexual activity: Never   Lifestyle      "Physical activity:     Days per week: None     Minutes per session: None     Stress: None   Relationships     Social connections:     Talks on phone: None     Gets together: None     Attends Buddhist service: None     Active member of club or organization: None     Attends meetings of clubs or organizations: None     Relationship status: None     Intimate partner violence:     Fear of current or ex partner: None     Emotionally abused: None     Physically abused: None     Forced sexual activity: None   Other Topics Concern     Parent/sibling w/ CABG, MI or angioplasty before 65F 55M? Not Asked   Social History Narrative    Mr. Tejada lives alone. He has never been , has no children. Last sexually active . He does not smoke cigarettes or drink alcohol, and denies illicit drug use. [place], [place] to the  [place] years ago.         His mother  2/ \"old age\" around age 70. Father is alive and healthy at 75, has 5 siblings who are all healthy.         Scribe Disclosure:  I, Norma Clements, am serving as a scribe to document services personally performed by Kwame De Leon MD at this visit, based upon the provider's statements to me. All documentation has been reviewed by the aforementioned provider prior to being entered into the official medical record.     Portions of this medical record were completed by a scribe. UPON MY REVIEW AND AUTHENTICATION BY ELECTRONIC SIGNATURE, this confirms (a) I performed the applicable clinical services, and (b) the record is accurate.       Again, thank you for allowing me to participate in the care of your patient.      Sincerely,    Kwame De Leon MD      "

## 2020-02-28 NOTE — PROGRESS NOTES
"                   [facility]                       Return Visit      Encounter date: February 28, 2020    Chief Complaint: chronic sinusitis    ID: Damian Tejada is a 59 year old male with a long history of moderate-severe post-nasal drip and nasal congestion. At last visit on 1/8, we started him on BID budesonide rinses and medrol dose pack for ethmoid sinusitis.    Interval History: Damian is doing well and feels his nasal/sinus symptoms have improved since last visit. He is coughing much less. The drainage is the only bothersome symptom at this time, but this is improved significantly with budesonide rinses as well.    Review of systems: A 14-point review of systems has been conducted and is negative for any notable symptoms, except as dictated in the history of present illness.     Physical Exam:  Vital signs: Ht 1.676 m (5' 6\")   Wt 87.5 kg (193 lb)   BMI 31.15 kg/m     General Appearance: No acute distress, appropriate demeanor, conversant  Eyes: moist conjunctivae; EOMI; pupils symmetric; visual acuity grossly intact; no proptosis  Head: normocephalic; overall symmetric appearance without deformity  Face: overall symmetric without deformity; HB I/VI  Nose: No external deformity; septum deviated to left causing greater than 70% obstruction; inferior turbinates without significant hypertrophy  Neurologic Exam: Cranial nerves II-XII are grossly intact; no focal deficit    Laboratory Review:  n/a    Imaging Review:  CT sinus feb 28/2020 reviewed: mild-mod mucosal thickening of bilat ethmoid, max sinuses; there is evidence of inferior meatal windows bilat; LT septal deviation    Pathology Review:  n/a    Assessment/Medical Decision Making:  Damian Tejada is a 59 year old male with a long history of moderate-severe post-nasal drip and nasal congestion who presents for follow-up. We reviewed his imaging today. Discussed that given he feels significant improvement in symptoms on " budesonide rinses, I would advise continuing these for now and if his symptoms get worse again, we could consider surgery at that time.  We had a limited discussion of ESS today.    Plan:  1. Continue BID budesonide rinses  2. Follow-up in 4 months    Kwame De Leon MD    [facility]  Rhinology, Endoscopic Skull Base Surgery  [facility]  Department of Otolaryngology - Head & Neck Surgery    Additional portions of the patient's have been reviewed below.   ~~~~~~~~~~~~~~~~~~~~~~~~~~~~~~~~~~~~~~~~~~~~~~~~~~~~~~~~~~~~~~~~~~~~~~~~~~~~~~~~~~~~~~~~~~~~~~~~~~~~~~~~~~~~~~~~~~~~~~~~~~~~~~~~~~~~~~~    Past Medical History:   Diagnosis Date     BPH (benign prostatic hyperplasia)      Coronary artery disease 8/14/16    anterior STEMI s/p NATY X2     Hyperlipidemia LDL goal <70      Hypertension         Past Surgical History:   Procedure Laterality Date     HEMORRHOIDECTOMY  Summer 2008     PROCTOPLASTY  Summer 2008     RECTAL SURGERY          Family History   Problem Relation Age of Onset     Skin Cancer No family hx of      Melanoma No family hx of         Social History     Socioeconomic History     Marital status: Single     Spouse name: None     Number of children: None     Years of education: None     Highest education level: None   Occupational History     None   Social Needs     Financial resource strain: None     Food insecurity:     Worry: None     Inability: None     Transportation needs:     Medical: None     Non-medical: None   Tobacco Use     Smoking status: Never Smoker     Smokeless tobacco: Never Used   Substance and Sexual Activity     Alcohol use: No     Drug use: No     Sexual activity: Never   Lifestyle     Physical activity:     Days per week: None     Minutes per session: None     Stress: None   Relationships     Social connections:     Talks on phone: None     Gets together: None     Attends Yarsanism service: None     Active member of club or organization:  "None     Attends meetings of clubs or organizations: None     Relationship status: None     Intimate partner violence:     Fear of current or ex partner: None     Emotionally abused: None     Physically abused: None     Forced sexual activity: None   Other Topics Concern     Parent/sibling w/ CABG, MI or angioplasty before 65F 55M? Not Asked   Social History Narrative    Mr. Tejada lives alone. He has never been , has no children. Last sexually active . He does not smoke cigarettes or drink alcohol, and denies illicit drug use. He is from [facility], moved to the  25 years ago.         His mother  2/ \"old age\" around age 70. Father is alive and healthy at 75, has 5 siblings who are all healthy.         Scribe Disclosure:  I, Norma Clements, am serving as a scribe to document services personally performed by Kwame De Leon MD at this visit, based upon the provider's statements to me. All documentation has been reviewed by the aforementioned provider prior to being entered into the official medical record.     Portions of this medical record were completed by a scribe. UPON MY REVIEW AND AUTHENTICATION BY ELECTRONIC SIGNATURE, this confirms (a) I performed the applicable clinical services, and (b) the record is accurate.     "

## 2020-02-28 NOTE — NURSING NOTE
"Chief Complaint   Patient presents with     RECHECK     6 week follow-up         Height 1.676 m (5' 6\"), weight 87.5 kg (193 lb).    Donna Cagle, EMT    "

## 2020-04-08 DIAGNOSIS — I25.10 CORONARY ARTERY DISEASE INVOLVING NATIVE CORONARY ARTERY OF NATIVE HEART WITHOUT ANGINA PECTORIS: ICD-10-CM

## 2020-04-10 RX ORDER — ATORVASTATIN CALCIUM 80 MG/1
80 TABLET, FILM COATED ORAL DAILY
Qty: 90 TABLET | Refills: 0 | Status: SHIPPED | OUTPATIENT
Start: 2020-04-10 | End: 2020-07-14

## 2020-07-13 DIAGNOSIS — I25.10 CORONARY ARTERY DISEASE INVOLVING NATIVE CORONARY ARTERY OF NATIVE HEART WITHOUT ANGINA PECTORIS: ICD-10-CM

## 2020-07-14 RX ORDER — ATORVASTATIN CALCIUM 80 MG/1
80 TABLET, FILM COATED ORAL DAILY
Qty: 30 TABLET | Refills: 0 | Status: SHIPPED | OUTPATIENT
Start: 2020-07-14 | End: 2021-06-01

## 2020-07-14 NOTE — TELEPHONE ENCOUNTER
atorvastatin (LIPITOR) 80 MG tablet  Last Written Prescription Date:  4/10/20  Last Fill Quantity: 90,   # refills: 0  Last Office Visit :1/29/19  Future Office visit:  None    Scheduling has been notified to contact the pt for appointment.    Routing refill request to provider for review/approval because:  LDL

## 2021-01-07 ENCOUNTER — TRANSFERRED RECORDS (OUTPATIENT)
Dept: HEALTH INFORMATION MANAGEMENT | Facility: CLINIC | Age: 61
End: 2021-01-07

## 2021-03-28 ENCOUNTER — HEALTH MAINTENANCE LETTER (OUTPATIENT)
Age: 61
End: 2021-03-28

## 2021-04-05 ENCOUNTER — IMMUNIZATION (OUTPATIENT)
Dept: NURSING | Facility: CLINIC | Age: 61
End: 2021-04-05
Payer: COMMERCIAL

## 2021-04-05 PROCEDURE — 91300 PR COVID VAC PFIZER DIL RECON 30 MCG/0.3 ML IM: CPT

## 2021-04-05 PROCEDURE — 0001A PR COVID VAC PFIZER DIL RECON 30 MCG/0.3 ML IM: CPT

## 2021-04-26 ENCOUNTER — IMMUNIZATION (OUTPATIENT)
Dept: NURSING | Facility: CLINIC | Age: 61
End: 2021-04-26
Attending: INTERNAL MEDICINE
Payer: COMMERCIAL

## 2021-04-26 PROCEDURE — 91300 PR COVID VAC PFIZER DIL RECON 30 MCG/0.3 ML IM: CPT

## 2021-04-26 PROCEDURE — 0002A PR COVID VAC PFIZER DIL RECON 30 MCG/0.3 ML IM: CPT

## 2021-05-31 NOTE — PROGRESS NOTES
"                              [facility]  -----------------------------------------------------------------  History of Present Illness   Damian Tejada is a 61 year old male with a history of CAD (2016 PCI s/p 2 NATY), HTN, psoriasis. He is here for \"Left wrist pain- it propagated or caused my whole body to be sore and sluggishness, particularly my joints from head to toe.\"    No previous history of joint pain according to him but then Emeterio 10, 2021 had new onset with significant swelling and soreness of L wrist upon awakening which seemed to have occurred overnight. Saw an orthopedic surgeon shortly thereafter who wondered about gout or pseudogout. They got XR at that time and he states there were not acute findings. Has been taking indomethacin as prescribed by the orthopedic, one pill a day.  No trauma that he can recall. 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    Now however over the past couple months also has stiffness in both wrists, knees, ankles and shoulders that is sore and worse when not moving or in the morning. Difficult to  tightly with both hands. Gets better during the day with movement. The other joints get worse when the left is more sore. The soreness has not gone away since January. No joint has become red and warm. No low back pain. No weakness, no rashes, no chest pain, no shortness of breath, no night sweats. No history of sudden joint pain.     Weight is down 20 lbs though similar to prior baseline.  Says eating less and exercising more, some jogging prior to January though initially said he was not doing anything new for exercise.    Social Factors    Tobacco: no  EtOH: no  Other drugs: no  Living situation: lives alone in apartmetn  Mood: fair, normal  Stress: no changes  Exercise: daily activity  Diet: eats rice and fast food Sick contacts: no  Sexual history: no  Employment: delivers " "pizza  Travel: 15 years ago, in [place]  Social support: some friends and family  Hobbies/fun: no hobbies     -----------------------------------------------------------------  Assessment & Plan   Damian Tejada is a 61 year old male with a history of CAD (2016 PCI s/p 2 NATY), HTN, psoriasis. He is here for \"Left wrist pain- it propagated or caused my whole body to be sore and sluggishness, particularly my joints from head to toe.\" Overall presentation concerning for a systemic arthritis versus other etiologies.    L Wrist pain and swelling  Symmetric joint pain in shoulders, elbows, wrists, knees, ankles  20 pound weight loss  Acute onset L wrist pain and swelling that has not resolved for 6 months, along with symmetric joint pain in other large joints. No joint deformity but some swelling still in L wrist. Worse in the morning, improves in 15-20 minutes of use but pain present all day up to 5/10 severity in L wrist that is swollen. No radiation, numbness, tingling. Also with 20 lb weight loss that is unclear how intentional. Responds somewhat to indomethacin. Has a history of psoriasis, but no back pain. Will consider RA, autoimmune disease, OA, less likely gout, malignancy.  - Refill of indomethacin for 10 days, return to clinic  - XR wrists bilaterally  - CBC, BMP, LFTs  - ESR, CRP  - RA, anti-CCP  - uric acid    Hx CAD (2016 PCI s/p 2 NATY), HTN  - needs ASA 81mg, atorvastatin, coreg refilled  - lipids    Prediabetes  A1C 6.3 in 2019  - recheck A1C    Hypertension Risk Reduction  Hypertensive 180/108 today  - restart coreg  - recheck BP in clinic in one week on coreg    Azael Campbell MD (PGY-3), staffed and seen with Dr. Aragon  [facility]  Staff addendum:  -----------------------------------------------------------------  Physical Exam   BP (!) 180/108   Pulse 65   Wt 78.5 kg (173 lb)   SpO2 98%   BMI 27.92 kg/m    Wt Readings from Last 3 Encounters:   06/01/21 78.5 kg (173 lb) "   02/28/20 87.5 kg (193 lb)   01/08/20 87.5 kg (193 lb)     Body mass index is 27.92 kg/m .    General Appearance: in no apparent distress  HEENT: pupils equal and reactive to light  Respiratory: CTAB  Cardiovascular: RRR, no murmurs  GI: abdomen nontender, nondistended, BS+  Lymph/Hematologic: no LAD  Genitourinary: not examined  Skin: no rashes   Musculoskeletal: no tenderness to palpation of L wrist though some mild swelling noted, no joint deformity of the wrist or other joints noted and no tenderness of other joints, full strength intact throughout, no peripheral edema, full strength in both hands with strong  strength  Neurologic: A@O  Psychiatric: appropriate affect    -----------------------------------------------------------------  Additional Patient History  Social History   Social History     Tobacco Use     Smoking status: Never Smoker     Smokeless tobacco: Never Used   Substance Use Topics     Alcohol use: No     Drug use: No       Current Medications   Current Outpatient Medications   Medication Sig Dispense Refill     aspirin (ASA) 81 MG chewable tablet Take 1 tablet (81 mg) by mouth daily 60 tablet 11     atorvastatin (LIPITOR) 80 MG tablet Take 1 tablet (80 mg) by mouth daily Call clinic to schedule follow up appointment. 30 tablet 0     carvedilol (COREG) 6.25 MG tablet Take 1 tablet (6.25 mg) by mouth 2 times daily (with meals) 180 tablet 3     indomethacin (INDOCIN) 25 MG capsule        indomethacin (INDOCIN) 25 MG capsule Take 1 capsule (25 mg) by mouth 2 times daily (with meals) 20 capsule 0     Was on aspirin 81mg.      Past Medical History    Past Medical History:   Diagnosis Date     BPH (benign prostatic hyperplasia)      Coronary artery disease 8/14/16    anterior STEMI s/p NATY X2     Hyperlipidemia LDL goal <70      Hypertension        Past Surgical History   Past Surgical History:   Procedure Laterality Date     HEMORRHOIDECTOMY  Summer 2008     PROCTOPLASTY  Summer 2008     RECTAL  SURGERY         Family History    Family History   Problem Relation Age of Onset     Skin Cancer No family hx of      Melanoma No family hx of    No family history of cancer, does not know if had medical issues. No siblings with cancer or cardiovascular dosease he states.    Allergies    No Known Allergies    Review of Systems   The 10 point Review of Systems is negative other than noted in the HPI or here.    Data: Reviewed in Epic.    Pt was seen and examined with Dr. Campbell.  I agree with his documentation as noted above.    My additional comments: None    Sterling Aragon MD

## 2021-06-01 ENCOUNTER — ANCILLARY PROCEDURE (OUTPATIENT)
Dept: GENERAL RADIOLOGY | Facility: CLINIC | Age: 61
End: 2021-06-01
Attending: STUDENT IN AN ORGANIZED HEALTH CARE EDUCATION/TRAINING PROGRAM
Payer: COMMERCIAL

## 2021-06-01 ENCOUNTER — OFFICE VISIT (OUTPATIENT)
Dept: INTERNAL MEDICINE | Facility: CLINIC | Age: 61
End: 2021-06-01
Payer: COMMERCIAL

## 2021-06-01 VITALS
HEART RATE: 65 BPM | SYSTOLIC BLOOD PRESSURE: 180 MMHG | DIASTOLIC BLOOD PRESSURE: 108 MMHG | WEIGHT: 173 LBS | BODY MASS INDEX: 27.92 KG/M2 | OXYGEN SATURATION: 98 %

## 2021-06-01 DIAGNOSIS — I10 ESSENTIAL HYPERTENSION: ICD-10-CM

## 2021-06-01 DIAGNOSIS — I25.10 CORONARY ARTERY DISEASE INVOLVING NATIVE CORONARY ARTERY OF NATIVE HEART WITHOUT ANGINA PECTORIS: ICD-10-CM

## 2021-06-01 DIAGNOSIS — M25.532 LEFT WRIST PAIN: Primary | ICD-10-CM

## 2021-06-01 DIAGNOSIS — M25.532 LEFT WRIST PAIN: ICD-10-CM

## 2021-06-01 LAB
ALBUMIN SERPL-MCNC: 3.6 G/DL (ref 3.4–5)
ALP SERPL-CCNC: 112 U/L (ref 40–150)
ALT SERPL W P-5'-P-CCNC: 22 U/L (ref 0–70)
ANION GAP SERPL CALCULATED.3IONS-SCNC: 8 MMOL/L (ref 3–14)
AST SERPL W P-5'-P-CCNC: 14 U/L (ref 0–45)
BASOPHILS # BLD AUTO: 0.1 10E9/L (ref 0–0.2)
BASOPHILS NFR BLD AUTO: 1.2 %
BILIRUB DIRECT SERPL-MCNC: <0.1 MG/DL (ref 0–0.2)
BILIRUB SERPL-MCNC: 0.3 MG/DL (ref 0.2–1.3)
BUN SERPL-MCNC: 14 MG/DL (ref 7–30)
CALCIUM SERPL-MCNC: 9.4 MG/DL (ref 8.5–10.1)
CHLORIDE SERPL-SCNC: 106 MMOL/L (ref 94–109)
CHOLEST SERPL-MCNC: 214 MG/DL
CO2 SERPL-SCNC: 27 MMOL/L (ref 20–32)
CREAT SERPL-MCNC: 1.24 MG/DL (ref 0.66–1.25)
CRP SERPL-MCNC: 12.4 MG/L (ref 0–8)
DIFFERENTIAL METHOD BLD: ABNORMAL
EOSINOPHIL # BLD AUTO: 0.2 10E9/L (ref 0–0.7)
EOSINOPHIL NFR BLD AUTO: 3.9 %
ERYTHROCYTE [DISTWIDTH] IN BLOOD BY AUTOMATED COUNT: 14.2 % (ref 10–15)
ERYTHROCYTE [SEDIMENTATION RATE] IN BLOOD BY WESTERGREN METHOD: 20 MM/H (ref 0–20)
GFR SERPL CREATININE-BSD FRML MDRD: 63 ML/MIN/{1.73_M2}
GLUCOSE SERPL-MCNC: 113 MG/DL (ref 70–99)
HBA1C MFR BLD: 6.3 % (ref 0–5.6)
HCT VFR BLD AUTO: 46.1 % (ref 40–53)
HDLC SERPL-MCNC: 53 MG/DL
HGB BLD-MCNC: 14.5 G/DL (ref 13.3–17.7)
IMM GRANULOCYTES # BLD: 0 10E9/L (ref 0–0.4)
IMM GRANULOCYTES NFR BLD: 0.3 %
LDLC SERPL CALC-MCNC: 121 MG/DL
LYMPHOCYTES # BLD AUTO: 1.5 10E9/L (ref 0.8–5.3)
LYMPHOCYTES NFR BLD AUTO: 25 %
MCH RBC QN AUTO: 25 PG (ref 26.5–33)
MCHC RBC AUTO-ENTMCNC: 31.5 G/DL (ref 31.5–36.5)
MCV RBC AUTO: 80 FL (ref 78–100)
MONOCYTES # BLD AUTO: 0.5 10E9/L (ref 0–1.3)
MONOCYTES NFR BLD AUTO: 8.6 %
NEUTROPHILS # BLD AUTO: 3.7 10E9/L (ref 1.6–8.3)
NEUTROPHILS NFR BLD AUTO: 61 %
NONHDLC SERPL-MCNC: 160 MG/DL
NRBC # BLD AUTO: 0 10*3/UL
NRBC BLD AUTO-RTO: 0 /100
PLATELET # BLD AUTO: 255 10E9/L (ref 150–450)
POTASSIUM SERPL-SCNC: 3.7 MMOL/L (ref 3.4–5.3)
PROT SERPL-MCNC: 7.9 G/DL (ref 6.8–8.8)
RBC # BLD AUTO: 5.8 10E12/L (ref 4.4–5.9)
SODIUM SERPL-SCNC: 141 MMOL/L (ref 133–144)
TRIGL SERPL-MCNC: 194 MG/DL
URATE SERPL-MCNC: 5.6 MG/DL (ref 3.5–7.2)
WBC # BLD AUTO: 6.1 10E9/L (ref 4–11)

## 2021-06-01 PROCEDURE — 86038 ANTINUCLEAR ANTIBODIES: CPT | Mod: 90 | Performed by: STUDENT IN AN ORGANIZED HEALTH CARE EDUCATION/TRAINING PROGRAM

## 2021-06-01 PROCEDURE — 99214 OFFICE O/P EST MOD 30 MIN: CPT | Mod: GC | Performed by: STUDENT IN AN ORGANIZED HEALTH CARE EDUCATION/TRAINING PROGRAM

## 2021-06-01 PROCEDURE — 73110 X-RAY EXAM OF WRIST: CPT | Mod: GC | Performed by: RADIOLOGY

## 2021-06-01 PROCEDURE — 85025 COMPLETE CBC W/AUTO DIFF WBC: CPT | Performed by: PATHOLOGY

## 2021-06-01 PROCEDURE — 80076 HEPATIC FUNCTION PANEL: CPT | Performed by: PATHOLOGY

## 2021-06-01 PROCEDURE — 80048 BASIC METABOLIC PNL TOTAL CA: CPT | Performed by: PATHOLOGY

## 2021-06-01 PROCEDURE — 84550 ASSAY OF BLOOD/URIC ACID: CPT | Performed by: PATHOLOGY

## 2021-06-01 PROCEDURE — 86140 C-REACTIVE PROTEIN: CPT | Performed by: PATHOLOGY

## 2021-06-01 PROCEDURE — 85652 RBC SED RATE AUTOMATED: CPT | Performed by: PATHOLOGY

## 2021-06-01 PROCEDURE — 83036 HEMOGLOBIN GLYCOSYLATED A1C: CPT | Performed by: PATHOLOGY

## 2021-06-01 PROCEDURE — 99000 SPECIMEN HANDLING OFFICE-LAB: CPT | Performed by: PATHOLOGY

## 2021-06-01 PROCEDURE — 86431 RHEUMATOID FACTOR QUANT: CPT | Mod: 90 | Performed by: STUDENT IN AN ORGANIZED HEALTH CARE EDUCATION/TRAINING PROGRAM

## 2021-06-01 PROCEDURE — 86200 CCP ANTIBODY: CPT | Mod: 90 | Performed by: PATHOLOGY

## 2021-06-01 PROCEDURE — 80061 LIPID PANEL: CPT | Performed by: PATHOLOGY

## 2021-06-01 PROCEDURE — 36415 COLL VENOUS BLD VENIPUNCTURE: CPT | Performed by: PATHOLOGY

## 2021-06-01 PROCEDURE — 86039 ANTINUCLEAR ANTIBODIES (ANA): CPT | Mod: 90 | Performed by: STUDENT IN AN ORGANIZED HEALTH CARE EDUCATION/TRAINING PROGRAM

## 2021-06-01 RX ORDER — INDOMETHACIN 25 MG/1
25 CAPSULE ORAL 2 TIMES DAILY WITH MEALS
Qty: 20 CAPSULE | Refills: 0 | Status: SHIPPED | OUTPATIENT
Start: 2021-06-01 | End: 2021-07-13

## 2021-06-01 RX ORDER — ATORVASTATIN CALCIUM 80 MG/1
80 TABLET, FILM COATED ORAL DAILY
Qty: 30 TABLET | Refills: 0 | Status: SHIPPED | OUTPATIENT
Start: 2021-06-01 | End: 2021-07-09

## 2021-06-01 RX ORDER — CARVEDILOL 6.25 MG/1
6.25 TABLET ORAL 2 TIMES DAILY WITH MEALS
Qty: 180 TABLET | Refills: 3 | Status: SHIPPED | OUTPATIENT
Start: 2021-06-01 | End: 2021-06-08

## 2021-06-01 RX ORDER — INDOMETHACIN 25 MG/1
CAPSULE ORAL
COMMUNITY
Start: 2021-04-10 | End: 2023-10-24

## 2021-06-01 RX ORDER — ASPIRIN 81 MG/1
81 TABLET, CHEWABLE ORAL DAILY
Qty: 60 TABLET | Refills: 11 | Status: SHIPPED | OUTPATIENT
Start: 2021-06-01 | End: 2021-07-09

## 2021-06-01 NOTE — NURSING NOTE
Chief Complaint   Patient presents with     Recheck Medication     pt would like to discuss high BP and joint pain     Kimberly Nissen, EMT at 8:41 AM on 6/1/2021

## 2021-06-02 LAB
ANA PAT SER IF-IMP: ABNORMAL
ANA SER QL IF: POSITIVE
ANA TITR SER IF: ABNORMAL {TITER}
CCP AB SER IA-ACNC: 1 U/ML
RHEUMATOID FACT SER NEPH-ACNC: <7 IU/ML (ref 0–20)

## 2021-06-08 ENCOUNTER — OFFICE VISIT (OUTPATIENT)
Dept: INTERNAL MEDICINE | Facility: CLINIC | Age: 61
End: 2021-06-08
Payer: COMMERCIAL

## 2021-06-08 VITALS — SYSTOLIC BLOOD PRESSURE: 167 MMHG | DIASTOLIC BLOOD PRESSURE: 89 MMHG | HEART RATE: 54 BPM | OXYGEN SATURATION: 97 %

## 2021-06-08 DIAGNOSIS — M25.50 MULTIPLE JOINT PAIN: ICD-10-CM

## 2021-06-08 DIAGNOSIS — M25.50 MULTIPLE JOINT PAIN: Primary | ICD-10-CM

## 2021-06-08 DIAGNOSIS — I25.10 CORONARY ARTERY DISEASE INVOLVING NATIVE CORONARY ARTERY OF NATIVE HEART WITHOUT ANGINA PECTORIS: ICD-10-CM

## 2021-06-08 DIAGNOSIS — I10 ESSENTIAL HYPERTENSION: ICD-10-CM

## 2021-06-08 PROCEDURE — 86255 FLUORESCENT ANTIBODY SCREEN: CPT | Mod: 90 | Performed by: PATHOLOGY

## 2021-06-08 PROCEDURE — 99213 OFFICE O/P EST LOW 20 MIN: CPT | Mod: GE | Performed by: STUDENT IN AN ORGANIZED HEALTH CARE EDUCATION/TRAINING PROGRAM

## 2021-06-08 PROCEDURE — 86225 DNA ANTIBODY NATIVE: CPT | Mod: 90 | Performed by: PATHOLOGY

## 2021-06-08 PROCEDURE — 99000 SPECIMEN HANDLING OFFICE-LAB: CPT | Performed by: PATHOLOGY

## 2021-06-08 PROCEDURE — 36415 COLL VENOUS BLD VENIPUNCTURE: CPT | Performed by: PATHOLOGY

## 2021-06-08 PROCEDURE — 86235 NUCLEAR ANTIGEN ANTIBODY: CPT | Mod: 90 | Performed by: PATHOLOGY

## 2021-06-08 RX ORDER — NAPROXEN 500 MG/1
500 TABLET ORAL 2 TIMES DAILY WITH MEALS
Qty: 60 TABLET | Refills: 0 | Status: SHIPPED | OUTPATIENT
Start: 2021-06-08 | End: 2021-07-09

## 2021-06-08 RX ORDER — CARVEDILOL 12.5 MG/1
12.5 TABLET ORAL 2 TIMES DAILY WITH MEALS
Qty: 60 TABLET | Refills: 11 | Status: SHIPPED | OUTPATIENT
Start: 2021-06-08 | End: 2021-07-09

## 2021-06-08 NOTE — PROGRESS NOTES
"                              [facility]  -----------------------------------------------------------------  History of Present Illness   Damian Tejada is a 61 year old male with a history of CAD (2016 PCI s/p 2 NATY), HTN, psoriasis. He is here for followup of \"Left wrist pain- it propagated or caused my whole body to be sore and sluggishness, particularly my joints from head to toe.\"    No major changes since last week. No new fever, chills, joint swelling other than stable L wrist. We will refer him to rheumatology given his acute onset L wrist pain and swelling that has not resolved for 6 months, symmetric joint pain in other large joints, 20 lb weight loss, CLIFF 1:160 speckled appearance, history of psoriasis (Dermatology noted in 2017: \"Circular, erythematous plaque left scalp; possible isolated psoriasis. Resolved with clobetasol.\"), and mildly elevated inflammatory markers (CRP 12, nml ESR).     He has now been taking his meds but is still hypertensive. We discussed importance of continuing his medications and that we will increase his coreg. No chest pain or SOB.    History from last visit:  No previous history of joint pain according to him but then Emeterio 10, 2021 had new onset with significant swelling and soreness of L wrist upon awakening which seemed to have occurred overnight. Saw an orthopedic surgeon shortly thereafter who wondered about gout or pseudogout. They got XR at that time and he states there were not acute findings. Has been taking indomethacin as prescribed by the orthopedic, one pill a day.  No trauma that he can recall. 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    Now however over the past couple months also has stiffness in both wrists, knees, ankles and shoulders that is sore and worse when not moving or in the morning. Difficult to  tightly with both hands. Gets better during the day " "with movement. The other joints get worse when the left is more sore. The soreness has not gone away since January. No joint has become red and warm. No low back pain. No weakness, no rashes, no chest pain, no shortness of breath, no night sweats. No history of sudden joint pain.     Weight is down 20 lbs though similar to prior baseline.  Says eating less and exercising more, some jogging prior to January though initially said he was not doing anything new for exercise.    Social Factors    Tobacco: no  EtOH: no  Other drugs: no  Living situation: lives alone in [place]  Mood: fair, normal  Stress: no changes  Exercise: daily activity  Diet: eats rice and fast food Sick contacts: no  Sexual history: no  Employment: delivers [employer]  Travel: 15 years ago, in [place]  Social support: some friends and family  Hobbies/fun: no hobbies     -----------------------------------------------------------------  Assessment & Plan   Damian Tejada is a 61 year old male with a history of CAD (2016 PCI s/p 2 NATY), HTN, psoriasis. He is here for \"Left wrist pain- it propagated or caused my whole body to be sore and sluggishness, particularly my joints from head to toe.\" Overall presentation concerning for a systemic arthritis versus other etiologies.    L Wrist pain and swelling  Symmetric joint pain in shoulders, elbows, wrists, knees, ankles  20 pound weight loss  CLIFF  Considering OA, CTD, atypical RA, PsA, and other chronic inflammatory conditions given his acute onset L wrist pain and swelling that has not resolved for 6 months, symmetric joint pain in other large joints, 20 lb weight loss, CLIFF 1:160 speckled appearance, history of psoriasis (Dermatology noted in 2017: \"Circular, erythematous plaque left scalp; possible isolated psoriasis. Resolved with clobetasol.\"), and mildly elevated inflammatory markers (CRP 12, nml ESR). Negative CCP, RF, bony changes on XR make classic RA less likely. For psoriatic arthritis, only 2 points " on CASPAR criteria for PsA. In considering gout, has symmetric joint pain as well and uric acid 5.6 and weight loss less consistent. In considering reactive arthritis, no known preceding infection. No known tick exposure and not sexually active. No joint deformity but some swelling still in L wrist. Pain worse in the morning, improves in 15-20 minutes of use but pain present all day up to 5/10 severity in L wrist that is swollen with soft tissue swelling main finding on XR. Responds somewhat to indomethacin.   - We will refer to rheumatology for their input; additional rheum labs ordered  - naproxen      Hypertension Risk Reduction  Hypertensive 167/89 today improved from 180/108 last time now on coreg but should have dose increased and give home BP monitor.  - increase coreg to 12.5mg BID (from 6.25mg BID)  - recheck BP in clinic in one month on coreg  - home BP monitoring    Hx CAD (2016 PCI s/p 2 NATY), HTN  - recently restarted ASA 81mg, atorvastatin, coreg last week  - lipids    Prediabetes  A1C 6.3 in 2019, again 6/2021    Azael Campbell MD (PGY-3), staffed and seen with Dr. Aragon  [facility]  Staff addendum:  -----------------------------------------------------------------  Physical Exam   BP (!) 167/89   Pulse 54   SpO2 97%   Wt Readings from Last 3 Encounters:   06/01/21 78.5 kg (173 lb)   02/28/20 87.5 kg (193 lb)   01/08/20 87.5 kg (193 lb)     There is no height or weight on file to calculate BMI.    General Appearance: in no apparent distress  HEENT: pupils equal and reactive to light  Respiratory: CTAB  Cardiovascular: RRR, no murmurs  GI: abdomen nontender, nondistended, BS+  Lymph/Hematologic: no LAD  Genitourinary: not examined  Skin: no rashes   Musculoskeletal: no tenderness to palpation of L wrist though some mild swelling noted, no joint deformity of the wrist or other joints noted and no tenderness of other joints, full strength intact throughout, no peripheral  edema, full strength in both hands with strong  strength  Neurologic: A@O  Psychiatric: appropriate affect    -----------------------------------------------------------------  Additional Patient History  Social History   Social History     Tobacco Use     Smoking status: Never Smoker     Smokeless tobacco: Never Used   Substance Use Topics     Alcohol use: No     Drug use: No       Current Medications   Current Outpatient Medications   Medication Sig Dispense Refill     aspirin (ASA) 81 MG chewable tablet Take 1 tablet (81 mg) by mouth daily 60 tablet 11     atorvastatin (LIPITOR) 80 MG tablet Take 1 tablet (80 mg) by mouth daily Call clinic to schedule follow up appointment. 30 tablet 0     carvedilol (COREG) 6.25 MG tablet Take 1 tablet (6.25 mg) by mouth 2 times daily (with meals) 180 tablet 3     indomethacin (INDOCIN) 25 MG capsule        indomethacin (INDOCIN) 25 MG capsule Take 1 capsule (25 mg) by mouth 2 times daily (with meals) 20 capsule 0     Was on aspirin 81mg.      Past Medical History    Past Medical History:   Diagnosis Date     BPH (benign prostatic hyperplasia)      Coronary artery disease 8/14/16    anterior STEMI s/p NATY X2     Hyperlipidemia LDL goal <70      Hypertension        Past Surgical History   Past Surgical History:   Procedure Laterality Date     HEMORRHOIDECTOMY  Summer 2008     PROCTOPLASTY  Summer 2008     RECTAL SURGERY         Family History    Family History   Problem Relation Age of Onset     Skin Cancer No family hx of      Melanoma No family hx of    No family history of cancer, does not know if had medical issues. No siblings with cancer or cardiovascular dosease he states.    Allergies    No Known Allergies    Review of Systems   The 10 point Review of Systems is negative other than noted in the HPI or here.    Data: Reviewed in Epic.    While the patient was in clinic, I reviewed the pertinent medical history and results.  I discussed the current findings on physical  examination, as well as the patient s diagnosis and treatment plan with the resident and agree with the information as documented with the following exceptions: none.  Sterling Aragon MD

## 2021-06-08 NOTE — NURSING NOTE
Chief Complaint   Patient presents with     RECHECK     1 week f/u       CHAR Hassan at 12:05 PM on 6/8/2021

## 2021-06-09 LAB
ANCA AB PATTERN SER IF-IMP: NORMAL
C-ANCA TITR SER IF: NORMAL {TITER}
DSDNA AB SER-ACNC: 1 IU/ML
ENA RNP IGG SER IA-ACNC: 0.2 AI (ref 0–0.9)
ENA SM IGG SER-ACNC: <0.2 AI (ref 0–0.9)
ENA SS-A IGG SER IA-ACNC: <0.2 AI (ref 0–0.9)
ENA SS-B IGG SER IA-ACNC: <0.2 AI (ref 0–0.9)

## 2021-06-22 ENCOUNTER — TELEPHONE (OUTPATIENT)
Dept: INTERNAL MEDICINE | Facility: CLINIC | Age: 61
End: 2021-06-22

## 2021-06-22 NOTE — TELEPHONE ENCOUNTER
Call and left voice message to help schedule recheck BP in clinic in one month per Dr Campbell last visit on 6/8/21. No answered. Give [facility] number to call to schedule.    Noa Jackson, Clinic Coordinator, June 22, 2021 at 8:24 AM

## 2021-07-09 ENCOUNTER — ALLIED HEALTH/NURSE VISIT (OUTPATIENT)
Dept: INTERNAL MEDICINE | Facility: CLINIC | Age: 61
End: 2021-07-09
Payer: COMMERCIAL

## 2021-07-09 VITALS
OXYGEN SATURATION: 99 % | WEIGHT: 172 LBS | HEIGHT: 67 IN | RESPIRATION RATE: 16 BRPM | BODY MASS INDEX: 27 KG/M2 | DIASTOLIC BLOOD PRESSURE: 78 MMHG | HEART RATE: 49 BPM | SYSTOLIC BLOOD PRESSURE: 141 MMHG

## 2021-07-09 DIAGNOSIS — I25.10 CORONARY ARTERY DISEASE INVOLVING NATIVE CORONARY ARTERY OF NATIVE HEART WITHOUT ANGINA PECTORIS: ICD-10-CM

## 2021-07-09 DIAGNOSIS — I10 ESSENTIAL HYPERTENSION: ICD-10-CM

## 2021-07-09 DIAGNOSIS — M25.50 MULTIPLE JOINT PAIN: ICD-10-CM

## 2021-07-09 PROCEDURE — 99207 PR NO CHARGE NURSE ONLY: CPT

## 2021-07-09 RX ORDER — ASPIRIN 81 MG/1
81 TABLET, CHEWABLE ORAL DAILY
Qty: 100 TABLET | Refills: 4 | Status: SHIPPED | OUTPATIENT
Start: 2021-07-09 | End: 2021-10-14

## 2021-07-09 RX ORDER — ATORVASTATIN CALCIUM 80 MG/1
80 TABLET, FILM COATED ORAL DAILY
Qty: 30 TABLET | Refills: 0 | Status: CANCELLED | OUTPATIENT
Start: 2021-07-09

## 2021-07-09 RX ORDER — ATORVASTATIN CALCIUM 80 MG/1
80 TABLET, FILM COATED ORAL DAILY
Qty: 90 TABLET | Refills: 1 | Status: SHIPPED | OUTPATIENT
Start: 2021-07-09 | End: 2021-10-14

## 2021-07-09 RX ORDER — ASPIRIN 81 MG/1
81 TABLET, CHEWABLE ORAL DAILY
Qty: 60 TABLET | Refills: 11 | Status: CANCELLED | OUTPATIENT
Start: 2021-07-09

## 2021-07-09 RX ORDER — CARVEDILOL 12.5 MG/1
12.5 TABLET ORAL 2 TIMES DAILY WITH MEALS
Qty: 200 TABLET | Refills: 1 | Status: SHIPPED | OUTPATIENT
Start: 2021-07-09 | End: 2022-06-30

## 2021-07-09 RX ORDER — NAPROXEN 500 MG/1
500 TABLET ORAL 2 TIMES DAILY WITH MEALS
Qty: 60 TABLET | Refills: 1 | Status: SHIPPED | OUTPATIENT
Start: 2021-07-09 | End: 2021-09-08

## 2021-07-09 RX ORDER — NAPROXEN 500 MG/1
500 TABLET ORAL 2 TIMES DAILY WITH MEALS
Qty: 60 TABLET | Refills: 0 | Status: CANCELLED | OUTPATIENT
Start: 2021-07-09

## 2021-07-09 RX ORDER — CARVEDILOL 12.5 MG/1
12.5 TABLET ORAL 2 TIMES DAILY WITH MEALS
Qty: 60 TABLET | Refills: 11 | Status: CANCELLED | OUTPATIENT
Start: 2021-07-09

## 2021-07-09 ASSESSMENT — MIFFLIN-ST. JEOR: SCORE: 1553.82

## 2021-07-09 ASSESSMENT — PAIN SCALES - GENERAL: PAINLEVEL: NO PAIN (0)

## 2021-07-09 NOTE — NURSING NOTE
The patient was greeted in the 4th floor lobby and escorted back to the clinic. The patient's weight was recorded without incident. The patient was escorted to the exam room without incident. The reason for today's visit was discussed with the patient. The following are the primary complaints of the patient:     Chief Complaint   Patient presents with     Hypertension     The patient presents today to have his blood pressure checked.       The patient's allergies and medications were reviewed as noted. A set of vitals were recorded as noted without incident.     Rikki Deras, EMT at 11:20 AM on 7/9/2021

## 2021-07-09 NOTE — TELEPHONE ENCOUNTER
"The patient was greeted in the 4th floor lobby and escorted back to the clinic. The patient's weight was recorded without incident. The patient was escorted to the exam room without incident. The reason for today's visit was discussed with the patient. The following are the primary complaints of the patient:           Chief Complaint   Patient presents with     Hypertension       The patient presents today to have his blood pressure checked.         The patient's allergies and medications were reviewed as noted. A set of vitals were recorded as noted without incident.       Older Vitals  7/9/21 11:29 AM  7/9/21 11:34 AM  7/9/21 11:38 AM     BP  136/80   143/75   141/78     BP Location    Right arm     Patient Position    Sitting     Cuff Size    Adult Regular     Pulse    49     Resp    16     SpO2    99%     Weight    78 kg (172 lb)     Height    1.702 m (5' 7\")     Pain Score    No Pain (0)      The patient is requesting refills on his medication--the refills have been pended for Dr. Aragon.    Rikki Deras, EMT at 12:45 PM on 7/9/2021        "

## 2021-08-13 ENCOUNTER — TELEPHONE (OUTPATIENT)
Dept: RHEUMATOLOGY | Facility: CLINIC | Age: 61
End: 2021-08-13

## 2021-08-13 NOTE — LETTER
August 27, 2021        Damian Tejada  [address]  [facility] [address]              Dear Damian,    Please call our clinic at [phone] to reschedule your virtual visit on 9/28/2021. Dr. Santoro would like to see you in the clinic. Thank you.      Sincerely,    [facility]

## 2021-08-13 NOTE — TELEPHONE ENCOUNTER
----- Message from Miranda Santoro MD sent at 8/13/2021  7:39 AM CDT -----  Hi,    This gentleman is the one that could not connect on video visit few weeks ago and we could not get a hold of him. It looks like he is rescheduled as a video visit again. Not sure if he self-scheduled? He needs to be seen in-person. Thanks

## 2021-08-13 NOTE — TELEPHONE ENCOUNTER
"Per provider request please call and reschedule patient's appointment on 9/8/2021. He will need an \"NEW\" in clinic visit with provider. Provider was not able to connect with patient virtually at last visit.      TORI Lopez [facility]       "

## 2021-08-16 NOTE — TELEPHONE ENCOUNTER
"LMTCB x2       Anna Ruano, ROSALIE         Per provider request please call and reschedule patient's appointment on 9/8/2021. He will need an \"NEW\" in clinic visit with provider. Provider was not able to connect with patient virtually at last visit.        "

## 2021-08-27 NOTE — TELEPHONE ENCOUNTER
Multiple attempts made to patient to switch his visit. No call back from patient. Did you want a letter to be mailed to this patient? Please advise. Thank you.      TORI Lopez [facility]

## 2021-09-08 ENCOUNTER — VIRTUAL VISIT (OUTPATIENT)
Dept: RHEUMATOLOGY | Facility: CLINIC | Age: 61
End: 2021-09-08
Payer: COMMERCIAL

## 2021-09-08 DIAGNOSIS — M25.532 LEFT WRIST PAIN: Primary | ICD-10-CM

## 2021-09-08 DIAGNOSIS — R76.8 POSITIVE ANA (ANTINUCLEAR ANTIBODY): ICD-10-CM

## 2021-09-08 PROCEDURE — 99204 OFFICE O/P NEW MOD 45 MIN: CPT | Mod: 95 | Performed by: STUDENT IN AN ORGANIZED HEALTH CARE EDUCATION/TRAINING PROGRAM

## 2021-09-08 NOTE — PROGRESS NOTES
Damian is a 61 year old who is being evaluated via a billable video visit.      How would you like to obtain your AVS? MyChart  If the video visit is dropped, the invitation should be resent by: Send to e-mail at: [email or web address]  Will anyone else be joining your video visit? No      Video Start Time: 9:45  Video-Visit Details    Type of service:  Video Visit    Video End Time:10:20    Originating Location (pt. Location): Home    Distant Location (provider location):  [facility]     Platform used for Video Visit: [employer]

## 2021-09-08 NOTE — NURSING NOTE
Staff offered to help patient schedule in clinic visit with provider, he declined and would like to try a virtual visit again. He apologized of the last visit, his phone had  and was not charged. Staff informed patient that if we can't connect with him virtually this time he will need to schedule an in clinic visit. He agreed to plan. Patient informed to stay and have phone ready by 9:45.      TORI Lopez  [facility]

## 2021-09-08 NOTE — NURSING NOTE
TRACY for [employer] mailed to patient's home address with pre-stamped envelope.       TORI Lopez [facility]

## 2021-09-08 NOTE — PROGRESS NOTES
"RHEUMATOLOGY INITIAL VIDEO CONSULT NOTE    Chief Complaint:    Chief Complaint   Patient presents with     Consult     left wrist pain, currently 3-4/10 for pain       Reason for consult: Dr. Azael Campbell from  has requested consultation for this patient for L wrist pain    Video visit may not be as thorough as an in-person visit and physical exam limitations may pose a challenge in formulating an accurate diagnosis.       History of Present Illness:    Damian Tejada is a 61 year old male with pmhx psoriasis, CAD s/p PCI, HTN, is referred to rheumatology clinic for L wrist pain. He started having new onset L wrist pain beginning of Jan 2021 along with significant swelling which came on overnight. He denies any trauma or injury. Was seen by orthopedics at [facility] (Dr Tamara Guzman) in [place] and idea of gout or CPPD was entertained. Was started on steroid medication (he does not recall the name) for 3 weeks which provided improvement and resolution of swelling, but joint pain persisted. He reports developing pain all over his body since then, which gets better with hot weather but worse with cold weather. Has since developed pain in b/l wrists, knees, ankles, shoulders. Denies any hand pain. Pain improves with activity and worse with rest/immobility. Pain bothers him more than anything else. He reports some joint stiffness in AM which improves as the day goes on. Stiffness lasts around 15 minutes and improves with activity. He has not been taking anything for pain. He has tried ibuprofen and tylenol, neither provided relief. He occasionally takes indomethacin as needed. He finds indomethacin to be helpful and feels that pain recurs once he stops indomethacin. He feels his symptoms are much better than in January. He is able to do his daily activities but lifting things such as chairs etc causes wrist pain (L>R). R wrist feels normal. His worst joint is the L wrist.       He was diagnosed with psoriasis \"long time\" ago. " He does not have any active psoriatic lesions. He reports he has not had symptoms related to psoriasis for a very long time. He has never been on treatment (systemic or topical) for psoriasis.      Denies fevers, chills, weight loss, night sweats, headaches, vision changes, jaw claudication, eye pain/redness, dry eyes, dry mouth, oral/nasal ulcers, sinusitis, epistaxis, hearing loss, rash, raynaud's, cough, SOB, pleurisy, chest pain, edema, heartburn, difficulty swallowing, abdominal pain, diarrhea, hematochezia, melena, dysuria, recurrent genital ulcers, back pain, enthesitis, dactylitis, numbness, weakness, tingling. No hx of IBD. No hx of blood clots. No hx of GI/ infections or tick bites.    Pertinent labs and imaging: (per chart review in [facility] and care everywhere)    Labs:   6/8/21: negative CBC w diff, +CLIFF 1:160 speckled, negative RF, CCP, +CRP 12.4, negative ESR, uric acid 5.6, negative ANCA, dsDNA, Sm, SSA, SSB, RNP       Past Medical History:    Past Medical History:   Diagnosis Date     BPH (benign prostatic hyperplasia)      Coronary artery disease 8/14/16    anterior STEMI s/p NATY X2     Hyperlipidemia LDL goal <70      Hypertension      Past Surgical History:   Past Surgical History:   Procedure Laterality Date     HEMORRHOIDECTOMY  Summer 2008     PROCTOPLASTY  Summer 2008     RECTAL SURGERY       Family History:   Denies family hx of RA, SLE, Sjogren's syndrome, scleroderma, PsA, ankylosing spondylitis, psoriasis, vasculitis, gout, pseudogout    Social History:   Alcohol use: none  Tobacco use: never  Occupational hx: works as a     No Known Allergies   Immunization History   Administered Date(s) Administered     COVID-19,PF,Pfizer 04/05/2021, 04/26/2021     Flu, Unspecified 10/20/2017     Influenza (IIV3) PF 11/05/2015, 12/13/2016     Influenza,INJ,MDCK,PF,Quad >4yrs 01/08/2019     TDAP Vaccine (Boostrix) 08/23/2016       Medications:  Current Outpatient Medications   Medication Sig  Dispense Refill     aspirin (ASA) 81 MG chewable tablet Take 1 tablet (81 mg) by mouth daily 100 tablet 4     atorvastatin (LIPITOR) 80 MG tablet Take 1 tablet (80 mg) by mouth daily Call clinic to schedule follow up appointment. 90 tablet 1     carvedilol (COREG) 12.5 MG tablet Take 1 tablet (12.5 mg) by mouth 2 times daily (with meals) 200 tablet 1     indomethacin (INDOCIN) 25 MG capsule        PHYSICAL EXAMINATION: (limited as pt was evaluated via video visit)  Vital signs: (not taken due to evaluation via video visit)    Skin: no facial rashes  Pulm: non-labored respirations, no conversational dyspnea  MSK: no hand joint swelling, able to make a fist b/l, wrist ROM intact b/l, ROM in shoulder intact b/l    Labs:      I have reviewed all pertinent investigations including labs, including outside records if relevant    RF/CCP  Recent Labs   Lab Test 06/01/21  1016   CCPIGG 1   RHF <7     CLIFF  Recent Labs   Lab Test 06/01/21  1016   DEMETRIA Positive*   ANAP1 SPECKLED   ANAT1 1:160     RNP/Sm/SSA/SSB  Recent Labs   Lab Test 06/08/21  1324   RNPIGG 0.2   SMIGG <0.2   SSAIGG <0.2   SSBIGG <0.2     dsDNA  Recent Labs   Lab Test 06/08/21  1324   DNA 1     ANCA  Recent Labs   Lab Test 06/08/21  1324   ANCAT <1:10   ANCAP The ANCA IFA is <1:10.  No further testing will be performed.     CBC  Recent Labs   Lab Test 06/01/21  1016 01/29/19  1714 12/21/16  1307 08/14/16  1200   WBC 6.1 4.6 9.3 7.8   RBC 5.80 5.90 3.90* 5.51   HGB 14.5 14.6 10.1* 14.6   HCT 46.1 47.3 32.3* 44.9   MCV 80 80 83 82   RDW 14.2 13.5 14.1 13.2    289 234 180   MCH 25.0* 24.7* 25.9* 26.5   MCHC 31.5 30.9* 31.3* 32.5   NEUTROPHIL 61.0  --  80.9 52.9   LYMPH 25.0  --  13.8 38.1   MONOCYTE 8.6  --  4.9 5.8   EOSINOPHIL 3.9  --  0.0 2.1   BASOPHIL 1.2  --  0.2 0.8   ANEU 3.7  --  7.5 4.1   ALYM 1.5  --  1.3 3.0   MARITZA 0.5  --  0.5 0.5   AEOS 0.2  --  0.0 0.2   ABAS 0.1  --  0.0 0.1     CMP  Recent Labs   Lab Test 06/01/21  1016 01/29/19  7153  12/21/16  1307 12/13/16  1758 08/15/16  0625 08/14/16  1200    138 143 144 139 141   POTASSIUM 3.7 4.2 4.7 4.6 3.8 3.6   CHLORIDE 106 104 109 108 106 108   CO2 27 29 24 30 23 20   ANIONGAP 8 5 10 6 9 13   * 119* 165* 100* 143* 164*   BUN 14 11 41* 21 13 14   CR 1.24 1.26* 1.41* 1.51* 1.06 1.12   GFRESTIMATED 63 63 52* 48* 73 68   GFRESTBLACK 73 73 63 58* 88 83   DEBORAH 9.4 9.3 8.7 8.8 8.5 9.1   BILITOTAL 0.3  --  0.2  --   --  0.7   ALBUMIN 3.6  --  3.8  --   --  3.5   PROTTOTAL 7.9  --  6.9  --   --  7.3   ALKPHOS 112  --  53  --   --  75   AST 14  --  18  --   --  14   ALT 22  --  31  --   --  24     HgA1c  Recent Labs   Lab Test 06/01/21  1016 01/29/19  1714   A1C 6.3* 6.3*     Uric Acid  Recent Labs   Lab Test 06/01/21  1016   URIC 5.6     Calcium/VitaminD  Recent Labs   Lab Test 06/01/21  1016 01/29/19  1714 12/21/16  1307   DEBORAH 9.4 9.3 8.7     ESR/CRP  Recent Labs   Lab Test 06/01/21  1016   SED 20   CRP 12.4*     Lipid Panel  Recent Labs   Lab Test 06/01/21  1016 01/29/19  1714 10/26/16  1100 04/30/14  1124   CHOL 214* 231* 169 264*   TRIG 194* 184* 102 205*   HDL 53 61 65 62   * 134* 84 161*   VLDL  --   --   --  41*   CHOLHDLRATIO  --   --   --  4.2   NHDL 160* 170* 104  --      Hepatitis C  Recent Labs   Lab Test 08/23/16  1125   HCVAB Nonreactive   Assay performance characteristics have not been established for newborns,   infants, and children       UA  Recent Labs   Lab Test 12/17/15  1600 04/30/14  1118   COLOR Yellow Light Yellow   APPEARANCE Clear Clear   URINEGLC Negative Negative   URINEBILI Negative Negative   SG 1.016 1.011   URINEPH 5.5 5.5   PROTEIN Negative Negative   NITRITE Negative Negative   UBLD Negative Trace*   LEUKEST Negative Negative   WBCU <1 <1   RBCU 1 <1   MUCOUS Present* Present*     Urine Microscopic  Recent Labs   Lab Test 12/17/15  1600 04/30/14  1118   WBCU <1 <1   RBCU 1 <1   MUCOUS Present* Present*     Imaging:      I have reviewed all pertinent  investigations including imaging, including outside records if relevant    XR b/l wrist (6/1/21)   Impression:   1. Mild soft tissue swelling of the left wrist without acute osseous   abnormality.   2. No bony erosion or other substantial degenerative change.         Assessment / Plan:    Damian Tejada is a 61 year old male with pmhx psoriasis, CAD s/p PCI, HTN, is referred to rheumatology clinic for chronic L wrist pain that started in January with sudden onset swelling. Per patient, he was seen by orthopedics and started on steroids x 3 weeks with resolution of swelling. I do not have orthopedics records from [facility] today. Any prior notes, outside records, laboratory results, and imaging studies were reviewed if relevant. Pertinent work-up thus far includes negative CBC w diff, +CLFIF 1:160 speckled, negative RF, CCP, +CRP 12.4, negative ESR, uric acid 5.6, negative ANCA, dsDNA, Sm, SSA, SSB, RNP. He is a poor historian but from what I can gather, since onset of L wrist swelling, he also developed generalized all over body pain involving various joints. He has some stiffness which lasts around 15 minutes. His pain seems to improve with activity. His pain overall has improved since onset of symptoms in January and generalized pain does not bother him. He is able to perform daily activities without any issues. What is most bothersome at this time is ongoing L wrist pain, which is worse with activities such as lifting or moving things. He has not had any recurrent swelling and has never had swelling over any other joints. He reports being diagnosed with psoriasis many years ago. Denies being on any treatment and reports that he does not have any active psoriatic lesions. We discussed obtaining MRI of his L wrist to further evaluate his chronic L wrist pain. Ddx include crystalline arthropathy vs PsA vs seronegative RA.     He was also found to have low level CLIFF positivity 1:160. We discussed that a positive CLIFF can be seen  in a variety of different diseases and syndrome.  The anti-nuclear antibody is a screening test for auto-immune disease.  Depending on the level or titer, it may be seen in autoimmune diseases such as SLE, sjogren's, scleroderma but could also be seen in auto-immune hepatitis, hypothyroidism and various other disease.  A positive CLIFF does not give one a conclusive diagnosis of SLE as it may be a false positive and may not be significant as well.  Further w/u for auto-immune disease can be considered based on clinical symptoms and other laboratory findings.  In the absence of rashes, photosensitivity, pleurisy, mucocutaneous lesions, sicca symptoms, raynaud's, my suspicion for an underlying autoimmune CTD is low.      1) L wrist pain, chronic  -plain films of b/l wrists negative for inflammatory findings/erosions  -obtain MRI L wrist  -repeat uric acid  -check lyme ab  -obtain records from [facility]    2) +CLIFF 1:160  -check C3, C4, UA, UPC, Hep C, TSH        Mr. Tejada verbalized agreement with and understanding of the rationale for the diagnosis and treatment plan.  All questions were answered to best of my ability and the patient's satisfaction. Mr. Tejada was advised to contact the clinic with any questions that may arise after the clinic visit.        Chart documentation done in part with Dragon Voice recognition Software. Although reviewed after completion, some word and grammatical error may remain.      RTC 6-7 weeks, in-person    Miranda Santoro MD

## 2021-09-08 NOTE — PATIENT INSTRUCTIONS
-Please make an appointment for lab and x-rays at any [facility] location near you  -I will be in touch once all test results are back  -Please schedule MRI of left wrist  -Follow-up 6-7 weeks, in-person

## 2021-09-11 ENCOUNTER — HEALTH MAINTENANCE LETTER (OUTPATIENT)
Age: 61
End: 2021-09-11

## 2021-09-13 ENCOUNTER — LAB (OUTPATIENT)
Dept: LAB | Facility: CLINIC | Age: 61
End: 2021-09-13
Payer: COMMERCIAL

## 2021-09-13 DIAGNOSIS — M25.532 LEFT WRIST PAIN: ICD-10-CM

## 2021-09-13 DIAGNOSIS — R76.8 POSITIVE ANA (ANTINUCLEAR ANTIBODY): ICD-10-CM

## 2021-09-13 LAB
ALBUMIN UR-MCNC: NEGATIVE MG/DL
APPEARANCE UR: CLEAR
B BURGDOR IGG+IGM SER QL: 0.04
BILIRUB UR QL STRIP: NEGATIVE
COLOR UR AUTO: YELLOW
CREAT UR-MCNC: 99 MG/DL
GLUCOSE UR STRIP-MCNC: NEGATIVE MG/DL
HCV AB SERPL QL IA: NONREACTIVE
HGB UR QL STRIP: ABNORMAL
KETONES UR STRIP-MCNC: NEGATIVE MG/DL
LEUKOCYTE ESTERASE UR QL STRIP: NEGATIVE
MUCOUS THREADS #/AREA URNS LPF: PRESENT /LPF
NITRATE UR QL: NEGATIVE
PH UR STRIP: 5 [PH] (ref 5–7)
PROT UR-MCNC: 0.1 G/L
PROT/CREAT 24H UR: 0.1 G/G CR (ref 0–0.2)
RBC URINE: <1 /HPF
SP GR UR STRIP: 1.01 (ref 1–1.03)
TSH SERPL DL<=0.005 MIU/L-ACNC: 1.63 MU/L (ref 0.4–4)
URATE SERPL-MCNC: 5.5 MG/DL (ref 3.5–7.2)
UROBILINOGEN UR STRIP-MCNC: NORMAL MG/DL
WBC URINE: <1 /HPF

## 2021-09-13 PROCEDURE — 84443 ASSAY THYROID STIM HORMONE: CPT | Performed by: PATHOLOGY

## 2021-09-13 PROCEDURE — 86618 LYME DISEASE ANTIBODY: CPT | Mod: 90 | Performed by: PATHOLOGY

## 2021-09-13 PROCEDURE — 86803 HEPATITIS C AB TEST: CPT | Mod: 90 | Performed by: PATHOLOGY

## 2021-09-13 PROCEDURE — 84550 ASSAY OF BLOOD/URIC ACID: CPT | Performed by: PATHOLOGY

## 2021-09-13 PROCEDURE — 84156 ASSAY OF PROTEIN URINE: CPT | Performed by: PATHOLOGY

## 2021-09-13 PROCEDURE — 81001 URINALYSIS AUTO W/SCOPE: CPT | Performed by: PATHOLOGY

## 2021-09-13 PROCEDURE — 86160 COMPLEMENT ANTIGEN: CPT | Mod: 90 | Performed by: PATHOLOGY

## 2021-09-13 PROCEDURE — 36415 COLL VENOUS BLD VENIPUNCTURE: CPT | Performed by: PATHOLOGY

## 2021-09-13 NOTE — LETTER
September 22, 2021      Damian Tejada  [address]  [place] [address]        Dear Damian,       Labs are largely negative. Additional tests for lupus (complement C3 and C4) are negative. Hepatitis C, thyroid tests are negative. Antibody for lyme disease is negative. Urine test showed some blood without red blood cells or protein. I would recommend following up with primary care provider regarding blood in the urine. I will be in touch once again after the MRI results are back.       Please let us know if you have any questions or concerns.       Regards,   Miranda Santoro MD       Resulted Orders   TSH with free T4 reflex   Result Value Ref Range    TSH 1.63 0.40 - 4.00 mU/L   Lyme Disease Britney with reflex to WB Serum   Result Value Ref Range    Lyme Disease Antibodies Total 0.04 <0.90      Comment:      Negative, Absence of detectable Borrelia burdorferi antibodies. A negative result does not exclude the possibility of Borrelia burgdorferi infection. If early Lyme disease is suspected, a second sample should be collected and tested 2 to 4 weeks later.   Uric acid   Result Value Ref Range    Uric Acid 5.5 3.5 - 7.2 mg/dL   UA with Microscopic reflex to Culture   Result Value Ref Range    Color Urine Yellow Colorless, Straw, Light Yellow, Yellow    Appearance Urine Clear Clear    Glucose Urine Negative Negative mg/dL    Bilirubin Urine Negative Negative    Ketones Urine Negative Negative mg/dL    Specific Gravity Urine 1.013 1.003 - 1.035    Blood Urine Small (A) Negative    pH Urine 5.0 5.0 - 7.0    Protein Albumin Urine Negative Negative mg/dL    Urobilinogen Urine Normal Normal, 2.0 mg/dL    Nitrite Urine Negative Negative    Leukocyte Esterase Urine Negative Negative    Mucus Urine Present (A) None Seen /LPF    RBC Urine <1 <=2 /HPF    WBC Urine <1 <=5 /HPF    Narrative    Urine Culture not indicated   Protein  random urine with Creat Ratio   Result Value Ref Range    Total Protein Random Urine g/L  0.10 g/L      Comment:      The reference range has not been established for total protein in random urine samples.  The result should be integrated into the clinical context for interpretation.    Total Protein Urine g/gr Creatinine 0.10 0.00 - 0.20 g/g Cr    Creatinine Urine mg/dL 99 mg/dL   Complement C4   Result Value Ref Range    C4 Complement 35 13 - 39 mg/dL   Complement C3   Result Value Ref Range    C3 Complement 117 81 - 157 mg/dL   Hepatitis C antibody   Result Value Ref Range    Hepatitis C Antibody Nonreactive Nonreactive    Narrative    Assay performance characteristics have not been established for newborns, infants, and children.

## 2021-09-14 LAB
C3 SERPL-MCNC: 117 MG/DL (ref 81–157)
C4 SERPL-MCNC: 35 MG/DL (ref 13–39)

## 2021-09-20 NOTE — RESULT ENCOUNTER NOTE
Dear Damian,     Labs are largely negative. Additional tests for lupus (complement C3 and C4) are negative. Hepatitis C, thyroid tests are negative. Antibody for lyme disease is negative. Urine test showed some blood without red blood cells or protein. I would recommend following up with primary care provider regarding blood in the urine. I will be in touch once again after the MRI results are back.    Please let us know if you have any questions or concerns.    Regards,  Miranda Santoro MD

## 2021-09-23 ENCOUNTER — ANCILLARY PROCEDURE (OUTPATIENT)
Dept: MRI IMAGING | Facility: CLINIC | Age: 61
End: 2021-09-23
Attending: STUDENT IN AN ORGANIZED HEALTH CARE EDUCATION/TRAINING PROGRAM
Payer: COMMERCIAL

## 2021-09-23 DIAGNOSIS — M25.532 LEFT WRIST PAIN: ICD-10-CM

## 2021-09-23 PROCEDURE — 73221 MRI JOINT UPR EXTREM W/O DYE: CPT | Mod: LT | Performed by: RADIOLOGY

## 2021-09-27 ENCOUNTER — TELEPHONE (OUTPATIENT)
Dept: RHEUMATOLOGY | Facility: CLINIC | Age: 61
End: 2021-09-27

## 2021-09-27 NOTE — TELEPHONE ENCOUNTER
----- Message from Miranda Santoro MD sent at 9/27/2021 10:17 AM CDT -----  Team -please schedule either a telephone/video visit to discuss MRI results and treatment options. Thanks!    Dear Damian,      Your MRI results indicate an underlying inflammatory arthritis. I will have my team reach out to you to schedule a telephone or video visit with me to discuss these in further detail in addition to treatment options.      Please let us know if you have any questions or concerns.     Regards,  Miranda Santoro MD

## 2021-09-27 NOTE — RESULT ENCOUNTER NOTE
Team -please schedule either a telephone/video visit to discuss MRI results and treatment options. Thanks!    Dear Damian,     Your MRI results indicate an underlying inflammatory arthritis. I will have my team reach out to you to schedule a telephone or video visit with me to discuss these in further detail in addition to treatment options.     Please let us know if you have any questions or concerns.    Regards,  Miranda Santoro MD

## 2021-09-27 NOTE — TELEPHONE ENCOUNTER
Called and LVM for patient to call back to schedule a telephone visit/video visit to discuss MRI results and treatment options.      TORI Lopez [facility]

## 2021-09-29 NOTE — TELEPHONE ENCOUNTER
Called and LVM for patient to call back to schedule follow-up via video visit or telephone visit.      TORI Lopez [facility]

## 2021-10-04 NOTE — PROGRESS NOTES
Damian is a 61 year old who is being evaluated via a billable telephone visit.      What phone number would you like to be contacted at? [phone]   How would you like to obtain your AVS? Marcus  Phone call duration: 10 minutes

## 2021-10-06 ENCOUNTER — VIRTUAL VISIT (OUTPATIENT)
Dept: RHEUMATOLOGY | Facility: CLINIC | Age: 61
End: 2021-10-06
Payer: COMMERCIAL

## 2021-10-06 DIAGNOSIS — M25.532 LEFT WRIST PAIN: Primary | ICD-10-CM

## 2021-10-06 PROCEDURE — 99212 OFFICE O/P EST SF 10 MIN: CPT | Mod: 95 | Performed by: STUDENT IN AN ORGANIZED HEALTH CARE EDUCATION/TRAINING PROGRAM

## 2021-10-06 NOTE — PATIENT INSTRUCTIONS
1. Follow-up with primary care provider regarding shortness of breath. If symptoms are severe or worsening, recommend being evaluated in urgent care/ED  2. Recommend follow-up with primary care provider regarding blood in the urine  3. Follow-up with me on 10/28 as scheduled

## 2021-10-06 NOTE — PROGRESS NOTES
RHEUMATOLOGY TELEPHONE FOLLOW-UP NOTE    Chief Complaint:    Chief Complaint   Patient presents with     RECHECK     discuss treatment options, need to update care everywhere     Telephone visits are not as thorough as in-person visits and inability to perform a physical exam may pose challenges and limitations in formulating an accurate diagnosis.     Interval History:    -underwent an MRI of L wrist which showed findings suggestive of inflammatory arthritis  -he reports some pain over his b/l ankles, b/l elbows and b/l shoulders  -he denies any current joint swelling  -he reports AM stiffness which lasts ~10 minutes, improves with moving around  -he is not taking any indomethacin  -he states that his pain is very manageable and he is able to get by his daily activities without any issues  -denies any alcohol use  -reports some SOB with exertion while jogging     HPI (per initial consult on 9/8/21):  Damian Tejada is a 61 year old male with pmhx psoriasis, CAD s/p PCI, HTN, is referred to rheumatology clinic for L wrist pain. He started having new onset L wrist pain beginning of Jan 2021 along with significant swelling which came on overnight. He denies any trauma or injury. Was seen by orthopedics at [facility] (Dr Tamara Guzman) in [place] and idea of gout or CPPD was entertained. Was started on steroid medication (he does not recall the name) for 3 weeks which provided improvement and resolution of swelling, but joint pain persisted. He reports developing pain all over his body since then, which gets better with hot weather but worse with cold weather. Has since developed pain in b/l wrists, knees, ankles, shoulders. Denies any hand pain. Pain improves with activity and worse with rest/immobility. Pain bothers him more than anything else. He reports some joint stiffness in AM which improves as the day goes on. Stiffness lasts around 15 minutes and improves with activity. He has not been taking anything for pain. He has  "tried ibuprofen and tylenol, neither provided relief. He occasionally takes indomethacin as needed. He finds indomethacin to be helpful and feels that pain recurs once he stops indomethacin. He feels his symptoms are much better than in January. He is able to do his daily activities but lifting things such as chairs etc causes wrist pain (L>R). R wrist feels normal. His worst joint is the L wrist.       He was diagnosed with psoriasis \"long time\" ago. He does not have any active psoriatic lesions. He reports he has not had symptoms related to psoriasis for a very long time. He has never been on treatment (systemic or topical) for psoriasis.      Denies fevers, chills, weight loss, night sweats, headaches, vision changes, jaw claudication, eye pain/redness, dry eyes, dry mouth, oral/nasal ulcers, sinusitis, epistaxis, hearing loss, rash, raynaud's, cough, SOB, pleurisy, chest pain, edema, heartburn, difficulty swallowing, abdominal pain, diarrhea, hematochezia, melena, dysuria, recurrent genital ulcers, back pain, enthesitis, dactylitis, numbness, weakness, tingling. No hx of IBD. No hx of blood clots. No hx of GI/ infections or tick bites.     Pertinent labs and imaging: (per chart review in [facility] and care everywhere)     Labs:   9/13/21: negative TSH, lyme, UPC, +UA with microscopic hematuria without RBCs, negative Hep C, C3, C4, uric acid 5.5  6/8/21: negative CBC w diff, +CLIFF 1:160 speckled, negative RF, CCP, +CRP 12.4, negative ESR, uric acid 5.6, negative ANCA, dsDNA, Sm, SSA, SSB, RNP        Medications:  Current Outpatient Medications   Medication Sig Dispense Refill     aspirin (ASA) 81 MG chewable tablet Take 1 tablet (81 mg) by mouth daily 100 tablet 4     atorvastatin (LIPITOR) 80 MG tablet Take 1 tablet (80 mg) by mouth daily Call clinic to schedule follow up appointment. 90 tablet 1     carvedilol (COREG) 12.5 MG tablet Take 1 tablet (12.5 mg) by mouth 2 times daily (with meals) 200 tablet 1     " indomethacin (INDOCIN) 25 MG capsule          PHYSICAL EXAMINATION: not performed as pt evaluated via telephone    Labs:      I have reviewed all pertinent investigations including labs, including outside records if relevant    RF/CCP  Recent Labs   Lab Test 06/01/21  1016   CCPIGG 1   RHF <7     CLIFF  Recent Labs   Lab Test 06/01/21  1016   DEMETRIA Positive*   ANAP1 SPECKLED   ANAT1 1:160     RNP/Sm/SSA/SSB  Recent Labs   Lab Test 06/08/21  1324   RNPIGG 0.2   SMIGG <0.2   SSAIGG <0.2   SSBIGG <0.2     dsDNA  Recent Labs   Lab Test 06/08/21  1324   DNA 1     C3/C4  Recent Labs   Lab Test 09/13/21  1145   V4JGJST 117   D4RZYVA 35     ANCA  Recent Labs   Lab Test 06/08/21  1324   ANCAT <1:10   ANCAP The ANCA IFA is <1:10.  No further testing will be performed.     CBC  Recent Labs   Lab Test 06/01/21  1016 01/29/19  1714 12/21/16  1307 08/15/16  0625 08/14/16  1200   WBC 6.1 4.6 9.3   < > 7.8   RBC 5.80 5.90 3.90*   < > 5.51   HGB 14.5 14.6 10.1*   < > 14.6   HCT 46.1 47.3 32.3*   < > 44.9   MCV 80 80 83   < > 82   RDW 14.2 13.5 14.1   < > 13.2    289 234   < > 180   MCH 25.0* 24.7* 25.9*   < > 26.5   MCHC 31.5 30.9* 31.3*   < > 32.5   NEUTROPHIL 61.0  --  80.9  --  52.9   LYMPH 25.0  --  13.8  --  38.1   MONOCYTE 8.6  --  4.9  --  5.8   EOSINOPHIL 3.9  --  0.0  --  2.1   BASOPHIL 1.2  --  0.2  --  0.8   ANEU 3.7  --  7.5  --  4.1   ALYM 1.5  --  1.3  --  3.0   MARITZA 0.5  --  0.5  --  0.5   AEOS 0.2  --  0.0  --  0.2   ABAS 0.1  --  0.0  --  0.1    < > = values in this interval not displayed.     CMP  Recent Labs   Lab Test 06/01/21  1016 01/29/19  1714 12/21/16  1307 12/13/16  1758 08/15/16  0625 08/15/16  0625 08/14/16  1201 08/14/16  1200    138 143 144   < > 139   < > 141   POTASSIUM 3.7 4.2 4.7 4.6   < > 3.8   < > 3.6   CHLORIDE 106 104 109 108   < > 106   < > 108   CO2 27 29 24 30   < > 23   < > 20   ANIONGAP 8 5 10 6   < > 9   < > 13   * 119* 165* 100*  --  143*  --  164*   BUN 14 11 41* 21   <  > 13   < > 14   CR 1.24 1.26* 1.41* 1.51*   < > 1.06   < > 1.12   GFRESTIMATED 63 63 52* 48*   < > 73   < > 68   GFRESTBLACK 73 73 63 58*   < > 88   < > 83   DEBORAH 9.4 9.3 8.7 8.8   < > 8.5   < > 9.1   BILITOTAL 0.3  --  0.2  --   --   --   --  0.7   ALBUMIN 3.6  --  3.8  --   --   --   --  3.5   PROTTOTAL 7.9  --  6.9  --   --   --   --  7.3   ALKPHOS 112  --  53  --   --   --   --  75   AST 14  --  18  --   --   --   --  14   ALT 22  --  31  --   --   --   --  24    < > = values in this interval not displayed.     HgA1c  Recent Labs   Lab Test 06/01/21  1016 01/29/19  1714   A1C 6.3* 6.3*     Uric Acid  Recent Labs   Lab Test 09/13/21  1145 06/01/21  1016   URIC 5.5 5.6     Calcium/VitaminD  Recent Labs   Lab Test 06/01/21  1016 01/29/19  1714 12/21/16  1307   DEBORAH 9.4 9.3 8.7     ESR/CRP  Recent Labs   Lab Test 06/01/21  1016   SED 20   CRP 12.4*     TSH/T4  Recent Labs   Lab Test 09/13/21  1145   TSH 1.63     Lipid Panel  Recent Labs   Lab Test 06/01/21  1016 01/29/19  1714 10/26/16  1100 04/30/14  1124 04/30/14  1124   CHOL 214* 231* 169   < > 264*   TRIG 194* 184* 102   < > 205*   HDL 53 61 65   < > 62   * 134* 84   < > 161*   VLDL  --   --   --   --  41*   CHOLHDLRATIO  --   --   --   --  4.2   NHDL 160* 170* 104  --   --     < > = values in this interval not displayed.     Hepatitis C  Recent Labs   Lab Test 09/13/21  1145 08/23/16  1125   HCVAB Nonreactive Nonreactive   Assay performance characteristics have not been established for newborns,   infants, and children       UA  Recent Labs   Lab Test 09/13/21  1145 12/17/15  1600 04/30/14  1118   COLOR Yellow Yellow Light Yellow   APPEARANCE Clear Clear Clear   URINEGLC Negative Negative Negative   URINEBILI Negative Negative Negative   SG 1.013 1.016 1.011   URINEPH 5.0 5.5 5.5   PROTEIN Negative Negative Negative   NITRITE Negative Negative Negative   UBLD Small* Negative Trace*   LEUKEST Negative Negative Negative   WBCU <1 <1 <1   RBCU <1 1 <1    MUCOUS  --  Present* Present*     Urine Microscopic  Recent Labs   Lab Test 09/13/21  1145 12/17/15  1600 04/30/14  1118   WBCU <1 <1 <1   RBCU <1 1 <1   MUCOUS  --  Present* Present*     Urine Protein  Recent Labs   Lab Test 09/13/21  1145   UTP 0.10   UTPG 0.10   UCRR 99     Imaging:   I have reviewed all pertinent investigations including imaging, including outside records if relevant    IMPRESSION:  1. Constellation of imaging findings concerning for inflammatory  arthritis with multiple edema-like marrow signal intensities, likely  reflecting reactive osteitis.  2. Extensor carpi ulnaris tendinosis with mild tenosynovitis and  subsheath injury.  3. At least partial tear of the central portion of the triangular  fibrocartilage disc proper, possible full-thickness.  4. Scapholunate interosseous ligament membranous portion likely  degeneration.       XR b/l wrist (6/1/21)   Impression:   1. Mild soft tissue swelling of the left wrist without acute osseous   abnormality.   2. No bony erosion or other substantial degenerative change.     Assessment / Plan:    Damian Tejada is a 61 year old male with pmhx psoriasis, CAD s/p PCI, HTN, is evaluated via telephone visit for polyarthralgia involving b/l hands/writs, shoulders, elbows, ankles. Has chronic L wrist pain that started in January 2021 with sudden onset swelling. Per patient, he was seen by orthopedics and started on steroids x 3 weeks with resolution of swelling. I do not have orthopedics records from [facility] today. Any prior notes, outside records, laboratory results, and imaging studies were reviewed if relevant. Pertinent work-up thus far includes negative CBC w diff, +CLIFF 1:160 speckled, negative RF, CCP, +CRP 12.4, negative ESR, uric acid 5.6, negative ANCA, dsDNA, Sm, SSA, SSB, RNP. He is a poor historian but from what I can gather, since onset of L wrist swelling, he also developed generalized all over body pain involving various joints. He has some  stiffness which lasts around 10-15 minutes. His pain seems to improve with activity. His pain overall has improved since onset of symptoms in January and generalized pain does not bother him. He is able to perform daily activities without any issues. What is most bothersome at this time is ongoing L wrist pain, which is worse with activities such as lifting or moving things. He has not had any recurrent swelling and has never had swelling over any other joints. He reports being diagnosed with psoriasis many years ago. Denies being on any treatment and reports that he does not have any active psoriatic lesions. I obtained MRI of his L wrist which shows some findings that could suggest an underlying inflammatory arthritis. Ddx include PsA vs seronegative RA vs CPPD (less likely).     1) Polyarthralgia. L wrist MRI with findings suggestive of inflammatory arthritis   -we discussed treatment options of inflammatory arthritis including methotrexate vs SSZ depending on his symptoms. Currently his symptoms are mild. I would like to examine him in-person and he has an appointment with me on 10/28. He is doing pretty good right now and pain does not interfere with his daily activities, therefore, he prefers to hold off on starting any prednisone or DMARD therapy until he sees me in clinic. I have advised him to inform me if he has any worsening symptoms in the interim  -plain films of b/l wrists negative for inflammatory findings/erosions  -MRI L wrist with findings suggestive of inflammatory arthritis   -check CBC w diff, Hep B and TB quant, CXR at upcoming visit  -negative Hep C 9/13/21    2) +CLIFF 1:160  -negative SSA, SSB, Sm, RNP, dsDNA, C3, C4    3) SOB with exertion  -I have advised patient to follow-up with PCP. He was under the impression that he is anemic. CBC w diff from June shows normal Hb. He states that he feels more tired/SOB after jogging compared to before. I have recommended that he talks to his PCP about  these symptoms and/or seek emergency care if symptoms are severe or worsening    4) Microscopic hematuria  -noted on recent UA, no RBCs  -recommend follow-up with PCP         Mr. Tejada verbalized agreement with and understanding of the rationale for the diagnosis and treatment plan.  All questions were answered to best of my ability and the patient's satisfaction. Mr. Tejada was advised to contact the clinic with any questions that may arise after the clinic visit.        Chart documentation done in part with Dragon Voice recognition Software. Although reviewed after completion, some word and grammatical error may remain.      RTC 10/28 as scheduled    Miranda Santoro MD

## 2021-10-14 ENCOUNTER — OFFICE VISIT (OUTPATIENT)
Dept: INTERNAL MEDICINE | Facility: CLINIC | Age: 61
End: 2021-10-14
Payer: COMMERCIAL

## 2021-10-14 VITALS
SYSTOLIC BLOOD PRESSURE: 160 MMHG | DIASTOLIC BLOOD PRESSURE: 91 MMHG | WEIGHT: 176.4 LBS | HEART RATE: 49 BPM | HEIGHT: 67 IN | BODY MASS INDEX: 27.69 KG/M2 | OXYGEN SATURATION: 99 %

## 2021-10-14 DIAGNOSIS — I10 ESSENTIAL HYPERTENSION: ICD-10-CM

## 2021-10-14 DIAGNOSIS — R06.09 DYSPNEA ON EXERTION: ICD-10-CM

## 2021-10-14 DIAGNOSIS — E78.5 HYPERLIPIDEMIA LDL GOAL <70: Primary | ICD-10-CM

## 2021-10-14 DIAGNOSIS — I25.10 CORONARY ARTERY DISEASE INVOLVING NATIVE CORONARY ARTERY OF NATIVE HEART WITHOUT ANGINA PECTORIS: ICD-10-CM

## 2021-10-14 DIAGNOSIS — R73.03 PRE-DIABETES: ICD-10-CM

## 2021-10-14 PROCEDURE — 99214 OFFICE O/P EST MOD 30 MIN: CPT | Mod: GC | Performed by: STUDENT IN AN ORGANIZED HEALTH CARE EDUCATION/TRAINING PROGRAM

## 2021-10-14 RX ORDER — EZETIMIBE 10 MG/1
10 TABLET ORAL DAILY
Qty: 30 TABLET | Refills: 3 | Status: SHIPPED | OUTPATIENT
Start: 2021-10-14 | End: 2022-06-30

## 2021-10-14 RX ORDER — LISINOPRIL 5 MG/1
5 TABLET ORAL DAILY
Qty: 30 TABLET | Refills: 3 | Status: SHIPPED | OUTPATIENT
Start: 2021-10-14 | End: 2022-06-30

## 2021-10-14 RX ORDER — ASPIRIN 81 MG/1
81 TABLET, CHEWABLE ORAL DAILY
Qty: 100 TABLET | Refills: 4 | Status: SHIPPED | OUTPATIENT
Start: 2021-10-14 | End: 2023-10-24

## 2021-10-14 RX ORDER — ATORVASTATIN CALCIUM 80 MG/1
80 TABLET, FILM COATED ORAL DAILY
Qty: 90 TABLET | Refills: 1 | Status: SHIPPED | OUTPATIENT
Start: 2021-10-14 | End: 2022-06-14

## 2021-10-14 ASSESSMENT — MIFFLIN-ST. JEOR: SCORE: 1573.78

## 2021-10-14 ASSESSMENT — PAIN SCALES - GENERAL: PAINLEVEL: NO PAIN (0)

## 2021-10-14 NOTE — PROGRESS NOTES
"I, Doyle Duque MD saw the patient with the resident, and agree with the resident's findings and plan of care as documented in the resident's note.  BP (!) 160/91 (BP Location: Right arm, Patient Position: Sitting, Cuff Size: Adult Regular)   Pulse (!) 49   Ht 1.702 m (5' 7\")   Wt 80 kg (176 lb 6.4 oz)   SpO2 99%   BMI 27.63 kg/m    I personally reviewed vital signs and past record.  Key findings: STEMI/stent 2016. BP follow-up, stopped a year ago, restarted. Has SANTANA, no pain. Trace lower edema.  a1c 6.1 earlier this summer.  BP Readings from Last 9 Encounters:   10/14/21 (!) 160/91   07/09/21 (!) 141/78   06/08/21 (!) 167/89   06/01/21 (!) 180/108   12/13/19 (!) 153/75   09/26/19 (!) 148/94   01/29/19 (!) 172/99   08/23/18 (!) 171/116   03/05/17 (!) 152/94         "

## 2021-10-14 NOTE — PROGRESS NOTES
"                     [facility]       SUBJECTIVE:  Damian Tejada is a 61 year old male with a PMHx of psoriasis, CAD (STEMI)  on 8/14/16 s/p NATY x2 to the proximal LAD and HTN who presents today for a follow up on BP.    Patient was last seen in June/2021 and his BP was 167/89 during that visit down from 180/108 prior. Hios carvedilol was increased to 12.5 mg daily and patient reports that his BP has been of an average of 140/80.   Today he reports that ran out of his ASA for a month now and his atorvastatin for 5 months. Per chart review, it seems patient has been on/off his ASA.    He reports that few months ago, he started to notice being short of breath when he walks about 1 block. He also had trace bilateral lower edema in the office today but he related this to his tight socks. No CP,orthopnea, PND or LLE      Medications and allergies reviewed by me today.     ROS:   Constitutional, neuro, ENT, endocrine, pulmonary, cardiac, gastrointestinal, genitourinary, musculoskeletal, integument and psychiatric systems are negative, except as otherwise noted.    OBJECTIVE:    BP (!) 160/91 (BP Location: Right arm, Patient Position: Sitting, Cuff Size: Adult Regular)   Pulse (!) 49   Ht 1.702 m (5' 7\")   Wt 80 kg (176 lb 6.4 oz)   SpO2 99%   BMI 27.63 kg/m     Wt Readings from Last 1 Encounters:   10/14/21 80 kg (176 lb 6.4 oz)     Physical Exam:  General: AAOx3, NAD  HEENT: Oral mucosa moist and non-erythematous, PERRLA, EOM intact  CV: RRR, normal S1S2, no murmur, clicks, rubs  Resp: Clear to auscultation bilaterally, no wheezes, rhonchi  Abd: Soft, non-tender, BS+, no masses appreciated  Extremities: Radial and pedal pulses intact and symmetric, no pedal edema  Neuro: No lateralizing symptoms or focal neurologic deficits       ASSESSMENT/PLAN:    Damian was seen today for recheck medication.    Diagnoses and all orders for this visit:    Hyperlipidemia LDL goal <70  Patient on 80 mg of atorvastatin and his " LDL of 120 <1 year ago and ~ 130 last year. He has been off his statin for the past 5 months and because of COVID era he was not eating as healthy as he used to, but his LDL is way out of goal and given he is high risk for CV disease I would be aggressive in managing his LDL.   -     ezetimibe (ZETIA) 10 MG tablet; Take 1 tablet (10 mg) by mouth daily  -     Continue PTA atorvastatin     Essential hypertension  Overall BP has improved on increasing dose of carvedilol up to 12.5 mg BID, but his BP now is 140/80 with HR in 50s. Will add lisinopril and have him follow up in 1 month to recheck his BP.   -     lisinopril (ZESTRIL) 5 MG tablet; Take 1 tablet (5 mg) by mouth daily  -     Continue PTA carvedilol     CAD s/p NATY x2 to pLAD (2016)  Dyspnea on exertion  Patient with several months of SANTANA when walking up to 1 block. No reported CP or other signs/sympotms of heart failure. On exam, he is dry and warm with trace LLE.   Refills:        -     aspirin (ASA) 81 MG chewable tablet; Take 1 tablet (81 mg) by mouth daily  -     atorvastatin (LIPITOR) 80 MG tablet; Take 1 tablet (80 mg) by mouth daily Call clinic to schedule follow up appointment.    Pre-diabetes  A1c of 6.3 in June 2021. Would benefit from metformin to treat his pre-diabetic state. Will plan to repeat A1c in 3 months  -     metFORMIN (GLUCOPHAGE) 500 MG tablet; Take 1 tablet (500 mg) by mouth 2 times daily (with meals)    Pt should return to clinic for f/u with me in 1 month    Edyta Clark MD  IM PGY-3  Oct 14, 2021    Pt was seen and plan of care discussed with Dr. Duque

## 2021-10-14 NOTE — NURSING NOTE
Chief Complaint   Patient presents with     Recheck Medication     Med refill and BP check       CHAR Gonzalez at 10:08 AM on 10/14/2021.

## 2021-10-22 NOTE — NURSING NOTE
"Damian Tejada's goals for this visit include:   Chief Complaint   Patient presents with     RECHECK     7 week follow-up, labs completed 9/13/2021, MRI completed 9/23/2021, update care everywhere     Imm/Inj     Flu Shot       PCP: Tawanna Mendez    Referring Provider:  No referring provider defined for this encounter.      Initial BP (!) 175/74 (BP Location: Left arm, Patient Position: Sitting)   Pulse 51   Temp 98.4  F (36.9  C) (Oral)   Wt 80.4 kg (177 lb 3.2 oz)   SpO2 100%   BMI 27.75 kg/m   Estimated body mass index is 27.75 kg/m  as calculated from the following:    Height as of 10/14/21: 1.702 m (5' 7\").    Weight as of this encounter: 80.4 kg (177 lb 3.2 oz).    Medication Reconciliation: complete    Do you need any medication refills at today's visit? none      TORI Lopez [facility] [place] [facility]   "

## 2021-10-25 NOTE — PROGRESS NOTES
RHEUMATOLOGY  FOLLOW-UP NOTE    Chief Complaint:    Chief Complaint   Patient presents with     RECHECK     7 week follow-up, labs completed 9/13/2021, MRI completed 9/23/2021, update care everywhere     Imm/Inj     Flu Shot     Interval History:  -he feels about the same symptomatically -has pain over the dorsum of the hand and palmar aspect of the wrist  -he also reports pain over knees, ankles and shoulders  -he has AM stiffness lasting around 10 minutes, and improves thereafter  -pain is improved with activity  -knee, ankle and shoulder pain is present constantly but not severe, not worse in AM  -he denies taking any anti-inflammatory medications  -denies any joint swelling  -denies any fevers, rashes, mouth/oral ulcers, weakness, back pain  -he denies any skin concerns  -denies any alcohol use  -he followed up with PCP for SOB symptoms    HPI (per initial consult on 9/8/21):  Damian Tejada is a 61 year old male with pmhx psoriasis, CAD s/p PCI, HTN, is referred to rheumatology clinic for L wrist pain. He started having new onset L wrist pain beginning of Jan 2021 along with significant swelling which came on overnight. He denies any trauma or injury. Was seen by orthopedics at [facility] (Dr Tamara Guzman) in [place] and idea of gout or CPPD was entertained. Was started on steroid medication (he does not recall the name) for 3 weeks which provided improvement and resolution of swelling, but joint pain persisted. He reports developing pain all over his body since then, which gets better with hot weather but worse with cold weather. Has since developed pain in b/l wrists, knees, ankles, shoulders. Denies any hand pain. Pain improves with activity and worse with rest/immobility. Pain bothers him more than anything else. He reports some joint stiffness in AM which improves as the day goes on. Stiffness lasts around 15 minutes and improves with activity. He has not been taking anything for pain. He has tried ibuprofen and  "tylenol, neither provided relief. He occasionally takes indomethacin as needed. He finds indomethacin to be helpful and feels that pain recurs once he stops indomethacin. He feels his symptoms are much better than in January. He is able to do his daily activities but lifting things such as chairs etc causes wrist pain (L>R). R wrist feels normal. His worst joint is the L wrist.       He was diagnosed with psoriasis \"long time\" ago. He does not have any active psoriatic lesions. He reports he has not had symptoms related to psoriasis for a very long time. He has never been on treatment (systemic or topical) for psoriasis.      Denies fevers, chills, weight loss, night sweats, headaches, vision changes, jaw claudication, eye pain/redness, dry eyes, dry mouth, oral/nasal ulcers, sinusitis, epistaxis, hearing loss, rash, raynaud's, cough, SOB, pleurisy, chest pain, edema, heartburn, difficulty swallowing, abdominal pain, diarrhea, hematochezia, melena, dysuria, recurrent genital ulcers, back pain, enthesitis, dactylitis, numbness, weakness, tingling. No hx of IBD. No hx of blood clots. No hx of GI/ infections or tick bites.     Pertinent labs and imaging: (per chart review in [facility] and care everywhere)     Labs:   9/13/21: negative TSH, lyme, UPC, +UA with microscopic hematuria without RBCs, negative Hep C, C3, C4, uric acid 5.5  6/8/21: negative CBC w diff, +CLIFF 1:160 speckled, negative RF, CCP, +CRP 12.4, negative ESR, uric acid 5.6, negative ANCA, dsDNA, Sm, SSA, SSB, RNP        Medications:  Current Outpatient Medications   Medication Sig Dispense Refill     aspirin (ASA) 81 MG chewable tablet Take 1 tablet (81 mg) by mouth daily 100 tablet 4     atorvastatin (LIPITOR) 80 MG tablet Take 1 tablet (80 mg) by mouth daily Call clinic to schedule follow up appointment. 90 tablet 1     carvedilol (COREG) 12.5 MG tablet Take 1 tablet (12.5 mg) by mouth 2 times daily (with meals) 200 tablet 1     ezetimibe (ZETIA) 10 MG " tablet Take 1 tablet (10 mg) by mouth daily 30 tablet 3     indomethacin (INDOCIN) 25 MG capsule        lisinopril (ZESTRIL) 5 MG tablet Take 1 tablet (5 mg) by mouth daily 30 tablet 3     metFORMIN (GLUCOPHAGE) 500 MG tablet Take 1 tablet (500 mg) by mouth 2 times daily (with meals) 60 tablet 1       PHYSICAL EXAMINATION:   Vital signs:  BP (!) 175/74 (BP Location: Left arm, Patient Position: Sitting)   Pulse 51   Temp 98.4  F (36.9  C) (Oral)   Wt 80.4 kg (177 lb 3.2 oz)   SpO2 100%   BMI 27.75 kg/m      MSK: +tenderness over dorsal aspect of the L hand over 3rd and 4th extensor digitorum tendons, no synovitis appreciated, decreased ROM of L wrist with both flexion and extension, no joint effusions/erythema/warmth, no nailfold capillary abnormalities, no nail pitting, no fingertip ulcerations, +trace pitting edema b/l, strength 5/5 throughout, no dactylitis, no enthesitis, no spinal tenderness, ROM in neck intact, no SI joint tenderness  Skin: no rashes, no psoriatic lesions appreciated  HEENT: MMM, no oral ulcers/lesions  CV: RRR  Pulm: CTAB, non-labored respirations, no c/r/w  Neuro: A&O x3, no focal deficits      Labs:      I have reviewed all pertinent investigations including labs, including outside records if relevant    RF/CCP  Recent Labs   Lab Test 06/01/21  1016   CCPIGG 1   RHF <7     CLIFF  Recent Labs   Lab Test 06/01/21  1016   DEMETRIA Positive*   ANAP1 SPECKLED   ANAT1 1:160     RNP/Sm/SSA/SSB  Recent Labs   Lab Test 06/08/21  1324   RNPIGG 0.2   SMIGG <0.2   SSAIGG <0.2   SSBIGG <0.2     dsDNA  Recent Labs   Lab Test 06/08/21  1324   DNA 1     C3/C4  Recent Labs   Lab Test 09/13/21  1145   [id number] 117   [id number] 35     ANCA  Recent Labs   Lab Test 06/08/21  1324   ANCAT <1:10   ANCAP The ANCA IFA is <1:10.  No further testing will be performed.     CBC  Recent Labs   Lab Test 06/01/21  1016 01/29/19  1714 12/21/16  1307 08/15/16  0625 08/14/16  1200   WBC 6.1 4.6 9.3   < > 7.8   RBC 5.80 5.90 3.90*    < > 5.51   HGB 14.5 14.6 10.1*   < > 14.6   HCT 46.1 47.3 32.3*   < > 44.9   MCV 80 80 83   < > 82   RDW 14.2 13.5 14.1   < > 13.2    289 234   < > 180   MCH 25.0* 24.7* 25.9*   < > 26.5   MCHC 31.5 30.9* 31.3*   < > 32.5   NEUTROPHIL 61.0  --  80.9  --  52.9   LYMPH 25.0  --  13.8  --  38.1   MONOCYTE 8.6  --  4.9  --  5.8   EOSINOPHIL 3.9  --  0.0  --  2.1   BASOPHIL 1.2  --  0.2  --  0.8   ANEU 3.7  --  7.5  --  4.1   ALYM 1.5  --  1.3  --  3.0   MARITZA 0.5  --  0.5  --  0.5   AEOS 0.2  --  0.0  --  0.2   ABAS 0.1  --  0.0  --  0.1    < > = values in this interval not displayed.     CMP  Recent Labs   Lab Test 06/01/21  1016 01/29/19  1714 12/21/16  1307 12/13/16  1758 08/15/16  0625 08/15/16  0625 08/14/16  1201 08/14/16  1200    138 143 144   < > 139   < > 141   POTASSIUM 3.7 4.2 4.7 4.6   < > 3.8   < > 3.6   CHLORIDE 106 104 109 108   < > 106   < > 108   CO2 27 29 24 30   < > 23   < > 20   ANIONGAP 8 5 10 6   < > 9   < > 13   * 119* 165* 100*  --  143*  --  164*   BUN 14 11 41* 21   < > 13   < > 14   CR 1.24 1.26* 1.41* 1.51*   < > 1.06   < > 1.12   GFRESTIMATED 63 63 52* 48*   < > 73   < > 68   GFRESTBLACK 73 73 63 58*   < > 88   < > 83   DEBORAH 9.4 9.3 8.7 8.8   < > 8.5   < > 9.1   BILITOTAL 0.3  --  0.2  --   --   --   --  0.7   ALBUMIN 3.6  --  3.8  --   --   --   --  3.5   PROTTOTAL 7.9  --  6.9  --   --   --   --  7.3   ALKPHOS 112  --  53  --   --   --   --  75   AST 14  --  18  --   --   --   --  14   ALT 22  --  31  --   --   --   --  24    < > = values in this interval not displayed.     HgA1c  Recent Labs   Lab Test 06/01/21  1016 01/29/19  1714   A1C 6.3* 6.3*     Uric Acid  Recent Labs   Lab Test 09/13/21  1145 06/01/21  1016   URIC 5.5 5.6     Calcium/VitaminD  Recent Labs   Lab Test 06/01/21  1016 01/29/19  1714 12/21/16  1307   DEBORAH 9.4 9.3 8.7     ESR/CRP  Recent Labs   Lab Test 06/01/21  1016   SED 20   CRP 12.4*     TSH/T4  Recent Labs   Lab Test 09/13/21  1145   TSH 1.63      Lipid Panel  Recent Labs   Lab Test 06/01/21  1016 01/29/19  1714 10/26/16  1100 04/30/14  1124 04/30/14  1124   CHOL 214* 231* 169   < > 264*   TRIG 194* 184* 102   < > 205*   HDL 53 61 65   < > 62   * 134* 84   < > 161*   VLDL  --   --   --   --  41*   CHOLHDLRATIO  --   --   --   --  4.2   NHDL 160* 170* 104  --   --     < > = values in this interval not displayed.     Hepatitis C  Recent Labs   Lab Test 09/13/21  1145 08/23/16  1125   HCVAB Nonreactive Nonreactive   Assay performance characteristics have not been established for newborns,   infants, and children       UA  Recent Labs   Lab Test 09/13/21  1145 12/17/15  1600 04/30/14  1118   COLOR Yellow Yellow Light Yellow   APPEARANCE Clear Clear Clear   URINEGLC Negative Negative Negative   URINEBILI Negative Negative Negative   SG 1.013 1.016 1.011   URINEPH 5.0 5.5 5.5   PROTEIN Negative Negative Negative   NITRITE Negative Negative Negative   UBLD Small* Negative Trace*   LEUKEST Negative Negative Negative   WBCU <1 <1 <1   RBCU <1 1 <1   MUCOUS  --  Present* Present*     Urine Microscopic  Recent Labs   Lab Test 09/13/21  1145 12/17/15  1600 04/30/14  1118   WBCU <1 <1 <1   RBCU <1 1 <1   MUCOUS  --  Present* Present*     Urine Protein  Recent Labs   Lab Test 09/13/21  1145   UTP 0.10   UTPG 0.10   UCRR 99     Imaging:   I have reviewed all pertinent investigations including imaging, including outside records if relevant    IMPRESSION:  1. Constellation of imaging findings concerning for inflammatory  arthritis with multiple edema-like marrow signal intensities, likely  reflecting reactive osteitis.  2. Extensor carpi ulnaris tendinosis with mild tenosynovitis and  subsheath injury.  3. At least partial tear of the central portion of the triangular  fibrocartilage disc proper, possible full-thickness.  4. Scapholunate interosseous ligament membranous portion likely  degeneration.     XR b/l wrist (6/1/21)   Impression:   1. Mild soft tissue  swelling of the left wrist without acute osseous   abnormality.   2. No bony erosion or other substantial degenerative change.     Assessment / Plan:    Damian Tejada is a 61 year old male with pmhx psoriasis, CAD s/p PCI, HTN, is presenting for follow-up evaluation of polyarthralgia involving b/l hands/writs, shoulders, elbows, ankles. Has chronic L wrist pain that started in January 2021 with sudden onset swelling. Per patient, he was seen by orthopedics and started on steroids x 3 weeks with resolution of swelling. I do not have orthopedics records from [facility] today. Any prior notes, outside records, laboratory results, and imaging studies were reviewed if relevant. Pertinent work-up thus far includes negative CBC w diff, +CLIFF 1:160 speckled, negative RF, CCP, +CRP 12.4, negative ESR, uric acid 5.6, negative ANCA, dsDNA, Sm, SSA, SSB, RNP.  Apart from L wrist pain, he also has pain over b/l knees, shoulders and ankles which is not as severe as the L wrist. He also reports some stiffness which lasts around 10-15 minutes. His pain improves with activity. His pain overall has improved since onset of symptoms in January and does not affect his daily activities. He has not had any recurrent swelling and has never had swelling over any other joints. He reports being diagnosed with psoriasis many years ago. Denies being on any treatment, no active psoriatic lesions. Denies any hx of inflammatory eye disease, IBD, no dactylitis, enthesis, no back pain. I obtained MRI of his L wrist which shows findings suggestive of an underlying inflammatory arthritis. Ddx include seronegative SpA such as PsA vs seronegative RA. No overt synovitis of L wrist but decreased ROM with flexion/extension appreciated along with tenderness along the extensor tendons. We discussed starting DMARD therapy for treatment of inflammatory arthritis.     1) Inflammatory arthritis -seronegative RA vs seronegative SpA (PsA)  -L wrist MRI with findings  suggestive of inflammatory arthritis   -plain films of b/l wrists negative for inflammatory findings/erosions  -MRI L wrist with findings suggestive of inflammatory arthritis   -obtain CBC w diff, CMP, Hep B and TB quant, CRP, CXR, plain films of b/l foot  -negative Hep C 9/13/21  -pending above labs, plan to start methotrexate 15 mg weekly and folic acid 1 mg daily  -# Methotrexate Risks and Benefits: The risks and benefits of methotrexate were discussed in detail and the patient verbalized understanding.  The risks discussed include, but are not limited to, the risk for hypersensitivity, anaphylaxis, anaphylactoid reactions, infections, bone marrow suppression, renal toxicity, hepatotoxicity, pulmonary toxicity, malignancy, impaired fertility, GI upset, alopecia, and oral and nasal sores.  Folic acid supplementation is recommended during methotrexate therapy to help prevent some of the side effects. Pregnancy prevention and planning was discussed; it is recommended that women of childbearing potential use reliable contraception during therapy.  The risks of taking both methotrexate and alcohol were reviewed; complete alcohol avoidance was discussed.  Routine laboratory monitoring is required during methotrexate therapy. Taking MTX once weekly, all within a 24 hour period was stressed and the patient verbalized this instruction back to me.  I encouraged reviewing the package insert and asking any questions about the medication.  -if methotrexate is started, he is advised to get monitoring labs 2 weeks after starting methotrexate     2) +CLIFF 1:160  -negative SSA, SSB, Sm, RNP, dsDNA, C3, C4    3) SOB with exertion  -followed up with PCP  -has TTE pending, scheduled for tomorrow    4) Microscopic hematuria  -noted on recent UA, no RBCs  -recommend follow-up with PCP    5) HTN, asymptomatic   -did not take his lisinopril this AM  -he will take his lisinopril once he gets home. [facility] to check BP. Advised  him to contact PCP if BP remains elevated         Mr. Tejada verbalized agreement with and understanding of the rationale for the diagnosis and treatment plan.  All questions were answered to best of my ability and the patient's satisfaction. Mr. Tejada was advised to contact the clinic with any questions that may arise after the clinic visit.        Chart documentation done in part with Dragon Voice recognition Software. Although reviewed after completion, some word and grammatical error may remain.      RTC 2 months, in-person    Miranda Santoro MD

## 2021-10-28 ENCOUNTER — OFFICE VISIT (OUTPATIENT)
Dept: RHEUMATOLOGY | Facility: CLINIC | Age: 61
End: 2021-10-28
Payer: COMMERCIAL

## 2021-10-28 VITALS
WEIGHT: 177.2 LBS | TEMPERATURE: 98.4 F | OXYGEN SATURATION: 100 % | HEART RATE: 51 BPM | DIASTOLIC BLOOD PRESSURE: 74 MMHG | SYSTOLIC BLOOD PRESSURE: 175 MMHG | BODY MASS INDEX: 27.75 KG/M2

## 2021-10-28 DIAGNOSIS — Z23 NEED FOR PNEUMOCOCCAL VACCINATION: ICD-10-CM

## 2021-10-28 DIAGNOSIS — Z23 NEED FOR PROPHYLACTIC VACCINATION AND INOCULATION AGAINST INFLUENZA: Primary | ICD-10-CM

## 2021-10-28 LAB
ALBUMIN SERPL-MCNC: 3.9 G/DL (ref 3.4–5)
ALP SERPL-CCNC: 104 U/L (ref 40–150)
ALT SERPL W P-5'-P-CCNC: 32 U/L (ref 0–70)
ANION GAP SERPL CALCULATED.3IONS-SCNC: 2 MMOL/L (ref 3–14)
AST SERPL W P-5'-P-CCNC: 23 U/L (ref 0–45)
BASOPHILS # BLD AUTO: 0.1 10E3/UL (ref 0–0.2)
BASOPHILS NFR BLD AUTO: 1 %
BILIRUB SERPL-MCNC: 0.6 MG/DL (ref 0.2–1.3)
BUN SERPL-MCNC: 17 MG/DL (ref 7–30)
CALCIUM SERPL-MCNC: 9.6 MG/DL (ref 8.5–10.1)
CHLORIDE BLD-SCNC: 107 MMOL/L (ref 94–109)
CO2 SERPL-SCNC: 30 MMOL/L (ref 20–32)
CREAT SERPL-MCNC: 1.32 MG/DL (ref 0.66–1.25)
CRP SERPL-MCNC: <2.9 MG/L (ref 0–8)
EOSINOPHIL # BLD AUTO: 0.2 10E3/UL (ref 0–0.7)
EOSINOPHIL NFR BLD AUTO: 4 %
ERYTHROCYTE [DISTWIDTH] IN BLOOD BY AUTOMATED COUNT: 13.9 % (ref 10–15)
GFR SERPL CREATININE-BSD FRML MDRD: 59 ML/MIN/1.73M2
GLUCOSE BLD-MCNC: 98 MG/DL (ref 70–99)
HBV CORE AB SERPL QL IA: NONREACTIVE
HBV SURFACE AB SERPL IA-ACNC: 0.02 M[IU]/ML
HBV SURFACE AG SERPL QL IA: NONREACTIVE
HCT VFR BLD AUTO: 44.7 % (ref 40–53)
HGB BLD-MCNC: 14 G/DL (ref 13.3–17.7)
IMM GRANULOCYTES # BLD: 0 10E3/UL
IMM GRANULOCYTES NFR BLD: 0 %
LYMPHOCYTES # BLD AUTO: 1.3 10E3/UL (ref 0.8–5.3)
LYMPHOCYTES NFR BLD AUTO: 24 %
MCH RBC QN AUTO: 24.6 PG (ref 26.5–33)
MCHC RBC AUTO-ENTMCNC: 31.3 G/DL (ref 31.5–36.5)
MCV RBC AUTO: 79 FL (ref 78–100)
MONOCYTES # BLD AUTO: 0.3 10E3/UL (ref 0–1.3)
MONOCYTES NFR BLD AUTO: 6 %
NEUTROPHILS # BLD AUTO: 3.4 10E3/UL (ref 1.6–8.3)
NEUTROPHILS NFR BLD AUTO: 65 %
NRBC # BLD AUTO: 0 10E3/UL
NRBC BLD AUTO-RTO: 0 /100
PLATELET # BLD AUTO: 241 10E3/UL (ref 150–450)
POTASSIUM BLD-SCNC: 3.9 MMOL/L (ref 3.4–5.3)
PROT SERPL-MCNC: 8.2 G/DL (ref 6.8–8.8)
RBC # BLD AUTO: 5.68 10E6/UL (ref 4.4–5.9)
SODIUM SERPL-SCNC: 139 MMOL/L (ref 133–144)
WBC # BLD AUTO: 5.3 10E3/UL (ref 4–11)

## 2021-10-28 PROCEDURE — 90682 RIV4 VACC RECOMBINANT DNA IM: CPT | Performed by: STUDENT IN AN ORGANIZED HEALTH CARE EDUCATION/TRAINING PROGRAM

## 2021-10-28 PROCEDURE — 90670 PCV13 VACCINE IM: CPT | Performed by: STUDENT IN AN ORGANIZED HEALTH CARE EDUCATION/TRAINING PROGRAM

## 2021-10-28 PROCEDURE — 86706 HEP B SURFACE ANTIBODY: CPT | Performed by: STUDENT IN AN ORGANIZED HEALTH CARE EDUCATION/TRAINING PROGRAM

## 2021-10-28 PROCEDURE — 99214 OFFICE O/P EST MOD 30 MIN: CPT | Mod: 25 | Performed by: STUDENT IN AN ORGANIZED HEALTH CARE EDUCATION/TRAINING PROGRAM

## 2021-10-28 PROCEDURE — 86704 HEP B CORE ANTIBODY TOTAL: CPT | Performed by: STUDENT IN AN ORGANIZED HEALTH CARE EDUCATION/TRAINING PROGRAM

## 2021-10-28 PROCEDURE — 36415 COLL VENOUS BLD VENIPUNCTURE: CPT | Performed by: STUDENT IN AN ORGANIZED HEALTH CARE EDUCATION/TRAINING PROGRAM

## 2021-10-28 PROCEDURE — 90472 IMMUNIZATION ADMIN EACH ADD: CPT | Performed by: STUDENT IN AN ORGANIZED HEALTH CARE EDUCATION/TRAINING PROGRAM

## 2021-10-28 PROCEDURE — 86140 C-REACTIVE PROTEIN: CPT | Performed by: STUDENT IN AN ORGANIZED HEALTH CARE EDUCATION/TRAINING PROGRAM

## 2021-10-28 PROCEDURE — 87340 HEPATITIS B SURFACE AG IA: CPT | Performed by: STUDENT IN AN ORGANIZED HEALTH CARE EDUCATION/TRAINING PROGRAM

## 2021-10-28 PROCEDURE — 80053 COMPREHEN METABOLIC PANEL: CPT | Performed by: STUDENT IN AN ORGANIZED HEALTH CARE EDUCATION/TRAINING PROGRAM

## 2021-10-28 PROCEDURE — 85025 COMPLETE CBC W/AUTO DIFF WBC: CPT | Performed by: STUDENT IN AN ORGANIZED HEALTH CARE EDUCATION/TRAINING PROGRAM

## 2021-10-28 PROCEDURE — 86481 TB AG RESPONSE T-CELL SUSP: CPT | Performed by: STUDENT IN AN ORGANIZED HEALTH CARE EDUCATION/TRAINING PROGRAM

## 2021-10-28 PROCEDURE — 90471 IMMUNIZATION ADMIN: CPT | Performed by: STUDENT IN AN ORGANIZED HEALTH CARE EDUCATION/TRAINING PROGRAM

## 2021-10-28 ASSESSMENT — PAIN SCALES - GENERAL: PAINLEVEL: MILD PAIN (3)

## 2021-10-28 NOTE — NURSING NOTE
Prior to immunization administration, verified patients identity using patient s name and date of birth. Please see Immunization Activity for additional information.     Screening Questionnaire for Adult Immunization    Are you sick today?   No   Do you have allergies to medications, food, a vaccine component or latex?   No   Have you ever had a serious reaction after receiving a vaccination?   No   Do you have a long-term health problem with heart, lung, kidney, or metabolic disease (e.g., diabetes), asthma, a blood disorder, no spleen, complement component deficiency, a cochlear implant, or a spinal fluid leak?  Are you on long-term aspirin therapy?   No   Do you have cancer, leukemia, HIV/AIDS, or any other immune system problem?   No   Do you have a parent, brother, or sister with an immune system problem?   No   In the past 3 months, have you taken medications that affect  your immune system, such as prednisone, other steroids, or anticancer drugs; drugs for the treatment of rheumatoid arthritis, Crohn s disease, or psoriasis; or have you had radiation treatments?   No   Have you had a seizure, or a brain or other nervous system problem?   No   During the past year, have you received a transfusion of blood or blood    products, or been given immune (gamma) globulin or antiviral drug?   No   For women: Are you pregnant or is there a chance you could become       pregnant during the next month?   No   Have you received any vaccinations in the past 4 weeks?   No     Immunization questionnaire answers were all negative.        Per orders of Dr. Santoro, injection of prevnar 13 given by Noa Reyez MA. Patient instructed to remain in clinic for 15 minutes afterwards, and to report any adverse reaction to me immediately.       Screening performed by Noa Reyez MA on 10/28/2021 at 11:29 AM.

## 2021-10-28 NOTE — PATIENT INSTRUCTIONS
1. Please get labs and x-rays today  2. Pending lab results, we will plan to start methotrexate 15 mg weekly and folic acid 1 mg daily. Let me know if any issues with methotrexate   3. If we start methotrexate, you will need labs 2 weeks after starting methotrexate. Please make lab appointment accordingly  4. Follow-up in 2 months    Patient Education     Methotrexate Oral Tablet 15 mg  Uses  This medicine is used for the following purposes:    arthritis    autoimmune disorder    inflammatory disease    psoriasis    cancer  Instructions  This medicine may be taken with or without food.  Keep the medicine at room temperature. Avoid heat and direct light.  Drink extra water while on this medicine. Adults should try to drink 6-8 cups (48 to 64 oz.) of water every day.  This medicine may cause you to become more sensitive to the sun. Use sunscreen or wear protective clothing when you are exposed to the sun.  It may take several weeks for this medicine to fully work.  It is important that you keep taking each dose of this medicine on time even if you are feeling well.  If you miss a dose, contact your doctor for instructions.  Please tell your doctor and pharmacist about all the medicines you take. Include both prescription and over-the-counter medicines. Also tell them about any vitamins, herbal medicines, or anything else you take for your health.  If your symptoms do not improve or they worsen while on this medicine, contact your doctor.  Talk to your doctor before taking other medicines, including aspirins and ibuprofen containing products. Speak to your doctor about which medicines are safe to use while you are on this medicine.  Do not suddenly stop taking this medicine. Check with your doctor before stopping.  Use effective birth control to avoid pregnancy.  It is very important that you follow your doctor's instructions for all blood tests.  It is very important that you keep all appointments for medical exams  and tests while on this medicine.  Cautions  Tell your doctor and pharmacist if you ever had an allergic reaction to a medicine. Symptoms of an allergic reaction can include trouble breathing, skin rash, itching, swelling, or severe dizziness.  This medicine is associated with a rare but very serious medical condition. Please speak with your doctor about symptoms you should look out for while on this medicine. Notify your doctor immediately if you develop those symptoms.  Some patients on this medicine have developed severe, life-threatening infections. Please speak with your doctor about the risks and benefits of using this medicine.  Some patients taking this medicine have experienced serious side effects. Please speak with your doctor to understand the risks and benefits associated with this medicine.  This medicine is associated with a rare, but serious problem of the liver. Speak to your doctor about the early signs of liver problems and the benefits and risks of using this medicine.  There is an increased risk of bleeding while on this medicine, please tell your doctor or nurse if you notice any excessive bleeding or bruising.  Do not use the medication any more than instructed.  Your ability to stay alert or to react quickly may be impaired by this medicine. Do not operate machinery or drive while on this medicine.  Your ability to stay alert or to react quickly may be impaired by this medicine. Do not drive or operate machinery until you know how this medicine will affect you.  Please check with your doctor before drinking alcohol while on this medicine.  This medicine may reduce your body's ability to fight infections. Try to avoid contact with people with colds, flu or other infections.  Contact your doctor if you develop any signs of a new infection such as fever, cough, sore throat, or chills.  Wash your hands often and avoid close contact with people with infections such as colds and flu.  Speak with  your health care provider before receiving any vaccinations.  Tell the doctor or pharmacist if you are pregnant, planning to be pregnant, or breastfeeding.  Do not breastfeed while on this medicine. You may safely start breastfeeding 1 week after stopping treatment.  Do not use this medicine if you are pregnant. If you become pregnant while on this medicine, contact your doctor immediately.  This medicine can hurt a new baby in the womb. If you become pregnant while on this medicine, tell your doctor immediately. Your doctor may switch you to a different medicine.  Women of child-bearing age should have a negative pregnancy test before starting this medicine.  Women must use reliable forms of birth control while taking this medicine and for 6 months after stopping to prevent pregnancy.  Men should continue using birth control for at least 3 months after finishing this medicine.  Ask your pharmacist if this medicine can interact with any of your other medicines. Be sure to tell them about all the medicines you take.  Please tell all your doctors and dentists that you are on this medicine before they provide care.  Do not start or stop any other medicines without first speaking to your doctor or pharmacist.  Call your doctor right away if you notice any unusual bleeding or bruising.  Do not share this medicine with anyone who has not been prescribed this medicine.  This medicine is associated with increased risk of death in certain patients. Please speak with your doctor about the benefits and risks of using this medicine.  Side Effects  The following is a list of some common side effects from this medicine. Please speak with your doctor about what you should do if you experience these or other side effects.    changes in the number of cells in the blood    dizziness    drowsiness or sedation    lack of energy and tiredness    hair loss    increased risk for an infection    liver problems    mouth sores or  irritation    nausea    skin irritation such as redness, itching, rash, or burning    stomach pain    stomach upset or abdominal pain    increased risk of sunburn    blurring or changes of vision    vomiting  Call your doctor or get medical help right away if you notice any of these more serious side effects:    back pain    increased risk of bleeding    bone pain    unusual bruising or discoloration on skin    cough that does not go away    diarrhea    fever or chills    flu-like symptoms    severe or persistent headache    fast or irregular heart beats    symptoms of liver damage (such as yellowing of skin or eyes, dark urine, unusual tiredness or weakness; severe stomach or back pain)    mood changes    muscle pain    muscle weakness    pale or blue skin, lips or fingernails    seizures    shortness of breath    blood in stool    dark, tarry stool    unusual or unexplained tiredness or weakness    urinating less often    blood in urine    severe or persistent vomiting    weakness on one side of the body  A few people may have an allergic reactions to this medicine. Symptoms can include difficulty breathing, skin rash, itching, swelling, or severe dizziness. If you notice any of these symptoms, seek medical help quickly.  Extra  Please speak with your doctor, nurse, or pharmacist if you have any questions about this medicine.  [email or web address]  IMPORTANT NOTE: This document tells you briefly how to take your medicine, but it does not tell you all there is to know about it.Your doctor or pharmacist may give you other documents about your medicine. Please talk to them if you have any questions.Always follow their advice. There is a more complete description of this medicine available in English.Scan this code on your smartphone or tablet or use the web address below. You can also ask your pharmacist for a printout. If you have any questions, please ask your pharmacist.     2021 [employer]

## 2021-10-29 ENCOUNTER — ANCILLARY PROCEDURE (OUTPATIENT)
Dept: CARDIOLOGY | Facility: CLINIC | Age: 61
End: 2021-10-29
Payer: COMMERCIAL

## 2021-10-29 VITALS — DIASTOLIC BLOOD PRESSURE: 72 MMHG | SYSTOLIC BLOOD PRESSURE: 128 MMHG

## 2021-10-29 DIAGNOSIS — I25.10 CORONARY ARTERY DISEASE INVOLVING NATIVE CORONARY ARTERY OF NATIVE HEART WITHOUT ANGINA PECTORIS: ICD-10-CM

## 2021-10-29 DIAGNOSIS — R06.09 DYSPNEA ON EXERTION: ICD-10-CM

## 2021-10-29 LAB — LVEF ECHO: NORMAL

## 2021-10-29 PROCEDURE — 93306 TTE W/DOPPLER COMPLETE: CPT | Performed by: INTERNAL MEDICINE

## 2021-10-29 PROCEDURE — 99207 PR STATISTIC IV PUSH SINGLE INITIAL SUBSTANCE: CPT | Performed by: INTERNAL MEDICINE

## 2021-10-29 RX ADMIN — Medication 5 ML: at 10:33

## 2021-10-30 LAB
QUANTIFERON MITOGEN: 6.39 IU/ML
QUANTIFERON NIL TUBE: 0.27 IU/ML
QUANTIFERON TB1 TUBE: 0.16 IU/ML
QUANTIFERON TB2 TUBE: 0.15

## 2021-10-31 LAB
GAMMA INTERFERON BACKGROUND BLD IA-ACNC: 0.27 IU/ML
M TB IFN-G BLD-IMP: NEGATIVE
M TB IFN-G CD4+ BCKGRND COR BLD-ACNC: 6.12 IU/ML
MITOGEN IGNF BCKGRD COR BLD-ACNC: -0.11 IU/ML
MITOGEN IGNF BCKGRD COR BLD-ACNC: -0.12 IU/ML

## 2021-11-01 NOTE — RESULT ENCOUNTER NOTE
Dear Damian,     Your labs show slightly elevated kidney marker called creatinine. You have had this mildly elevated in the past as well, and it has been fluctuating. I would recommend avoiding any NSAIDs such as ibuprofen, aleve, indomethacin, naproxen, etc. Marker of inflammation (CRP) is now in the normal range. Hepatitis and TB tests are negative. I see that you were not able to get your x-rays done. If you could please make an appointment to get your foot x-rays and chest x-ray done, that would be great. We can start methotrexate after I have the chest x-ray result.     Please let us know if you have any questions or concerns.    Regards,  Miranda Santoro MD

## 2021-11-03 ENCOUNTER — ANCILLARY PROCEDURE (OUTPATIENT)
Dept: GENERAL RADIOLOGY | Facility: CLINIC | Age: 61
End: 2021-11-03
Attending: STUDENT IN AN ORGANIZED HEALTH CARE EDUCATION/TRAINING PROGRAM
Payer: COMMERCIAL

## 2021-11-03 DIAGNOSIS — Z23 NEED FOR PROPHYLACTIC VACCINATION AND INOCULATION AGAINST INFLUENZA: ICD-10-CM

## 2021-11-03 PROCEDURE — 71046 X-RAY EXAM CHEST 2 VIEWS: CPT | Mod: GC | Performed by: RADIOLOGY

## 2021-11-03 PROCEDURE — 73630 X-RAY EXAM OF FOOT: CPT | Mod: RT | Performed by: RADIOLOGY

## 2021-11-04 DIAGNOSIS — Z79.899 ENCOUNTER FOR LONG-TERM (CURRENT) USE OF HIGH-RISK MEDICATION: ICD-10-CM

## 2021-11-04 DIAGNOSIS — M19.90 INFLAMMATORY ARTHRITIS: Primary | ICD-10-CM

## 2021-11-04 RX ORDER — FOLIC ACID 1 MG/1
1 TABLET ORAL DAILY
Qty: 90 TABLET | Refills: 3 | Status: SHIPPED | OUTPATIENT
Start: 2021-11-04 | End: 2022-06-30

## 2021-11-04 NOTE — RESULT ENCOUNTER NOTE
Dear Damian,     Foot x-rays show mild degenerative changes over the first big toe joints on both sides. No joint damage was seen. Chest x-ray is negative. I will send methotrexate 7.5 mg (3 tabs) weekly and folic acid 1 mg daily prescriptions to your pharmacy. Please get labs in 2 weeks after starting methotrexate. Please let me know if any issues with methotrexate. If you decide to get COVID-19 booster vaccine, please hold methotrexate for 1 week after the vaccine. Hold methotrexate for any signs of infection.    Please let us know if you have any questions or concerns.    Regards,  Miranda Santoro MD

## 2021-12-29 DIAGNOSIS — Z79.899 ENCOUNTER FOR LONG-TERM (CURRENT) USE OF HIGH-RISK MEDICATION: ICD-10-CM

## 2021-12-29 DIAGNOSIS — M19.90 INFLAMMATORY ARTHRITIS: ICD-10-CM

## 2021-12-29 NOTE — TELEPHONE ENCOUNTER
Pending Prescriptions:                       Disp   Refills    methotrexate 2.5 MG tablet                12 tab*0            Sig: Take 3 tablets (7.5 mg) by mouth every 7 days           Labs in 2 weeks      Noa Reyez MA   Email: [email or web address]  [facility] - Rheumatology  Phone: [phone]  Fax: [phone]

## 2022-01-03 NOTE — TELEPHONE ENCOUNTER
Please inform patient that he is overdue for methotrexate monitoring labs. I have sent a temporary refill of 2 weeks. Please remind him to schedule labs ASAP for further refills.

## 2022-01-03 NOTE — TELEPHONE ENCOUNTER
methotrexate 2.5 MG tablet      Last Written Prescription Date:  11/4/2021  Last Fill Quantity: 12 tab,   # refills: 0  Last Office Visit: 10/28/2021  Future Office visit:  1/14/2022    CBC RESULTS: Recent Labs   Lab Test 10/28/21  1145   WBC 5.3   RBC 5.68   HGB 14.0   HCT 44.7   MCV 79   MCH 24.6*   MCHC 31.3*   RDW 13.9          Creatinine   Date Value Ref Range Status   10/28/2021 1.32 (H) 0.66 - 1.25 mg/dL Final   06/01/2021 1.24 0.66 - 1.25 mg/dL Final   ]    Liver Function Studies -   Recent Labs   Lab Test 10/28/21  1145   PROTTOTAL 8.2   ALBUMIN 3.9   BILITOTAL 0.6   ALKPHOS 104   AST 23   ALT 32       Routing refill request to provider for review/approval because:  DMARD medication.  - labs current

## 2022-01-04 NOTE — NURSING NOTE
"Damian Tejada's goals for this visit include:   Chief Complaint   Patient presents with     RECHECK     3 month follow-up, labs completed 1/5/22       PCP: Tawanna Mendez    Referring Provider:  No referring provider defined for this encounter.      Initial BP (!) 161/82 (BP Location: Left arm, Patient Position: Sitting)   Pulse 65   Temp 97.3  F (36.3  C) (Oral)   Wt 78.7 kg (173 lb 8 oz)   SpO2 100%   BMI 27.17 kg/m   Estimated body mass index is 27.17 kg/m  as calculated from the following:    Height as of 10/14/21: 1.702 m (5' 7\").    Weight as of this encounter: 78.7 kg (173 lb 8 oz).    Medication Reconciliation: complete    Do you need any medication refills at today's visit? none      TORI Lopez [facility]   "

## 2022-01-04 NOTE — TELEPHONE ENCOUNTER
Message sent to patient via [employer].   Noa Reyez MA   Email: [email or web address]  [facility] - Rheumatology  Phone: [phone]  Fax: [phone]

## 2022-01-05 ENCOUNTER — IMMUNIZATION (OUTPATIENT)
Dept: NURSING | Facility: CLINIC | Age: 62
End: 2022-01-05
Payer: COMMERCIAL

## 2022-01-05 ENCOUNTER — LAB (OUTPATIENT)
Dept: LAB | Facility: CLINIC | Age: 62
End: 2022-01-05

## 2022-01-05 DIAGNOSIS — M19.90 INFLAMMATORY ARTHRITIS: ICD-10-CM

## 2022-01-05 DIAGNOSIS — Z79.899 ENCOUNTER FOR LONG-TERM (CURRENT) USE OF HIGH-RISK MEDICATION: ICD-10-CM

## 2022-01-05 LAB
BASOPHILS # BLD AUTO: 0 10E3/UL (ref 0–0.2)
BASOPHILS NFR BLD AUTO: 1 %
EOSINOPHIL # BLD AUTO: 0.3 10E3/UL (ref 0–0.7)
EOSINOPHIL NFR BLD AUTO: 5 %
ERYTHROCYTE [DISTWIDTH] IN BLOOD BY AUTOMATED COUNT: 16.2 % (ref 10–15)
HCT VFR BLD AUTO: 42.5 % (ref 40–53)
HGB BLD-MCNC: 13.4 G/DL (ref 13.3–17.7)
LYMPHOCYTES # BLD AUTO: 1.4 10E3/UL (ref 0.8–5.3)
LYMPHOCYTES NFR BLD AUTO: 23 %
MCH RBC QN AUTO: 26 PG (ref 26.5–33)
MCHC RBC AUTO-ENTMCNC: 31.5 G/DL (ref 31.5–36.5)
MCV RBC AUTO: 83 FL (ref 78–100)
MONOCYTES # BLD AUTO: 0.5 10E3/UL (ref 0–1.3)
MONOCYTES NFR BLD AUTO: 9 %
NEUTROPHILS # BLD AUTO: 3.7 10E3/UL (ref 1.6–8.3)
NEUTROPHILS NFR BLD AUTO: 62 %
PLATELET # BLD AUTO: 224 10E3/UL (ref 150–450)
RBC # BLD AUTO: 5.15 10E6/UL (ref 4.4–5.9)
WBC # BLD AUTO: 6 10E3/UL (ref 4–11)

## 2022-01-05 PROCEDURE — 82040 ASSAY OF SERUM ALBUMIN: CPT

## 2022-01-05 PROCEDURE — 82565 ASSAY OF CREATININE: CPT

## 2022-01-05 PROCEDURE — 0004A PR COVID VAC PFIZER DIL RECON 30 MCG/0.3 ML IM: CPT

## 2022-01-05 PROCEDURE — 36415 COLL VENOUS BLD VENIPUNCTURE: CPT

## 2022-01-05 PROCEDURE — 84460 ALANINE AMINO (ALT) (SGPT): CPT

## 2022-01-05 PROCEDURE — 91300 PR COVID VAC PFIZER DIL RECON 30 MCG/0.3 ML IM: CPT

## 2022-01-05 PROCEDURE — 85025 COMPLETE CBC W/AUTO DIFF WBC: CPT

## 2022-01-05 PROCEDURE — 84450 TRANSFERASE (AST) (SGOT): CPT

## 2022-01-05 NOTE — LETTER
January 13, 2022      Damian Tejada  [address]  [place] [facility] [address]        Dear ,    We are writing to inform you of your test results.      Labs are stable. I will send a 90 day supply of methotrexate 7.5 mg weekly to your pharmacy. You will need repeat monitoring labs in 3 months.       Please let us know if you have any questions or concerns.       Regards,   Miranda Santoro MD       Resulted Orders   AST   Result Value Ref Range    AST 21 0 - 45 U/L   ALT   Result Value Ref Range    ALT 31 0 - 70 U/L   Albumin level   Result Value Ref Range    Albumin 3.3 (L) 3.4 - 5.0 g/dL   Creatinine   Result Value Ref Range    Creatinine 1.33 (H) 0.66 - 1.25 mg/dL    GFR Estimate 62 >60 mL/min/1.73m2      Comment:      Effective December 21, 2021 eGFRcr in adults is calculated using the 2021 CKD-EPI creatinine equation which includes age and gender (Jimy wade al., [place], DOI: 10.1056/SKUIok3764745)   CBC with platelets and differential   Result Value Ref Range    WBC Count 6.0 4.0 - 11.0 10e3/uL    RBC Count 5.15 4.40 - 5.90 10e6/uL    Hemoglobin 13.4 13.3 - 17.7 g/dL    Hematocrit 42.5 40.0 - 53.0 %    MCV 83 78 - 100 fL    MCH 26.0 (L) 26.5 - 33.0 pg    MCHC 31.5 31.5 - 36.5 g/dL    RDW 16.2 (H) 10.0 - 15.0 %    Platelet Count 224 150 - 450 10e3/uL    % Neutrophils 62 %    % Lymphocytes 23 %    % Monocytes 9 %    % Eosinophils 5 %    % Basophils 1 %    Absolute Neutrophils 3.7 1.6 - 8.3 10e3/uL    Absolute Lymphocytes 1.4 0.8 - 5.3 10e3/uL    Absolute Monocytes 0.5 0.0 - 1.3 10e3/uL    Absolute Eosinophils 0.3 0.0 - 0.7 10e3/uL    Absolute Basophils 0.0 0.0 - 0.2 10e3/uL

## 2022-01-06 DIAGNOSIS — Z79.899 ENCOUNTER FOR LONG-TERM (CURRENT) USE OF HIGH-RISK MEDICATION: ICD-10-CM

## 2022-01-06 DIAGNOSIS — M19.90 INFLAMMATORY ARTHRITIS: ICD-10-CM

## 2022-01-06 LAB
ALBUMIN SERPL-MCNC: 3.3 G/DL (ref 3.4–5)
ALT SERPL W P-5'-P-CCNC: 31 U/L (ref 0–70)
AST SERPL W P-5'-P-CCNC: 21 U/L (ref 0–45)
CREAT SERPL-MCNC: 1.33 MG/DL (ref 0.66–1.25)
GFR SERPL CREATININE-BSD FRML MDRD: 62 ML/MIN/1.73M2

## 2022-01-06 NOTE — PROGRESS NOTES
RHEUMATOLOGY  FOLLOW-UP NOTE    Chief Complaint:    Chief Complaint   Patient presents with     RECHECK     3 month follow-up, labs completed 1/5/22     Interval History:  -currently on methotrexate 7.5 mg weekly and folic acid 1 mg daily  -he reports significant improvement in his AM stiffness and pain  -he still reports having some pain over the L wrist but it is improved compared to before  -he feels that overall his pain is not bothering him significantly to the point of limiting any daily activities  -denies any new symptoms  -he is tolerating methotrexate well, denies any side effects  -no new symptoms  -denies any fevers, rashes, infections    HPI (per initial consult on 9/8/21):  Damian Tejada is a 61 year old male with pmhx psoriasis, CAD s/p PCI, HTN, is referred to rheumatology clinic for L wrist pain. He started having new onset L wrist pain beginning of Jan 2021 along with significant swelling which came on overnight. He denies any trauma or injury. Was seen by orthopedics at [facility] (Dr Tamara Guzman) in [place] and idea of gout or CPPD was entertained. Was started on steroid medication (he does not recall the name) for 3 weeks which provided improvement and resolution of swelling, but joint pain persisted. He reports developing pain all over his body since then, which gets better with hot weather but worse with cold weather. Has since developed pain in b/l wrists, knees, ankles, shoulders. Denies any hand pain. Pain improves with activity and worse with rest/immobility. Pain bothers him more than anything else. He reports some joint stiffness in AM which improves as the day goes on. Stiffness lasts around 15 minutes and improves with activity. He has not been taking anything for pain. He has tried ibuprofen and tylenol, neither provided relief. He occasionally takes indomethacin as needed. He finds indomethacin to be helpful and feels that pain recurs once he stops indomethacin. He feels his symptoms are  "much better than in January. He is able to do his daily activities but lifting things such as chairs etc causes wrist pain (L>R). R wrist feels normal. His worst joint is the L wrist.       He was diagnosed with psoriasis \"long time\" ago. He does not have any active psoriatic lesions. He reports he has not had symptoms related to psoriasis for a very long time. He has never been on treatment (systemic or topical) for psoriasis.      Denies fevers, chills, weight loss, night sweats, headaches, vision changes, jaw claudication, eye pain/redness, dry eyes, dry mouth, oral/nasal ulcers, sinusitis, epistaxis, hearing loss, rash, raynaud's, cough, SOB, pleurisy, chest pain, edema, heartburn, difficulty swallowing, abdominal pain, diarrhea, hematochezia, melena, dysuria, recurrent genital ulcers, back pain, enthesitis, dactylitis, numbness, weakness, tingling. No hx of IBD. No hx of blood clots. No hx of GI/ infections or tick bites.     Pertinent labs and imaging: (per chart review in [facility] and care everywhere)     Labs:   9/13/21: negative TSH, lyme, UPC, +UA with microscopic hematuria without RBCs, negative Hep C, C3, C4, uric acid 5.5  6/8/21: negative CBC w diff, +CLIFF 1:160 speckled, negative RF, CCP, +CRP 12.4, negative ESR, uric acid 5.6, negative ANCA, dsDNA, Sm, SSA, SSB, RNP        Medications:  Current Outpatient Medications   Medication Sig Dispense Refill     aspirin (ASA) 81 MG chewable tablet Take 1 tablet (81 mg) by mouth daily 100 tablet 4     atorvastatin (LIPITOR) 80 MG tablet Take 1 tablet (80 mg) by mouth daily Call clinic to schedule follow up appointment. 90 tablet 1     carvedilol (COREG) 12.5 MG tablet Take 1 tablet (12.5 mg) by mouth 2 times daily (with meals) 200 tablet 1     ezetimibe (ZETIA) 10 MG tablet Take 1 tablet (10 mg) by mouth daily 30 tablet 3     folic acid (FOLVITE) 1 MG tablet Take 1 tablet (1 mg) by mouth daily 90 tablet 3     indomethacin (INDOCIN) 25 MG capsule        lisinopril " (ZESTRIL) 5 MG tablet Take 1 tablet (5 mg) by mouth daily 30 tablet 3     metFORMIN (GLUCOPHAGE) 500 MG tablet Take 1 tablet (500 mg) by mouth 2 times daily (with meals) 60 tablet 1     methotrexate 2.5 MG tablet Take 3 tablets (7.5 mg) by mouth every 7 days Monitoring labs in 12 weeks for further refills 36 tablet 0       PHYSICAL EXAMINATION:   Vital signs:  BP (!) 161/82 (BP Location: Left arm, Patient Position: Sitting)   Pulse 65   Temp 97.3  F (36.3  C) (Oral)   Wt 78.7 kg (173 lb 8 oz)   SpO2 100%   BMI 27.17 kg/m      MSK: +tenderness over dorsal aspect of the L hand over 3rd and 4th extensor digitorum tendons, no synovitis appreciated, decreased ROM of L wrist with both flexion and extension, no joint effusions/erythema/warmth, no nailfold capillary abnormalities, no nail pitting, no fingertip ulcerations, +trace pitting edema b/l, strength 5/5 throughout, no dactylitis, no enthesitis, no spinal tenderness, ROM in neck intact, no SI joint tenderness  Skin: no rashes, no psoriatic lesions appreciated  HEENT: MMM, no oral ulcers/lesions  CV: RRR  Pulm: CTAB, non-labored respirations, no c/r/w  Neuro: A&O x3, no focal deficits      Labs:      I have reviewed all pertinent investigations including labs, including outside records if relevant    RF/CCP  Recent Labs   Lab Test 06/01/21  1016   CCPIGG 1   RHF <7     CLIFF  Recent Labs   Lab Test 06/01/21  1016   DEMETRIA Positive*   ANAP1 SPECKLED   ANAT1 1:160     RNP/Sm/SSA/SSB  Recent Labs   Lab Test 06/08/21  1324   RNPIGG 0.2   SMIGG <0.2   SSAIGG <0.2   SSBIGG <0.2     dsDNA  Recent Labs   Lab Test 06/08/21  1324   DNA 1     C3/C4  Recent Labs   Lab Test 09/13/21  1145   D3OEIIN 117   [id number] 35     ANCA  Recent Labs   Lab Test 06/08/21  1324   ANCAT <1:10   ANCAP The ANCA IFA is <1:10.  No further testing will be performed.     CBC  Recent Labs   Lab Test 06/01/21  1016 01/29/19  1714 12/21/16  1307 08/15/16  0625 08/14/16  1200   WBC 6.1 4.6 9.3   < > 7.8    RBC 5.80 5.90 3.90*   < > 5.51   HGB 14.5 14.6 10.1*   < > 14.6   HCT 46.1 47.3 32.3*   < > 44.9   MCV 80 80 83   < > 82   RDW 14.2 13.5 14.1   < > 13.2    289 234   < > 180   MCH 25.0* 24.7* 25.9*   < > 26.5   MCHC 31.5 30.9* 31.3*   < > 32.5   NEUTROPHIL 61.0  --  80.9  --  52.9   LYMPH 25.0  --  13.8  --  38.1   MONOCYTE 8.6  --  4.9  --  5.8   EOSINOPHIL 3.9  --  0.0  --  2.1   BASOPHIL 1.2  --  0.2  --  0.8   ANEU 3.7  --  7.5  --  4.1   ALYM 1.5  --  1.3  --  3.0   MARITZA 0.5  --  0.5  --  0.5   AEOS 0.2  --  0.0  --  0.2   ABAS 0.1  --  0.0  --  0.1    < > = values in this interval not displayed.     CMP  Recent Labs   Lab Test 06/01/21  1016 01/29/19  1714 12/21/16  1307 12/13/16  1758 08/15/16  0625 08/15/16  0625 08/14/16  1201 08/14/16  1200    138 143 144   < > 139   < > 141   POTASSIUM 3.7 4.2 4.7 4.6   < > 3.8   < > 3.6   CHLORIDE 106 104 109 108   < > 106   < > 108   CO2 27 29 24 30   < > 23   < > 20   ANIONGAP 8 5 10 6   < > 9   < > 13   * 119* 165* 100*  --  143*  --  164*   BUN 14 11 41* 21   < > 13   < > 14   CR 1.24 1.26* 1.41* 1.51*   < > 1.06   < > 1.12   GFRESTIMATED 63 63 52* 48*   < > 73   < > 68   GFRESTBLACK 73 73 63 58*   < > 88   < > 83   DEBORAH 9.4 9.3 8.7 8.8   < > 8.5   < > 9.1   BILITOTAL 0.3  --  0.2  --   --   --   --  0.7   ALBUMIN 3.6  --  3.8  --   --   --   --  3.5   PROTTOTAL 7.9  --  6.9  --   --   --   --  7.3   ALKPHOS 112  --  53  --   --   --   --  75   AST 14  --  18  --   --   --   --  14   ALT 22  --  31  --   --   --   --  24    < > = values in this interval not displayed.     HgA1c  Recent Labs   Lab Test 06/01/21  1016 01/29/19  1714   A1C 6.3* 6.3*     Uric Acid  Recent Labs   Lab Test 09/13/21  1145 06/01/21  1016   URIC 5.5 5.6     Calcium/VitaminD  Recent Labs   Lab Test 06/01/21  1016 01/29/19  1714 12/21/16  1307   DEBORAH 9.4 9.3 8.7     ESR/CRP  Recent Labs   Lab Test 06/01/21  1016   SED 20   CRP 12.4*     TSH/T4  Recent Labs   Lab Test  09/13/21  1145   TSH 1.63     Lipid Panel  Recent Labs   Lab Test 06/01/21  1016 01/29/19  1714 10/26/16  1100 04/30/14  1124 04/30/14  1124   CHOL 214* 231* 169   < > 264*   TRIG 194* 184* 102   < > 205*   HDL 53 61 65   < > 62   * 134* 84   < > 161*   VLDL  --   --   --   --  41*   CHOLHDLRATIO  --   --   --   --  4.2   NHDL 160* 170* 104  --   --     < > = values in this interval not displayed.     Hepatitis C  Recent Labs   Lab Test 09/13/21  1145 08/23/16  1125   HCVAB Nonreactive Nonreactive   Assay performance characteristics have not been established for newborns,   infants, and children       UA  Recent Labs   Lab Test 09/13/21  1145 12/17/15  1600 04/30/14  1118   COLOR Yellow Yellow Light Yellow   APPEARANCE Clear Clear Clear   URINEGLC Negative Negative Negative   URINEBILI Negative Negative Negative   SG 1.013 1.016 1.011   URINEPH 5.0 5.5 5.5   PROTEIN Negative Negative Negative   NITRITE Negative Negative Negative   UBLD Small* Negative Trace*   LEUKEST Negative Negative Negative   WBCU <1 <1 <1   RBCU <1 1 <1   MUCOUS  --  Present* Present*     Urine Microscopic  Recent Labs   Lab Test 09/13/21  1145 12/17/15  1600 04/30/14  1118   WBCU <1 <1 <1   RBCU <1 1 <1   MUCOUS  --  Present* Present*     Urine Protein  Recent Labs   Lab Test 09/13/21  1145   UTP 0.10   UTPG 0.10   UCRR 99     Imaging:   I have reviewed all pertinent investigations including imaging, including outside records if relevant    IMPRESSION:  1. Constellation of imaging findings concerning for inflammatory  arthritis with multiple edema-like marrow signal intensities, likely  reflecting reactive osteitis.  2. Extensor carpi ulnaris tendinosis with mild tenosynovitis and  subsheath injury.  3. At least partial tear of the central portion of the triangular  fibrocartilage disc proper, possible full-thickness.  4. Scapholunate interosseous ligament membranous portion likely  degeneration.     XR b/l wrist (6/1/21)   Impression:    1. Mild soft tissue swelling of the left wrist without acute osseous   abnormality.   2. No bony erosion or other substantial degenerative change.     Assessment / Plan:    Damian Tejada is a 61 year old male with pmhx psoriasis, CAD s/p PCI, HTN, is presenting for follow-up evaluation of inflammatory arthritis. Has chronic L wrist pain that started in January 2021 with sudden onset swelling. Per patient, he was seen by orthopedics and started on steroids x 3 weeks with resolution of swelling. I do not have orthopedics records from [facility] today. Any prior notes, outside records, laboratory results, and imaging studies were reviewed if relevant. Pertinent work-up thus far includes negative CBC w diff, +CLIFF 1:160 speckled, negative RF, CCP, +CRP 12.4, negative ESR, uric acid 5.6, negative ANCA, dsDNA, Sm, SSA, SSB, RNP.  Apart from L wrist pain, he also has pain over b/l knees, shoulders and ankles which is not as severe as the L wrist. He also reports some stiffness which lasts around 10-15 minutes. His pain improves with activity. His pain overall has improved since onset of symptoms in January 2021 and does not affect his daily activities. He has not had any recurrent swelling and has never had swelling over any other joints. He reports being diagnosed with psoriasis many years ago. Denies being on any treatment, no active psoriatic lesions. Denies any hx of inflammatory eye disease, IBD, no dactylitis, enthesis, no back pain. I obtained MRI of his L wrist which shows findings suggestive of an underlying inflammatory arthritis. Ddx include seronegative SpA such as PsA vs seronegative RA. No overt synovitis of L wrist but decreased ROM with flexion/extension appreciated. Since initiation of methotrexate 7.5 mg weekly, he has felt well overall and noticed a considerable improvement in his arthralgia and joint stiffness. He continues to have mild L wrist pain which he describes as pain over the mid-area of the hand (both  dorsal and palmar aspects). No synovitis on exam today. He is overall doing well and is happy with current treatment regimen.     1) Inflammatory arthritis -seronegative RA vs seronegative SpA (PsA)  -plain films of b/l wrists negative for inflammatory findings/erosions  -MRI L wrist with findings suggestive of inflammatory arthritis   -obtain CBC w diff, CMP, Hep B and TB quant, CRP, CXR, plain films of b/l foot  -negative Hep C 9/13/21  -currently on methotrexate 7.5 mg weekly (reduced dose due to CKD), continue. Continue folic acid 1 mg daily  -last methotrexate monitoring labs 1/5/22 stable. Next monitoring labs around 4/5/22  -can use topical diclofenac gel at bedtime over the L hand for pain relief    2) +CLIFF 1:160  -negative SSA, SSB, Sm, RNP, dsDNA, C3, C4    3) CKD  -recommend follow-up with PCP  -prior UA without any proteinuria or RBCs    4) Microscopic hematuria  -noted on recent UA, no RBCs  -recommend follow-up with PCP    5) HTN, asymptomatic   -did not take his lisinopril this AM  -he will take his lisinopril once he gets home. He goes to [facility] to check BP. Advised him to contact PCP if BP remains elevated    6) Immunizations:  -Discussed importance of staying up-to-date on immunizations especially while on immunosuppressive therapy  -PCV13 10/28/21  -PPSV23 today  -Influenza 10/28/21  -advised patient to get shingrix vaccine at PCP office  -COVID-19 4/5/21, 4/26/21, 1/5/22  -Discussed importance of taking/continuing standard precautionary measures such as hand hygiene, social distancing, wearing a mask, and avoiding sick contacts         Mr. Tejada verbalized agreement with and understanding of the rationale for the diagnosis and treatment plan.  All questions were answered to best of my ability and the patient's satisfaction. Mr. Tejada was advised to contact the clinic with any questions that may arise after the clinic visit.        Chart documentation done in part with Dragon Voice recognition  Software. Although reviewed after completion, some word and grammatical error may remain.      RTC 3 months, prefers [place] location due to proximity     Miranda Santoro MD

## 2022-01-06 NOTE — RESULT ENCOUNTER NOTE
Dear Damian,     Labs are stable. I will send a 90 day supply of methotrexate 7.5 mg weekly to your pharmacy. You will need repeat monitoring labs in 3 months.     Please let us know if you have any questions or concerns.    Regards,  Miranda Santoro MD

## 2022-01-11 NOTE — TELEPHONE ENCOUNTER
MESSAGE WAS SEEN AND READ BY PATIENT.       The refill request made by your pharmacy was received at the clinic.      Signed Prescriptions:                        Disp   Refills    methotrexate 2.5 MG tablet                 6 tabl*0        Sig: Take 3 tablets (7.5 mg) by mouth every 7 days for 14           days Need monitoring labs prior to further           refills  Authorizing Provider: SANDRA BELLE        At this time you are due for some monitoring labs.  A two week valentin refill has been sent to your pharmacy.         Please call your clinic to make an appointment with lab before you run out of medication. This will prevent a delay in your next month's refill.     [facility] [phone]  [facility]- [phone]  [facility] [phone]        Sincerely,        [facility]/ Rheumatology Care Team    Last read by Damian Tejada at 11:43 PM on 1/4/2022.

## 2022-01-14 ENCOUNTER — OFFICE VISIT (OUTPATIENT)
Dept: RHEUMATOLOGY | Facility: CLINIC | Age: 62
End: 2022-01-14
Payer: COMMERCIAL

## 2022-01-14 VITALS
DIASTOLIC BLOOD PRESSURE: 82 MMHG | BODY MASS INDEX: 27.17 KG/M2 | OXYGEN SATURATION: 100 % | TEMPERATURE: 97.3 F | HEART RATE: 65 BPM | SYSTOLIC BLOOD PRESSURE: 161 MMHG | WEIGHT: 173.5 LBS

## 2022-01-14 DIAGNOSIS — M19.90 INFLAMMATORY ARTHRITIS: ICD-10-CM

## 2022-01-14 DIAGNOSIS — Z79.899 ENCOUNTER FOR LONG-TERM (CURRENT) USE OF HIGH-RISK MEDICATION: ICD-10-CM

## 2022-01-14 DIAGNOSIS — Z23 ENCOUNTER FOR IMMUNIZATION: Primary | ICD-10-CM

## 2022-01-14 PROCEDURE — 90471 IMMUNIZATION ADMIN: CPT | Performed by: STUDENT IN AN ORGANIZED HEALTH CARE EDUCATION/TRAINING PROGRAM

## 2022-01-14 PROCEDURE — 99214 OFFICE O/P EST MOD 30 MIN: CPT | Mod: 25 | Performed by: STUDENT IN AN ORGANIZED HEALTH CARE EDUCATION/TRAINING PROGRAM

## 2022-01-14 PROCEDURE — 90732 PPSV23 VACC 2 YRS+ SUBQ/IM: CPT | Performed by: STUDENT IN AN ORGANIZED HEALTH CARE EDUCATION/TRAINING PROGRAM

## 2022-01-14 NOTE — PATIENT INSTRUCTIONS
1. Continue methotrexate 7.5 mg weekly. Continue folic acid 1 mg daily  2. Please make an appointment to get methotrexate monitoring labs around 4/1/22 for refill  3. Can use over-the-counter topical diclofenac gel over the left wrist for pain relief. Can use it at bedtime  4. Follow-up 3 months

## 2022-03-09 ENCOUNTER — LAB (OUTPATIENT)
Dept: LAB | Facility: CLINIC | Age: 62
End: 2022-03-09
Payer: COMMERCIAL

## 2022-03-09 DIAGNOSIS — Z79.899 ENCOUNTER FOR LONG-TERM (CURRENT) USE OF HIGH-RISK MEDICATION: ICD-10-CM

## 2022-03-09 DIAGNOSIS — M19.90 INFLAMMATORY ARTHRITIS: ICD-10-CM

## 2022-03-09 LAB
BASOPHILS # BLD AUTO: 0 10E3/UL (ref 0–0.2)
BASOPHILS NFR BLD AUTO: 1 %
EOSINOPHIL # BLD AUTO: 0.3 10E3/UL (ref 0–0.7)
EOSINOPHIL NFR BLD AUTO: 5 %
ERYTHROCYTE [DISTWIDTH] IN BLOOD BY AUTOMATED COUNT: 15.5 % (ref 10–15)
HCT VFR BLD AUTO: 39.7 % (ref 40–53)
HGB BLD-MCNC: 12.8 G/DL (ref 13.3–17.7)
LYMPHOCYTES # BLD AUTO: 1.7 10E3/UL (ref 0.8–5.3)
LYMPHOCYTES NFR BLD AUTO: 31 %
MCH RBC QN AUTO: 26.8 PG (ref 26.5–33)
MCHC RBC AUTO-ENTMCNC: 32.2 G/DL (ref 31.5–36.5)
MCV RBC AUTO: 83 FL (ref 78–100)
MONOCYTES # BLD AUTO: 0.4 10E3/UL (ref 0–1.3)
MONOCYTES NFR BLD AUTO: 8 %
NEUTROPHILS # BLD AUTO: 3.1 10E3/UL (ref 1.6–8.3)
NEUTROPHILS NFR BLD AUTO: 56 %
PLATELET # BLD AUTO: 209 10E3/UL (ref 150–450)
RBC # BLD AUTO: 4.77 10E6/UL (ref 4.4–5.9)
WBC # BLD AUTO: 5.6 10E3/UL (ref 4–11)

## 2022-03-09 PROCEDURE — 85025 COMPLETE CBC W/AUTO DIFF WBC: CPT

## 2022-03-09 PROCEDURE — 82040 ASSAY OF SERUM ALBUMIN: CPT

## 2022-03-09 PROCEDURE — 84460 ALANINE AMINO (ALT) (SGPT): CPT

## 2022-03-09 PROCEDURE — 82565 ASSAY OF CREATININE: CPT

## 2022-03-09 PROCEDURE — 84450 TRANSFERASE (AST) (SGOT): CPT

## 2022-03-09 PROCEDURE — 36415 COLL VENOUS BLD VENIPUNCTURE: CPT

## 2022-03-09 PROCEDURE — 86140 C-REACTIVE PROTEIN: CPT

## 2022-03-10 LAB
ALBUMIN SERPL-MCNC: 3.6 G/DL (ref 3.4–5)
ALT SERPL W P-5'-P-CCNC: 34 U/L (ref 0–70)
AST SERPL W P-5'-P-CCNC: 13 U/L (ref 0–45)
CREAT SERPL-MCNC: 1.28 MG/DL (ref 0.66–1.25)
CRP SERPL-MCNC: 7.5 MG/L (ref 0–8)
GFR SERPL CREATININE-BSD FRML MDRD: 64 ML/MIN/1.73M2

## 2022-04-23 ENCOUNTER — HEALTH MAINTENANCE LETTER (OUTPATIENT)
Age: 62
End: 2022-04-23

## 2022-05-16 NOTE — TELEPHONE ENCOUNTER
This writer attempted to contact PATIENT on 01/03/22    Reason for call rx sent, but need monitoring labs  and left message.    If patient calls back:   Schedule Lab appointment within 2 business days with lab, document that pt called and close encounter     Noa Reyez MA   Email: [email or web address]  [facility] - Rheumatology  Phone: [phone]  Fax: [phone]         Spine appears normal, range of motion is not limited, no muscle or joint tenderness

## 2022-06-13 DIAGNOSIS — I25.10 CORONARY ARTERY DISEASE INVOLVING NATIVE CORONARY ARTERY OF NATIVE HEART WITHOUT ANGINA PECTORIS: ICD-10-CM

## 2022-06-14 RX ORDER — ATORVASTATIN CALCIUM 80 MG/1
80 TABLET, FILM COATED ORAL DAILY
Qty: 90 TABLET | Refills: 0 | Status: SHIPPED | OUTPATIENT
Start: 2022-06-14 | End: 2022-06-30

## 2022-06-14 NOTE — TELEPHONE ENCOUNTER
Last Clinic Visit: 10/14/2021  [facility] [place]  Recommended 1 month follow up  No upcoming appointments  Overdue for LDL  Lab Test 06/01/21  1016   *     90 day refill provided, routed to clinic for follow up

## 2022-06-15 ENCOUNTER — TELEPHONE (OUTPATIENT)
Dept: INTERNAL MEDICINE | Facility: CLINIC | Age: 62
End: 2022-06-15
Payer: COMMERCIAL

## 2022-06-30 ENCOUNTER — OFFICE VISIT (OUTPATIENT)
Dept: INTERNAL MEDICINE | Facility: CLINIC | Age: 62
End: 2022-06-30
Payer: COMMERCIAL

## 2022-06-30 VITALS
SYSTOLIC BLOOD PRESSURE: 143 MMHG | HEIGHT: 67 IN | BODY MASS INDEX: 26.48 KG/M2 | RESPIRATION RATE: 16 BRPM | WEIGHT: 168.7 LBS | OXYGEN SATURATION: 97 % | HEART RATE: 75 BPM | DIASTOLIC BLOOD PRESSURE: 84 MMHG

## 2022-06-30 DIAGNOSIS — M19.90 INFLAMMATORY ARTHRITIS: ICD-10-CM

## 2022-06-30 DIAGNOSIS — I25.10 CORONARY ARTERY DISEASE INVOLVING NATIVE CORONARY ARTERY OF NATIVE HEART WITHOUT ANGINA PECTORIS: ICD-10-CM

## 2022-06-30 DIAGNOSIS — Z23 ENCOUNTER FOR ADMINISTRATION OF COVID-19 VACCINE: Primary | ICD-10-CM

## 2022-06-30 DIAGNOSIS — N18.31 STAGE 3A CHRONIC KIDNEY DISEASE (H): ICD-10-CM

## 2022-06-30 DIAGNOSIS — E78.5 HYPERLIPIDEMIA LDL GOAL <70: ICD-10-CM

## 2022-06-30 DIAGNOSIS — Z79.899 ENCOUNTER FOR LONG-TERM (CURRENT) USE OF HIGH-RISK MEDICATION: ICD-10-CM

## 2022-06-30 DIAGNOSIS — R73.03 PRE-DIABETES: ICD-10-CM

## 2022-06-30 DIAGNOSIS — I10 ESSENTIAL HYPERTENSION: ICD-10-CM

## 2022-06-30 PROCEDURE — 0064A COVID-19,PF,MODERNA (18+ YRS BOOSTER .25ML): CPT

## 2022-06-30 PROCEDURE — 91306 COVID-19,PF,MODERNA (18+ YRS BOOSTER .25ML): CPT

## 2022-06-30 PROCEDURE — 99214 OFFICE O/P EST MOD 30 MIN: CPT | Mod: 25

## 2022-06-30 RX ORDER — EZETIMIBE 10 MG/1
10 TABLET ORAL DAILY
Qty: 30 TABLET | Refills: 3 | Status: SHIPPED | OUTPATIENT
Start: 2022-06-30 | End: 2022-11-10

## 2022-06-30 RX ORDER — CARVEDILOL 12.5 MG/1
12.5 TABLET ORAL 2 TIMES DAILY WITH MEALS
Qty: 200 TABLET | Refills: 1 | Status: SHIPPED | OUTPATIENT
Start: 2022-06-30 | End: 2022-11-10

## 2022-06-30 RX ORDER — FOLIC ACID 1 MG/1
1 TABLET ORAL DAILY
Qty: 90 TABLET | Refills: 3 | Status: SHIPPED | OUTPATIENT
Start: 2022-06-30 | End: 2023-10-24

## 2022-06-30 RX ORDER — ATORVASTATIN CALCIUM 80 MG/1
80 TABLET, FILM COATED ORAL DAILY
Qty: 90 TABLET | Refills: 0 | Status: SHIPPED | OUTPATIENT
Start: 2022-06-30 | End: 2022-11-10

## 2022-06-30 RX ORDER — LISINOPRIL 5 MG/1
5 TABLET ORAL DAILY
Qty: 30 TABLET | Refills: 3 | Status: SHIPPED | OUTPATIENT
Start: 2022-06-30 | End: 2022-11-10

## 2022-06-30 ASSESSMENT — PAIN SCALES - GENERAL: PAINLEVEL: NO PAIN (0)

## 2022-06-30 NOTE — NURSING NOTE
Chief Complaint   Patient presents with     Recheck Medication     Ran out of medication 2 months ago per patient.     Hypertension       Gage Hernandez, EMT at 1:51 PM on 6/30/2022.

## 2022-06-30 NOTE — PROGRESS NOTES
"                     PRIMARY CARE CENTER       SUBJECTIVE:  Damian Tejada is a 62 year old male with a PMHx of  psoriasis, CAD (STEMI)  on 8/14/16 s/p NATY x2 to the proximal LAD, and HTN who comes in for medication refill. Patient states he ran out of all of his medications about 2-3 months ago. Patient is doing well, no complains at this time. I discussed with patient the importance of being compliant with his medications and diet. 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 I educated the patient on the restriction of salt intake, to reduce fast food, and to eat home cooked meals. I also advised patient to purchase a blood pressure cuff machine and to keep a daily log of his BP. I reminded the patient to bring them in at the next visit along with the BP cuff machine. I recommended the patient to follow up with a cardiologist given his cardiac history, patient refused at this time.       Medications and allergies reviewed by me today.     ROS:   Constitutional, neuro, ENT, endocrine, pulmonary, cardiac, gastrointestinal, genitourinary, musculoskeletal, integument and psychiatric systems are negative, except as otherwise noted.    OBJECTIVE:    BP (!) 143/84 (BP Location: Right arm, Patient Position: Sitting, Cuff Size: Adult Large)   Pulse 75   Resp 16   Ht 1.702 m (5' 7\")   Wt 76.5 kg (168 lb 11.2 oz)   SpO2 97%   BMI 26.42 kg/m     Wt Readings from Last 1 Encounters:   06/30/22 76.5 kg (168 lb 11.2 oz)       GENERAL APPEARANCE: healthy, alert and no distress     EYES: EOMI,  PERRL     HENT: ear canals and TM's normal and nose and mouth without ulcers or lesions     NECK: no adenopathy, no asymmetry, masses, or scars and thyroid normal to palpation     RESP: lungs clear to auscultation - no rales, rhonchi or wheezes     CV: regular rates and rhythm, normal S1 S2, + systolic murmur     ABDOMEN:  soft, nontender, no HSM or masses and bowel sounds normal     MS: extremities normal- " no gross deformities noted, no evidence of inflammation in joints, FROM in all extremities.     SKIN: no suspicious lesions or rashes     NEURO: Normal strength and tone, sensory exam grossly normal, mentation intact and speech normal     PSYCH: mentation appears normal. and affect normal/bright     LYMPHATICS: No cervical adenopathy     ASSESSMENT/PLAN:    Uncontrolled HTN:   - continue Lisinopril 5 mg every day, Coreg 12.5 mg BID  - Restricted salt diet, improve diet  - Pt to f/u in 2-4 weeks for BP recheck  - Instructed patient to record a daily BP log, bring it to clinic at next visit along with BP cuff.     CAD (STEMI)    - on 8/14/16 s/p NATY x2 to the proximal LAD  - Recommended patient to follow up with cardiologist, he refuses to at this point.     CKD:   - Most recent Cr 1.28, GFR 59  - BMP ordered, review at next visit    Hyperlipidemia:  - most recent lipid panel 6/1/2021: Chol 214, Triglycerides 194, , HDL 53.   - Lipid panel ordered, review at next visit  - Educated patient on low cholesterol diet    Pre-diabetes:   - most recent A1C (6/1/2021) 6.3   - Continue Metformin 500 mg BID  - A1C ordered, review at next visit    Patient received the 4th Moderna booster for COVID-19 prevention today given his high risk exposure as a .       Pt should return to clinic for f/u with me in 2-4 weeks    Brice Mckeon DO  PGY1  Jun 30, 2022    Pt was seen and plan of care discussed with Dr. Romero

## 2022-07-01 ENCOUNTER — LAB (OUTPATIENT)
Dept: LAB | Facility: CLINIC | Age: 62
End: 2022-07-01
Payer: COMMERCIAL

## 2022-07-01 DIAGNOSIS — E78.5 HYPERLIPIDEMIA LDL GOAL <70: ICD-10-CM

## 2022-07-01 DIAGNOSIS — M19.90 INFLAMMATORY ARTHRITIS: ICD-10-CM

## 2022-07-01 DIAGNOSIS — I25.10 CORONARY ARTERY DISEASE INVOLVING NATIVE CORONARY ARTERY OF NATIVE HEART WITHOUT ANGINA PECTORIS: ICD-10-CM

## 2022-07-01 DIAGNOSIS — R73.03 PRE-DIABETES: ICD-10-CM

## 2022-07-01 DIAGNOSIS — N18.31 STAGE 3A CHRONIC KIDNEY DISEASE (H): ICD-10-CM

## 2022-07-01 LAB
ANION GAP SERPL CALCULATED.3IONS-SCNC: <1 MMOL/L (ref 3–14)
BUN SERPL-MCNC: 16 MG/DL (ref 7–30)
CALCIUM SERPL-MCNC: 9.5 MG/DL (ref 8.5–10.1)
CHLORIDE BLD-SCNC: 114 MMOL/L (ref 94–109)
CHOLEST SERPL-MCNC: 168 MG/DL
CO2 SERPL-SCNC: 25 MMOL/L (ref 20–32)
CREAT SERPL-MCNC: 1.09 MG/DL (ref 0.66–1.25)
ERYTHROCYTE [DISTWIDTH] IN BLOOD BY AUTOMATED COUNT: 13.3 % (ref 10–15)
FASTING STATUS PATIENT QL REPORTED: YES
GFR SERPL CREATININE-BSD FRML MDRD: 78 ML/MIN/1.73M2
GLUCOSE BLD-MCNC: 118 MG/DL (ref 70–99)
HBA1C MFR BLD: 6.2 % (ref 0–5.6)
HCT VFR BLD AUTO: 43.8 % (ref 40–53)
HDLC SERPL-MCNC: 59 MG/DL
HGB BLD-MCNC: 13.4 G/DL (ref 13.3–17.7)
LDLC SERPL CALC-MCNC: 99 MG/DL
MCH RBC QN AUTO: 25.1 PG (ref 26.5–33)
MCHC RBC AUTO-ENTMCNC: 30.6 G/DL (ref 31.5–36.5)
MCV RBC AUTO: 82 FL (ref 78–100)
NONHDLC SERPL-MCNC: 109 MG/DL
PLATELET # BLD AUTO: 253 10E3/UL (ref 150–450)
POTASSIUM BLD-SCNC: 3.9 MMOL/L (ref 3.4–5.3)
RBC # BLD AUTO: 5.33 10E6/UL (ref 4.4–5.9)
SODIUM SERPL-SCNC: 139 MMOL/L (ref 133–144)
TRIGL SERPL-MCNC: 50 MG/DL
WBC # BLD AUTO: 6.4 10E3/UL (ref 4–11)

## 2022-07-01 PROCEDURE — 80061 LIPID PANEL: CPT | Performed by: PATHOLOGY

## 2022-07-01 PROCEDURE — 80048 BASIC METABOLIC PNL TOTAL CA: CPT | Performed by: PATHOLOGY

## 2022-07-01 PROCEDURE — 83036 HEMOGLOBIN GLYCOSYLATED A1C: CPT | Performed by: PATHOLOGY

## 2022-07-01 PROCEDURE — 85027 COMPLETE CBC AUTOMATED: CPT | Performed by: PATHOLOGY

## 2022-07-01 PROCEDURE — 36415 COLL VENOUS BLD VENIPUNCTURE: CPT | Performed by: PATHOLOGY

## 2022-07-14 ENCOUNTER — OFFICE VISIT (OUTPATIENT)
Dept: INTERNAL MEDICINE | Facility: CLINIC | Age: 62
End: 2022-07-14
Payer: COMMERCIAL

## 2022-07-14 VITALS
BODY MASS INDEX: 26.76 KG/M2 | RESPIRATION RATE: 16 BRPM | OXYGEN SATURATION: 98 % | HEART RATE: 55 BPM | SYSTOLIC BLOOD PRESSURE: 136 MMHG | WEIGHT: 170.5 LBS | DIASTOLIC BLOOD PRESSURE: 81 MMHG | HEIGHT: 67 IN

## 2022-07-14 DIAGNOSIS — I10 ESSENTIAL HYPERTENSION: Primary | ICD-10-CM

## 2022-07-14 PROCEDURE — 99213 OFFICE O/P EST LOW 20 MIN: CPT | Mod: GC

## 2022-07-14 ASSESSMENT — PAIN SCALES - GENERAL: PAINLEVEL: NO PAIN (0)

## 2022-07-14 NOTE — NURSING NOTE
Chief Complaint   Patient presents with     Recheck Medication       CHAR Gonzalez at 2:09 PM on 7/14/2022.

## 2022-07-14 NOTE — PROGRESS NOTES
"                     PRIMARY CARE CENTER       SUBJECTIVE:  Damian Tejada is a 62 year old male with a PMHx of psoriasis, CAD (STEMI)  on 8/14/16 s/p NATY x2 to the proximal LAD, and HTN who comes in for BP recheck s/p recent refill on his BP meds. BP has improved, currently 138/81. Pt denies any fever, chills, chest pain, shortness of breath, abdominal pain, urinary symptoms. 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     Medications and allergies reviewed by me today.     ROS:   Constitutional, neuro, ENT, endocrine, pulmonary, cardiac, gastrointestinal, genitourinary, musculoskeletal, integument and psychiatric systems are negative, except as otherwise noted.    OBJECTIVE:    /81 (BP Location: Left arm, Patient Position: Sitting, Cuff Size: Adult Regular)   Pulse 55   Resp 16   Ht 1.702 m (5' 7\")   Wt 77.3 kg (170 lb 8 oz)   SpO2 98%   BMI 26.70 kg/m     Wt Readings from Last 1 Encounters:   07/14/22 77.3 kg (170 lb 8 oz)       GENERAL APPEARANCE: healthy, alert and no distress     EYES: EOMI,  PERRL     HENT: ear canals and TM's normal and nose and mouth without ulcers or lesions     NECK: no adenopathy, no asymmetry, masses, or scars and thyroid normal to palpation     RESP: lungs clear to auscultation - no rales, rhonchi or wheezes     CV: regular rates and rhythm, normal S1 S2, no S3 or S4 and no murmur, click or rub     ABDOMEN:  soft, nontender, no HSM or masses and bowel sounds normal     MS: extremities normal- no gross deformities noted, no evidence of inflammation in joints, FROM in all extremities.     SKIN: no suspicious lesions or rashes     NEURO: Normal strength and tone, sensory exam grossly normal, mentation intact and speech normal     PSYCH: mentation appears normal. and affect normal/bright     LYMPHATICS: No cervical adenopathy     ASSESSMENT/PLAN:    Uncontrolled HTN: improved  - continue Lisinopril 5 mg every day, Coreg 12.5 mg BID  - Continue Restricted salt " diet, improve diet  - BP lo/1: 120/67, : 143/83, : 119/68, : 122/70 taken at work in the afternoon.       CAD (STEMI)    - on 16 s/p NATY x2 to the proximal LAD  - Recommended patient to follow up with cardiologist, he refuses to at this point.      CKD: improved  - BMP reviewed, Cr has improved, wnl     Hyperlipidemia: improved  - Lipid panel 22 reviewed, wnl  - Educated patient on low cholesterol diet  - Continue Zetia 10 mg and Lipitor 80 mg daily     Pre-diabetes: improved  - A1C 22 6.2  - Continue Metformin 500 mg BID  - Educated patient on diabetic diet.    There are no diagnoses linked to this encounter.     Pt should return to clinic for f/u with me in 3 months    Brice Mckeon MD  2022    Pt was seen and plan of care discussed with Nick.

## 2022-09-30 DIAGNOSIS — Z79.899 ENCOUNTER FOR LONG-TERM (CURRENT) USE OF HIGH-RISK MEDICATION: ICD-10-CM

## 2022-09-30 DIAGNOSIS — M19.90 INFLAMMATORY ARTHRITIS: ICD-10-CM

## 2022-10-05 NOTE — TELEPHONE ENCOUNTER
methotrexate 2.5 MG tablet 36 tablet 0 6/30/2022         Last Written Prescription Date:  6-  Last Fill Quantity: 36,   # refills: 0  Last Office Visit: 7-  Future Office visit:  11-    CBC RESULTS: Recent Labs   Lab Test 07/01/22  1018   WBC 6.4   RBC 5.33   HGB 13.4   HCT 43.8   MCV 82   MCH 25.1*   MCHC 30.6*   RDW 13.3          Creatinine   Date Value Ref Range Status   07/01/2022 1.09 0.66 - 1.25 mg/dL Final   06/01/2021 1.24 0.66 - 1.25 mg/dL Final   ]    Liver Function Studies -   Recent Labs   Lab Test 03/09/22  1714 01/05/22  1352 10/28/21  1145   PROTTOTAL  --   --  8.2   ALBUMIN 3.6   < > 3.9   BILITOTAL  --   --  0.6   ALKPHOS  --   --  104   AST 13   < > 23   ALT 34   < > 32    < > = values in this interval not displayed.       Routing refill request to provider for review/approval because:  Not on protocol.  Who should be managing?  Rheum or PCP?  Last ordered by PCP?  LFTs OVERDUE  NO SCHEDULED RHEUM APPOINTMENT      Kathleen M Doege RN

## 2022-10-07 NOTE — TELEPHONE ENCOUNTER
This drug is out of my scope. I hope Rheum can fill if he is supposed to be taking it.  Thanks,  Ana Paula Thapa MD  Internal Medicine

## 2022-10-30 ENCOUNTER — HEALTH MAINTENANCE LETTER (OUTPATIENT)
Age: 62
End: 2022-10-30

## 2022-11-09 NOTE — PROGRESS NOTES
PRIMARY [facility]ARE [facility]       SUBJECTIVE:  Damian Tejada is a 62 year old male with a PMHx of  psoriasis, CAD (STEMI)  on 8/14/16 s/p NATY x2 to the proximal LAD, and HTN  who comes in for follow up visit.  Patient's blood pressure is well controlled on current medication, denies any side effects.  Recommended patient to start aspirin 81 mg once daily, patient agrees.  We also discussed improving diet and avoiding fast food as much as possible, increasing exercise regimen.  Patient agrees to receive COVID bivalent booster today, already received his flu shot at work a week ago. Otherwise, Patient denies any fever, chills, N/V, dizziness, chest pain, palpitations, shortness of breath, abdominal pain, diarrhea, urinary symptoms.       Medications and allergies reviewed by me today.     ROS:   Constitutional, neuro, ENT, endocrine, pulmonary, cardiac, gastrointestinal, genitourinary, musculoskeletal, integument and psychiatric systems are negative, except as otherwise noted.    OBJECTIVE:    /73 (BP Location: Right arm, Patient Position: Sitting, Cuff Size: Adult Regular)   Pulse 77   Wt 79.4 kg (175 lb)   SpO2 97%   BMI 27.41 kg/m     Wt Readings from Last 1 Encounters:   07/14/22 77.3 kg (170 lb 8 oz)        GENERAL APPEARANCE: healthy, alert and no distress     NECK: no adenopathy, no asymmetry, masses, or scars and thyroid normal to palpation     RESP: lungs clear to auscultation - no rales, rhonchi or wheezes     CV: regular rates and rhythm, normal S1 S2, no S3 or S4 and no murmur, click or rub     ABDOMEN:  soft, nontender, no HSM or masses and bowel sounds normal     MS: extremities normal- no gross deformities noted, no evidence of inflammation in joints, FROM in all extremities.     SKIN: no suspicious lesions or rashes     NEURO: Normal strength and tone, sensory exam grossly normal, mentation intact and speech normal     PSYCH: mentation appears normal. and affect normal/bright      ASSESSMENT/PLAN:    Essential HTN, well controlled  - continue Lisinopril 5 mg every day, Coreg 12.5 mg BID. Refilled at this visit.  - Continue Restricted salt diet, improve diet     CAD (STEMI)    - on 8/14/16 s/p NATY x2 to the proximal LAD  - Start Asp 81 mg daily  - Recommended patient to follow up with cardiologist, he refused     Hyperlipidemia, well controlled  - Lipid panel 7/1/22 reviewed, wnl  - Educated patient on low cholesterol diet  - Continue Zetia 10 mg and Lipitor 80 mg daily     Pre-diabetes: improved  - A1C 7/1/22 6.2, will repeat today; goal <6.5  - Continue Metformin 500 mg BID     Preventive: COVID bivalent booster administered at this visit. Received Flu shot at work ~1 week ago. Consider Zoster vaccine at next visit.     Pt should return to clinic for f/u with me in 6 month     Brice Mckeon DO, PGY1  Internal Medicine  Nov 10, 2022    Pt was seen and plan of care discussed with Dr. Suarez.

## 2022-11-10 ENCOUNTER — LAB (OUTPATIENT)
Dept: LAB | Facility: CLINIC | Age: 62
End: 2022-11-10
Payer: COMMERCIAL

## 2022-11-10 ENCOUNTER — OFFICE VISIT (OUTPATIENT)
Dept: INTERNAL MEDICINE | Facility: CLINIC | Age: 62
End: 2022-11-10
Payer: COMMERCIAL

## 2022-11-10 VITALS
HEART RATE: 77 BPM | WEIGHT: 175 LBS | BODY MASS INDEX: 27.41 KG/M2 | SYSTOLIC BLOOD PRESSURE: 123 MMHG | OXYGEN SATURATION: 97 % | DIASTOLIC BLOOD PRESSURE: 73 MMHG

## 2022-11-10 DIAGNOSIS — I10 ESSENTIAL HYPERTENSION: ICD-10-CM

## 2022-11-10 DIAGNOSIS — R73.03 PRE-DIABETES: ICD-10-CM

## 2022-11-10 DIAGNOSIS — I25.10 CORONARY ARTERY DISEASE INVOLVING NATIVE CORONARY ARTERY OF NATIVE HEART WITHOUT ANGINA PECTORIS: ICD-10-CM

## 2022-11-10 DIAGNOSIS — E78.5 HYPERLIPIDEMIA LDL GOAL <70: ICD-10-CM

## 2022-11-10 LAB — HBA1C MFR BLD: 6.1 %

## 2022-11-10 PROCEDURE — 99000 SPECIMEN HANDLING OFFICE-LAB: CPT | Performed by: PATHOLOGY

## 2022-11-10 PROCEDURE — 91312 COVID-19,PF,PFIZER BOOSTER BIVALENT: CPT

## 2022-11-10 PROCEDURE — 83036 HEMOGLOBIN GLYCOSYLATED A1C: CPT | Performed by: PATHOLOGY

## 2022-11-10 PROCEDURE — 0124A COVID-19,PF,PFIZER BOOSTER BIVALENT: CPT

## 2022-11-10 PROCEDURE — 36415 COLL VENOUS BLD VENIPUNCTURE: CPT | Performed by: PATHOLOGY

## 2022-11-10 PROCEDURE — 99214 OFFICE O/P EST MOD 30 MIN: CPT | Mod: 25

## 2022-11-10 RX ORDER — ATORVASTATIN CALCIUM 80 MG/1
80 TABLET, FILM COATED ORAL DAILY
Qty: 90 TABLET | Refills: 3 | Status: SHIPPED | OUTPATIENT
Start: 2022-11-10 | End: 2023-08-31

## 2022-11-10 RX ORDER — LISINOPRIL 5 MG/1
5 TABLET ORAL DAILY
Qty: 30 TABLET | Refills: 4 | Status: SHIPPED | OUTPATIENT
Start: 2022-11-10 | End: 2023-07-24

## 2022-11-10 RX ORDER — EZETIMIBE 10 MG/1
10 TABLET ORAL DAILY
Qty: 30 TABLET | Refills: 4 | Status: SHIPPED | OUTPATIENT
Start: 2022-11-10 | End: 2023-10-24

## 2022-11-10 RX ORDER — CARVEDILOL 12.5 MG/1
12.5 TABLET ORAL 2 TIMES DAILY WITH MEALS
Qty: 200 TABLET | Refills: 2 | Status: SHIPPED | OUTPATIENT
Start: 2022-11-10 | End: 2023-08-31

## 2022-11-10 NOTE — NURSING NOTE
Damian Tejada received the bivalent Covid booster (Pfizer) in clinic today at the request of Dr. Mckeon. The immunization site was cleaned with an alcohol prep wipe. The immunization was given without incident--see immunization list for administration details. No swelling or redness was observed at the site of injection after the immunization was given. Pt has no history of reaction to vaccines. Pt remained in [facility] for 15 minutes in case of an adverse reaction.     CHAR Luciano at 2:38 PM on 11/10/2022
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
## 2023-04-20 ENCOUNTER — TELEPHONE (OUTPATIENT)
Dept: INTERNAL MEDICINE | Facility: CLINIC | Age: 63
End: 2023-04-20
Payer: COMMERCIAL

## 2023-04-24 ENCOUNTER — TELEPHONE (OUTPATIENT)
Dept: INTERNAL MEDICINE | Facility: CLINIC | Age: 63
End: 2023-04-24
Payer: COMMERCIAL

## 2023-06-01 ENCOUNTER — HEALTH MAINTENANCE LETTER (OUTPATIENT)
Age: 63
End: 2023-06-01

## 2023-07-23 DIAGNOSIS — I10 ESSENTIAL HYPERTENSION: ICD-10-CM

## 2023-07-24 NOTE — TELEPHONE ENCOUNTER
lisinopril (ZESTRIL) 5 MG tablet      Last Written Prescription Date:  11/10/22  Last Fill Quantity: 30,   # refills: 4  Last Office Visit : 11/10/22  Future Office visit:  none    Routing refill request to provider for review/approval because:  Overdue for labs: Cr, K+  Gap in refills?

## 2023-07-26 ENCOUNTER — TELEPHONE (OUTPATIENT)
Dept: INTERNAL MEDICINE | Facility: CLINIC | Age: 63
End: 2023-07-26
Payer: COMMERCIAL

## 2023-07-26 RX ORDER — LISINOPRIL 5 MG/1
5 TABLET ORAL DAILY
Qty: 90 TABLET | Refills: 0 | Status: SHIPPED | OUTPATIENT
Start: 2023-07-26 | End: 2023-08-31

## 2023-08-31 ENCOUNTER — LAB (OUTPATIENT)
Dept: LAB | Facility: CLINIC | Age: 63
End: 2023-08-31
Payer: COMMERCIAL

## 2023-08-31 ENCOUNTER — OFFICE VISIT (OUTPATIENT)
Dept: INTERNAL MEDICINE | Facility: CLINIC | Age: 63
End: 2023-08-31
Payer: COMMERCIAL

## 2023-08-31 VITALS
BODY MASS INDEX: 25.77 KG/M2 | OXYGEN SATURATION: 97 % | HEIGHT: 67 IN | WEIGHT: 164.2 LBS | SYSTOLIC BLOOD PRESSURE: 126 MMHG | DIASTOLIC BLOOD PRESSURE: 81 MMHG | HEART RATE: 52 BPM

## 2023-08-31 DIAGNOSIS — I25.10 CORONARY ARTERY DISEASE INVOLVING NATIVE CORONARY ARTERY OF NATIVE HEART WITHOUT ANGINA PECTORIS: ICD-10-CM

## 2023-08-31 DIAGNOSIS — R73.03 PRE-DIABETES: ICD-10-CM

## 2023-08-31 DIAGNOSIS — I10 ESSENTIAL HYPERTENSION: ICD-10-CM

## 2023-08-31 DIAGNOSIS — E78.5 HYPERLIPIDEMIA LDL GOAL <70: ICD-10-CM

## 2023-08-31 DIAGNOSIS — R73.03 PRE-DIABETES: Primary | ICD-10-CM

## 2023-08-31 LAB
ANION GAP SERPL CALCULATED.3IONS-SCNC: 6 MMOL/L (ref 7–15)
BUN SERPL-MCNC: 16.1 MG/DL (ref 8–23)
CALCIUM SERPL-MCNC: 9.4 MG/DL (ref 8.8–10.2)
CHLORIDE SERPL-SCNC: 104 MMOL/L (ref 98–107)
CHOLEST SERPL-MCNC: 132 MG/DL
CREAT SERPL-MCNC: 1.27 MG/DL (ref 0.67–1.17)
DEPRECATED HCO3 PLAS-SCNC: 28 MMOL/L (ref 22–29)
GFR SERPL CREATININE-BSD FRML MDRD: 64 ML/MIN/1.73M2
GLUCOSE SERPL-MCNC: 128 MG/DL (ref 70–99)
HBA1C MFR BLD: 7.2 %
HDLC SERPL-MCNC: 57 MG/DL
LDLC SERPL CALC-MCNC: 64 MG/DL
NONHDLC SERPL-MCNC: 75 MG/DL
POTASSIUM SERPL-SCNC: 4.2 MMOL/L (ref 3.4–5.3)
SODIUM SERPL-SCNC: 138 MMOL/L (ref 136–145)
TRIGL SERPL-MCNC: 54 MG/DL

## 2023-08-31 PROCEDURE — 99000 SPECIMEN HANDLING OFFICE-LAB: CPT | Performed by: PATHOLOGY

## 2023-08-31 PROCEDURE — 36415 COLL VENOUS BLD VENIPUNCTURE: CPT | Performed by: PATHOLOGY

## 2023-08-31 PROCEDURE — 99214 OFFICE O/P EST MOD 30 MIN: CPT | Mod: GC

## 2023-08-31 PROCEDURE — 80061 LIPID PANEL: CPT | Performed by: PATHOLOGY

## 2023-08-31 PROCEDURE — 83036 HEMOGLOBIN GLYCOSYLATED A1C: CPT

## 2023-08-31 PROCEDURE — 80048 BASIC METABOLIC PNL TOTAL CA: CPT | Performed by: PATHOLOGY

## 2023-08-31 RX ORDER — ATORVASTATIN CALCIUM 80 MG/1
80 TABLET, FILM COATED ORAL DAILY
Qty: 90 TABLET | Refills: 4 | Status: SHIPPED | OUTPATIENT
Start: 2023-08-31 | End: 2024-05-02

## 2023-08-31 RX ORDER — LISINOPRIL 5 MG/1
5 TABLET ORAL DAILY
Qty: 90 TABLET | Refills: 4 | Status: SHIPPED | OUTPATIENT
Start: 2023-08-31 | End: 2024-05-02

## 2023-08-31 RX ORDER — CARVEDILOL 12.5 MG/1
12.5 TABLET ORAL 2 TIMES DAILY WITH MEALS
Qty: 200 TABLET | Refills: 4 | Status: SHIPPED | OUTPATIENT
Start: 2023-08-31 | End: 2024-01-11

## 2023-08-31 NOTE — NURSING NOTE
Damian Tejada is a 63 year old male that presents in clinic today for the following:     Chief Complaint   Patient presents with    Follow Up     Pt here for their 6 month follow up       The patient's allergies and medications were reviewed. The patient's vitals were obtained without incident. The patient does not have any other questions for the provider.     Susan Thacker, EMT at 8:31 AM on 8/31/2023.  Primary Care Clinic: [phone]

## 2023-08-31 NOTE — PROGRESS NOTES
"                     PRIMARY CARE CENTER       SUBJECTIVE:  Damian Tejada is a 62 year old male with a PMHx of  psoriasis, CAD (STEMI)  on 8/14/16 s/p NATY x2 to the proximal LAD, and HTN  who comes in for follow up visit. Pt has not been taking his Metformin and Aspirin, but he is taking his antihypertensives and lipitor regularly.  We had a long discussion regarding aspirin, I strongly encourage patient to continue aspirin given history of STEMI stent placement 2016.  Patient refuses to take medication at this time, but agrees to taking all other medications.  He is very much in the mindset to only take minimal amount of medications.  Of note patient was on Plavix for 3 years after stent placement in 2016.  Patient has not seen cardiology since 2016, cardiology referral placed, patient willing to schedule appointment.    Pt lives with his girlfriend, does not have any children. Tries to avoid fast food as much as possible, but is hard since \"all my life I have been eating fast food\". 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       Medications and allergies reviewed by me today.     ROS:   Constitutional, neuro, ENT, endocrine, pulmonary, cardiac, gastrointestinal, genitourinary, musculoskeletal, integument and psychiatric systems are negative, except as otherwise noted.    OBJECTIVE:    /81 (BP Location: Right arm, Patient Position: Sitting, Cuff Size: Adult Regular)   Pulse 52   Ht 1.702 m (5' 7\")   Wt 74.5 kg (164 lb 3.2 oz)   SpO2 97%   BMI 25.72 kg/m     Wt Readings from Last 1 Encounters:   08/31/23 74.5 kg (164 lb 3.2 oz)     Gen: NAD, alert, cooperative, non-toxic  HEENT: EOMI, no conjunctival icterus, tracking appropriately  Resp: CTAB, no crackles or wheezes, no increased WOB  Cardiac: RRR, no S3/S4, prominent S2 noted  GI: soft, non-tender, non-distended  Ext: WWP, no edema, no erythema  Neuro: AOx3, sensation intact b/l  Psych: appropriate mood & affect       ASSESSMENT/PLAN:  Damian Tejada is a 62 " year old male with a PMHx of  psoriasis, CAD (STEMI)  on 8/14/16 s/p NATY x2 to the proximal LAD, and HTN  who comes in for follow up visit.    Essential HTN, well controlled  - continue Lisinopril 5 mg every day, Coreg 12.5 mg BID. Refilled at this visit.  - Continue Restricted salt diet, improve diet     CAD (STEMI)    - on 8/14/16 s/p NATY x2 to the proximal LAD  - Pt refuses to take aspirin  - Recommended patient to follow up with cardiologist, referral placed at this visit.      Hyperlipidemia, well controlled  - Lipid panel 7/1/22 reviewed, wnl. Repeat Lipid panel today  - Educated patient on low cholesterol diet  - Continue Lipitor 80 mg daily. Previously on Zetia, not taking currently     Pre-diabetes  - A1C 11/10/2022 6.1. Repeat A1C today  - Continue Metformin 500 mg BID, pt has not been taking it prior to this visit.        Pt should return to clinic for f/u with me in 6 month, plan to repeat A1C prior to visit    Brice Mckeon DO, PGY2  Internal Medicine  [facility], [facility]   Clinic phone [phone]  Aug 31, 2023    Pt was seen and plan of care discussed with RAKESH Morocho

## 2023-08-31 NOTE — Clinical Note
Jordy Narvaez, Just a reminder to: -- Code level 4 LOS for patients with either Rx meds or >2 test orders PLUS 2 chronic medical problems or 1 worsening chronic medical problem. If you're not sure about coding, use the Epic wizard icon, or ask your preceptor.  Otherwise, keep up the good work! Fermín

## 2023-08-31 NOTE — PROGRESS NOTES
"I, Doyle Duque MD saw the patient with the resident, and agree with the resident's findings and plan of care as documented in the resident's note.  /81 (BP Location: Right arm, Patient Position: Sitting, Cuff Size: Adult Regular)   Pulse 52   Ht 1.702 m (5' 7\")   Wt 74.5 kg (164 lb 3.2 oz)   SpO2 97%   BMI 25.72 kg/m    I personally reviewed vital signs and past record.  Key findings: multiple chronic medical problems, here for regular follow-up. Encouraged metformin restart and to follow-up with cardiologist. Declined several medicines.     "

## 2023-09-07 NOTE — TELEPHONE ENCOUNTER
RECORDS RECEIVED FROM:    DATE RECEIVED:    NOTES STATUS DETAILS   OFFICE NOTE from referring provider  Internal Dr. Mckeon 8-31-23   OFFICE NOTE from other cardiologists  N/A    RECORDS from hospital/ED N/A    MEDICATION LIST Internal    GENERAL CARDIO RECORDS   (ALL APPOINTMENT TYPES)     LABS (CBC,BMP,CMP, TSH) Internal    EKG (STRIPS & REPORTS) N/A    MONITORS (STRIPS & REPORTS) N/A    ECHOS (IMAGES AND REPORTS) Internal 10-29-21   STRESS TESTS (IMAGES AND REPORTS) N/A    cMRI (IMAGES AND REPORTS) N/A    CT/CTA (IMAGES AND REPORTS) N/A       98

## 2023-09-08 ENCOUNTER — TELEPHONE (OUTPATIENT)
Dept: INTERNAL MEDICINE | Facility: CLINIC | Age: 63
End: 2023-09-08

## 2023-09-08 NOTE — TELEPHONE ENCOUNTER
"I called Damian with the assistance of an  to follow-up on lab results/recommendations from his labs on 8/31. Per Dr. Mckeon:     \"I have been trying to get a hold of Mr Damian Tejada since last week's visit, but have not been successful. Could you please try and call him as well whenever you have some time this week/next week?     His A1C is elevated from prior. Currently he is on Metformin 500 mg BID, he should increase morning dose to 1000 mg after 7 days, and further increase his evening dose to 1000 mg 7 days later. If he is experiencing any n/v/diarrhea, he should decrease dose back to prior dose and contact us. I plan to repeat A1c in 3 months during our clinic visit (labs already ordered). I left him a voicemail as well. \"    I reached voicemail. Message left with assistance of  requesting Damian contact the clinic to discuss recent results and recommendations.    Mishel Katz RN ([facility])    "

## 2023-10-24 ENCOUNTER — OFFICE VISIT (OUTPATIENT)
Dept: CARDIOLOGY | Facility: CLINIC | Age: 63
End: 2023-10-24
Attending: INTERNAL MEDICINE
Payer: COMMERCIAL

## 2023-10-24 ENCOUNTER — PRE VISIT (OUTPATIENT)
Dept: CARDIOLOGY | Facility: CLINIC | Age: 63
End: 2023-10-24
Payer: COMMERCIAL

## 2023-10-24 VITALS
HEART RATE: 51 BPM | DIASTOLIC BLOOD PRESSURE: 82 MMHG | OXYGEN SATURATION: 97 % | BODY MASS INDEX: 26.36 KG/M2 | WEIGHT: 168.3 LBS | SYSTOLIC BLOOD PRESSURE: 145 MMHG

## 2023-10-24 DIAGNOSIS — I25.2 OLD MYOCARDIAL INFARCTION: Primary | ICD-10-CM

## 2023-10-24 DIAGNOSIS — N18.2 CKD (CHRONIC KIDNEY DISEASE) STAGE 2, GFR 60-89 ML/MIN: ICD-10-CM

## 2023-10-24 DIAGNOSIS — I25.10 CORONARY ARTERY DISEASE INVOLVING NATIVE CORONARY ARTERY OF NATIVE HEART WITHOUT ANGINA PECTORIS: ICD-10-CM

## 2023-10-24 DIAGNOSIS — E11.59 TYPE 2 DIABETES MELLITUS WITH OTHER CIRCULATORY COMPLICATION, WITHOUT LONG-TERM CURRENT USE OF INSULIN (H): ICD-10-CM

## 2023-10-24 PROBLEM — Z79.4 TYPE 2 DIABETES MELLITUS WITH CIRCULATORY DISORDER, WITH LONG-TERM CURRENT USE OF INSULIN (H): Status: ACTIVE | Noted: 2023-10-24

## 2023-10-24 PROBLEM — E11.9 DIABETES MELLITUS, TYPE 2 (H): Status: ACTIVE | Noted: 2023-10-24

## 2023-10-24 PROCEDURE — 99213 OFFICE O/P EST LOW 20 MIN: CPT | Performed by: INTERNAL MEDICINE

## 2023-10-24 PROCEDURE — 99203 OFFICE O/P NEW LOW 30 MIN: CPT | Performed by: INTERNAL MEDICINE

## 2023-10-24 ASSESSMENT — PAIN SCALES - GENERAL: PAINLEVEL: NO PAIN (0)

## 2023-10-24 NOTE — NURSING NOTE
Chief Complaint   Patient presents with    New Patient     new - referral from PCP       Vitals were taken, medications reviewed.    Colleen Navas, EMT   10:10 AM

## 2023-10-24 NOTE — LETTER
10/24/2023      RE: Damian Tejada  [address]  [facility] [address]       Dear Colleague,    Thank you for the opportunity to participate in the care of your patient, Damian Tejada, at the [facility] [place] [facility]. Please see a copy of my visit note below.    I am delighted to see Damian Tejaad in consultation.The primary encounter diagnosis was Old myocardial infarction. Diagnoses of Coronary artery disease involving native coronary artery of native heart without angina pectoris, Type 2 diabetes mellitus with other circulatory complication, without long-term current use of insulin (H), and CKD (chronic kidney disease) stage 2, GFR 60-89 ml/min were also pertinent to this visit.   As you know, the patient is a 63 year old  male. He   has a past medical history of BPH (benign prostatic hyperplasia), Chronic kidney disease, Coronary artery disease (08/14/2016), Diabetes (H), Hyperlipidemia, Hyperlipidemia LDL goal <70, and Hypertension..    On this visit, the patient states that he has good exercise tolerance.  The patient denies chest pressure/discomfort, dyspnea, palpitations, near-syncope, syncope, orthopnea, paroxysmal nocturnal dyspnea, and lower extermity edema.    The patient's cardiovascular risk factors include known cardiac disease, hypertension, high cholesterol, and diabetes mellitus.    The following portions of the patient's history were reviewed and updated as appropriate: allergies, current medications, past family history, past medical history, past social history, past surgical history, and the problem list.    PMH: The patient's past medical history includes:    Past Medical History:   Diagnosis Date    BPH (benign prostatic hyperplasia)     Chronic kidney disease     Coronary artery disease 08/14/2016    anterior STEMI s/p NATY X2    Diabetes (H)     Hyperlipidemia     Hyperlipidemia LDL goal <70      Hypertension       Past Surgical History:   Procedure Laterality Date    HEMORRHOIDECTOMY  Summer 2008    PROCTOPLASTY  Summer 2008    RECTAL SURGERY         The patient's medications as of the current encounter are:     Current Outpatient Medications   Medication Sig Dispense Refill    atorvastatin (LIPITOR) 80 MG tablet Take 1 tablet (80 mg) by mouth daily For additional refills, please schedule a follow-up appointment at [phone], overdue for lab 90 tablet 4    carvedilol (COREG) 12.5 MG tablet Take 1 tablet (12.5 mg) by mouth 2 times daily (with meals) 200 tablet 4    lisinopril (ZESTRIL) 5 MG tablet Take 1 tablet (5 mg) by mouth daily 90 tablet 4    metFORMIN (GLUCOPHAGE) 500 MG tablet Take 1 tablet (500 mg) by mouth 2 times daily (with meals) 60 tablet 4       Labs:     Lab on 08/31/2023   Component Date Value Ref Range Status    Hemoglobin A1C 08/31/2023 7.2 (H)  <5.7 % Final    Normal <5.7%   Prediabetes 5.7-6.4%    Diabetes 6.5% or higher     Note: Adopted from ADA consensus guidelines.    Cholesterol 08/31/2023 132  <200 mg/dL Final    Triglycerides 08/31/2023 54  <150 mg/dL Final    Direct Measure HDL 08/31/2023 57  >=40 mg/dL Final    LDL Cholesterol Calculated 08/31/2023 64  <=100 mg/dL Final    Non HDL Cholesterol 08/31/2023 75  <130 mg/dL Final    Sodium 08/31/2023 138  136 - 145 mmol/L Final    Potassium 08/31/2023 4.2  3.4 - 5.3 mmol/L Final    Chloride 08/31/2023 104  98 - 107 mmol/L Final    Carbon Dioxide (CO2) 08/31/2023 28  22 - 29 mmol/L Final    Anion Gap 08/31/2023 6 (L)  7 - 15 mmol/L Final    Urea Nitrogen 08/31/2023 16.1  8.0 - 23.0 mg/dL Final    Creatinine 08/31/2023 1.27 (H)  0.67 - 1.17 mg/dL Final    Calcium 08/31/2023 9.4  8.8 - 10.2 mg/dL Final    Glucose 08/31/2023 128 (H)  70 - 99 mg/dL Final    GFR Estimate 08/31/2023 64  >60 mL/min/1.73m2 Final       Allergies:  No Known Allergies    Family History:   Family History   Problem Relation Age of Onset    No Known Problems  Brother     No Known Problems Brother     No Known Problems Sister     No Known Problems Sister     Skin Cancer No family hx of     Melanoma No family hx of        Psychosocial history:  reports that he has never smoked. He has never used smokeless tobacco. He reports that he does not drink alcohol and does not use drugs.    Review of systems: negative for, palpitations, exertional chest pain or pressure, paroxysmal nocturnal dyspnea, dyspnea on exertion, orthopnea, lower extremity edema, syncope or near-syncope, claudication, and exercise intolerance    In addition,   General: No change in weight, sleep or appetite.  Normal energy.  No fever or chills  Eyes: Negative for vision changes or eye problems  ENT: No problems with ears, nose or throat.  No difficulty swallowing.  Resp: No coughing, wheezing or shortness of breath  GI: No nausea, vomiting,  heartburn, abdominal pain, diarrhea, constipation or change in bowel habits  : No urinary frequency or dysuria, bladder or kidney problems  Musculoskeletal: No significant muscle or joint pains  Neurologic: No headaches, numbness, tingling, weakness, problems with balance or coordination  Psychiatric: No problems with anxiety, depression or mental health  Heme/immune/allergy: No history of bleeding or clotting problems or anemia.  No allergies or immune system problems  Endocrine: No history of thyroid disease, diabetes or other endocrine disorders  Skin: Psoriasis  Vascular:  No claudication, lifestyle limiting or otherwise; no ischemic rest pain; no non-healing ulcers. No weakness, No loss of sensation        Physical examination  Vitals: BP (!) 145/82 (BP Location: Right arm, Patient Position: Sitting, Cuff Size: Adult Regular)   Pulse 51   Wt 76.3 kg (168 lb 4.8 oz)   SpO2 97%   BMI 26.36 kg/m    BMI= Body mass index is 26.36 kg/m .    In general, the patient is a pleasant male in no apparent distress.    HEENT: Normiocephalic and atraumatic.  EVELYN.  ENRIQUE.   Sclerae white, not injected.  Nares clear.  Pharynx without erythema or exudate.  Dentition intact.    Neck: No adenopathy.  No thyromegaly. Carotids +2/2 bilaterally without bruits.  No jugular venous distension.   Heart:  The PMI is in the 5th ICS in the midclavicular line. There is no heave. Regular rate and rhythm. Normal S1, S2 splits physiologically. No murmur, rub, click, or gallop.    Lungs: Clear to asculation.  No ronchi, wheezes, rales.  No dullness to percussion.   Abdomen: Soft, nontender, nondistended. No organomegaly. No AAA.  No bruits.   Extremities: No clubbing, cyanosis, or edema. The pulses were intact bilaterally.   Neurological: The neurological examination reveal a patient who was oriented to person, place, and time.  The remainder of the examination was nonfocal.    Cardiac tests include:    10/29/21 echo - normal EF, septal WMA    Assessment and Plan    CAD - recommend ASA 81 mg a day  - on metoprolol  HL - on statin  HTN - on lisinopril  DM - on metformin    The patient is to return  prn. The patient understood the treatment plan as outlined above.  There were no barriers to learning.      Lane Gill MD

## 2024-01-09 NOTE — PROGRESS NOTES
"                     PRIMARY CARE CENTER       SUBJECTIVE:  Damian Tejada is a 63 year old male with a PMHx of  psoriasis, CAD (STEMI)  on 8/14/16 s/p NATY x2 to the proximal LAD, T2DM, and HTN  who comes in for follow up visit.     Patient was seen by cardiology on 10/20/2023 for his known history of CAD status post stents.  He was advised to resume taking aspirin 81 mg daily, however patient has not started taking medication yet, he states \" I prefer to take the least amount of medications possible\".     We had a long conversation regarding his increase in A1c which indicates diabetes.  Patient known in the past intermittently stopped taking his metformin, however he states he has been taking  Metformin 500 mg BID consistently for the past couple months.     Blood pressure also noted to be in the low 90/61 at today's visit.  Patient states he is currently asymptomatic, however he reports episodes of orthostatic hypotension in the past 2 to 3 months while at home.  He is usually experiencing these events after laying down for prolonged time, feeling lightheaded approximately 15 seconds at a time when standing up from a sitting position.  He denies any syncopal events and/or falls.  He does not have a blood pressure cuff at home and does not check his blood pressure regularly.  He continues to be compliant with his PTA lisinopril and Coreg.    Otherwise he denies any chest pain, fever or chills, shortness of breath, palpitations, abdominal pain, diarrhea, urinary symptoms.    Medications and allergies reviewed by me today.     ROS:   Constitutional, neuro, ENT, endocrine, pulmonary, cardiac, gastrointestinal, genitourinary, musculoskeletal, integument and psychiatric systems are negative, except as otherwise noted.    OBJECTIVE:    BP 98/61 (BP Location: Right arm, Patient Position: Sitting, Cuff Size: Adult Regular)   Pulse 56   Ht 1.702 m (5' 7\")   Wt 71.4 kg (157 lb 4.8 oz)   SpO2 98%   BMI 24.64 kg/m     Wt " Readings from Last 1 Encounters:   01/11/24 71.4 kg (157 lb 4.8 oz)     Gen: NAD, alert, cooperative, non-toxic  Resp: CTAB, no crackles or wheezes, no increased WOB  Cardiac: RRR, no S3/S4, no M/R/G appreciated  GI: soft, non-tender, non-distended  Ext: WWP, no edema, no erythema  Neuro: AOx3, sensation intact b/l  Psych: appropriate mood & affect       ASSESSMENT/PLAN:    Damian Tejada is a 62 year old male with a PMHx of  psoriasis, CAD (STEMI)  on 8/14/16 s/p NATY x2 to the proximal LAD, T2DM, and HTN  who comes in for follow up visit.    #T2DM  Pt has a hx of pre-diabetes, most recent A1C was 7.2 on 8/2023 (up from previous 6.1). Pt has been taking Metformin 500 mg BID.   - Increase Metformin to 1000 mg BID, continue to monitor for GI side effects.   - Diabetic nutritionist referral offered to patient, he refused  - Repeat A1C today and in 3 months at follow up visit  - Urine albumin to Cr ratio  - BMP  - Consider Ophthalmology referral at next visit    #Orthostatic hypotension  BP 90/61 at today's visit.  Patient states he is currently asymptomatic, however he reports episodes of orthostatic hypotension in the past 2 to 3 months, denies any syncopal events. Currently on Lisinopril 5 mg daily and Coreg 12.5 mg BID for hx of HTN/CAD.   - Decrease Coreg 12.5 mg BID to 6.125 BID   - Advised patient to buy blood pressure cuff at home and check BP daily. Call clinic if SBP<90, would consider further decreasing PTA Coreg.      #Essential HTN, well controlled  - continue Lisinopril 5 mg every day  - Decrease Coreg 12.5 mg BID to 6.125 BID given current orthostatic hypotension  - Continue Restricted salt diet, improve diet     #CAD (STEMI), s/p NATY x2 to proximal LAD 8/14/2016   Pt seen by Cardiology on 10/24/23, was recommended to start Asp 81 mg daily  - Start Asp 81 mg daily (prescribed today)    #Hx of colon polyps  Previous Colonoscopy 1/14/2019, one 3mm sessile polyp noted in the transverse colon. Recommended 5 year  follow up colonoscopy  - Pt states he received follow up call, planning on schedule repeat colonoscopy soon.      #Hyperlipidemia, well controlled  Recent Labs   Lab Test 08/31/23  0925 07/01/22  1018   CHOL 132 168   HDL 57 59   LDL 64 99   TRIG 54 50   - Educated patient on low cholesterol diet  - Continue Lipitor 80 mg daily. Previously on Zetia, not taking currently    Other orders  -     INFLUENZA VACCINE >6 MONTHS (AFLURIA/FLUZONE)  -     Recommend obtaining RSV vaccine at local pharmacy    Next visit: Check A1C, follow up on colonoscopy.      Pt should return to clinic for f/u with me in 3 month    Brice Mckeon DO, PGY2  Internal Medicine  [facility], [facility]   Clinic phone [phone]  Jan 11, 2024    Pt was seen and plan of care discussed with REX Jacobsen

## 2024-01-11 ENCOUNTER — LAB (OUTPATIENT)
Dept: LAB | Facility: CLINIC | Age: 64
End: 2024-01-11
Payer: COMMERCIAL

## 2024-01-11 ENCOUNTER — OFFICE VISIT (OUTPATIENT)
Dept: INTERNAL MEDICINE | Facility: CLINIC | Age: 64
End: 2024-01-11
Payer: COMMERCIAL

## 2024-01-11 VITALS
SYSTOLIC BLOOD PRESSURE: 98 MMHG | OXYGEN SATURATION: 98 % | BODY MASS INDEX: 24.69 KG/M2 | HEIGHT: 67 IN | WEIGHT: 157.3 LBS | HEART RATE: 56 BPM | DIASTOLIC BLOOD PRESSURE: 61 MMHG

## 2024-01-11 DIAGNOSIS — E11.59 TYPE 2 DIABETES MELLITUS WITH OTHER CIRCULATORY COMPLICATION, WITHOUT LONG-TERM CURRENT USE OF INSULIN (H): ICD-10-CM

## 2024-01-11 DIAGNOSIS — R73.03 PRE-DIABETES: ICD-10-CM

## 2024-01-11 DIAGNOSIS — E11.59 TYPE 2 DIABETES MELLITUS WITH OTHER CIRCULATORY COMPLICATION, WITHOUT LONG-TERM CURRENT USE OF INSULIN (H): Primary | ICD-10-CM

## 2024-01-11 DIAGNOSIS — I10 ESSENTIAL HYPERTENSION: ICD-10-CM

## 2024-01-11 DIAGNOSIS — I25.10 CORONARY ARTERY DISEASE INVOLVING NATIVE CORONARY ARTERY OF NATIVE HEART WITHOUT ANGINA PECTORIS: ICD-10-CM

## 2024-01-11 DIAGNOSIS — I25.2 OLD MYOCARDIAL INFARCTION: ICD-10-CM

## 2024-01-11 LAB
ANION GAP SERPL CALCULATED.3IONS-SCNC: 8 MMOL/L (ref 7–15)
BUN SERPL-MCNC: 16.6 MG/DL (ref 8–23)
CALCIUM SERPL-MCNC: 9.4 MG/DL (ref 8.8–10.2)
CHLORIDE SERPL-SCNC: 105 MMOL/L (ref 98–107)
CREAT SERPL-MCNC: 1.09 MG/DL (ref 0.67–1.17)
CREAT UR-MCNC: 233 MG/DL
DEPRECATED HCO3 PLAS-SCNC: 25 MMOL/L (ref 22–29)
EGFRCR SERPLBLD CKD-EPI 2021: 77 ML/MIN/1.73M2
GLUCOSE SERPL-MCNC: 114 MG/DL (ref 70–99)
HBA1C MFR BLD: 6.7 %
MICROALBUMIN UR-MCNC: <12 MG/L
MICROALBUMIN/CREAT UR: NORMAL MG/G{CREAT}
POTASSIUM SERPL-SCNC: 4.8 MMOL/L (ref 3.4–5.3)
SODIUM SERPL-SCNC: 138 MMOL/L (ref 135–145)

## 2024-01-11 PROCEDURE — 82043 UR ALBUMIN QUANTITATIVE: CPT

## 2024-01-11 PROCEDURE — 36415 COLL VENOUS BLD VENIPUNCTURE: CPT | Performed by: PATHOLOGY

## 2024-01-11 PROCEDURE — 99214 OFFICE O/P EST MOD 30 MIN: CPT | Mod: 25

## 2024-01-11 PROCEDURE — 80048 BASIC METABOLIC PNL TOTAL CA: CPT | Performed by: PATHOLOGY

## 2024-01-11 PROCEDURE — 90471 IMMUNIZATION ADMIN: CPT

## 2024-01-11 PROCEDURE — 90686 IIV4 VACC NO PRSV 0.5 ML IM: CPT

## 2024-01-11 PROCEDURE — 83036 HEMOGLOBIN GLYCOSYLATED A1C: CPT

## 2024-01-11 PROCEDURE — 99000 SPECIMEN HANDLING OFFICE-LAB: CPT | Performed by: PATHOLOGY

## 2024-01-11 RX ORDER — ASPIRIN 81 MG/1
81 TABLET ORAL DAILY
Qty: 60 TABLET | Refills: 3 | Status: SHIPPED | OUTPATIENT
Start: 2024-01-11

## 2024-01-11 RX ORDER — CARVEDILOL 12.5 MG/1
6.25 TABLET ORAL 2 TIMES DAILY WITH MEALS
Qty: 100 TABLET | Refills: 4 | Status: SHIPPED | OUTPATIENT
Start: 2024-01-11

## 2024-01-11 ASSESSMENT — PAIN SCALES - GENERAL: PAINLEVEL: NO PAIN (0)

## 2024-01-11 NOTE — PROGRESS NOTES
Damian is a 63 year old that presents in clinic today for the following:     Chief Complaint   Patient presents with    Follow Up     Pt here for three month follow up            1/11/2024     7:40 AM   Additional Questions   Roomed by O       Screenings as of today     PHQ-2 Total Score (Adult) - Positive if 3 or more points; Administer   PHQ-9 if positive 0        Vinh Thacker at 7:44 AM on 1/11/2024

## 2024-03-18 ENCOUNTER — LAB (OUTPATIENT)
Dept: LAB | Facility: CLINIC | Age: 64
End: 2024-03-18
Payer: COMMERCIAL

## 2024-03-18 DIAGNOSIS — R73.03 PRE-DIABETES: ICD-10-CM

## 2024-03-18 LAB — HBA1C MFR BLD: 6.3 %

## 2024-03-18 PROCEDURE — 99000 SPECIMEN HANDLING OFFICE-LAB: CPT | Performed by: PATHOLOGY

## 2024-03-18 PROCEDURE — 83036 HEMOGLOBIN GLYCOSYLATED A1C: CPT

## 2024-03-18 PROCEDURE — 36415 COLL VENOUS BLD VENIPUNCTURE: CPT | Performed by: PATHOLOGY

## 2024-05-02 ENCOUNTER — OFFICE VISIT (OUTPATIENT)
Dept: INTERNAL MEDICINE | Facility: CLINIC | Age: 64
End: 2024-05-02
Payer: COMMERCIAL

## 2024-05-02 VITALS
BODY MASS INDEX: 26.31 KG/M2 | DIASTOLIC BLOOD PRESSURE: 74 MMHG | WEIGHT: 168 LBS | HEART RATE: 63 BPM | SYSTOLIC BLOOD PRESSURE: 147 MMHG | OXYGEN SATURATION: 97 %

## 2024-05-02 DIAGNOSIS — I10 ESSENTIAL HYPERTENSION: ICD-10-CM

## 2024-05-02 DIAGNOSIS — E11.51 TYPE 2 DIABETES MELLITUS WITH DIABETIC PERIPHERAL ANGIOPATHY WITHOUT GANGRENE, WITHOUT LONG-TERM CURRENT USE OF INSULIN (H): Primary | ICD-10-CM

## 2024-05-02 DIAGNOSIS — I25.10 CORONARY ARTERY DISEASE INVOLVING NATIVE CORONARY ARTERY OF NATIVE HEART WITHOUT ANGINA PECTORIS: ICD-10-CM

## 2024-05-02 DIAGNOSIS — Z11.4 SCREENING FOR HIV (HUMAN IMMUNODEFICIENCY VIRUS): ICD-10-CM

## 2024-05-02 DIAGNOSIS — Z12.11 SCREEN FOR COLON CANCER: ICD-10-CM

## 2024-05-02 PROCEDURE — 99213 OFFICE O/P EST LOW 20 MIN: CPT | Mod: GE

## 2024-05-02 RX ORDER — ATORVASTATIN CALCIUM 80 MG/1
80 TABLET, FILM COATED ORAL DAILY
Qty: 90 TABLET | Refills: 11 | Status: SHIPPED | OUTPATIENT
Start: 2024-05-02

## 2024-05-02 RX ORDER — CARVEDILOL 12.5 MG/1
12.5 TABLET ORAL 2 TIMES DAILY WITH MEALS
Qty: 120 TABLET | Refills: 4 | Status: CANCELLED | OUTPATIENT
Start: 2024-05-02

## 2024-05-02 RX ORDER — LISINOPRIL 5 MG/1
10 TABLET ORAL DAILY
Qty: 90 TABLET | Refills: 4 | Status: SHIPPED | OUTPATIENT
Start: 2024-05-02

## 2024-05-02 NOTE — PROGRESS NOTES
Assessment & Plan   Damian Tejada is a 62 year old male with a PMHx of  psoriasis, CAD (STEMI)  on 8/14/16 s/p NATY x2 to the proximal LAD, T2DM, and HTN  who comes in for follow up visit.     #T2DM, A1C 6.3  A1C improved, UACR wnl  - Metformin 1000 mg BID, continue to monitor for GI side effects.   - Diabetic nutritionist referral offered to patient, he refused  - Repeat A1C in 6 months  - Ophthalmology referral placed for diabetes eye exam     #Essential HTN  Elevated at today's visit 156/80. I strongly encouraged pt to buy a blood pressure cuff at home, unfortunately he has not bought one yet.   - Increase Lisinopril to 10 mg daily. Follow up in 1 month with one of my colleagues for BP check and repeat BMP  - Coreg 6.125 BID  - Continue Restricted salt diet, improve diet     #CAD (STEMI), s/p NATY x2 to proximal LAD 8/14/2016   Pt seen by Cardiology on 10/24/23, was recommended to start Asp 81 mg daily  - Continue Asp 81 mg daily. Pt not taking it currently, he has been taking it intermittently. I strongly advised pt to continue daily Aspirin.      #Hx of colon polyps  Previous Colonoscopy 1/14/2019, one 3mm sessile polyp noted in the transverse colon. Recommended 5 year follow up colonoscopy  - Colonoscopy scheduled for August 2024.      #Hyperlipidemia, well controlled  Recent Labs   Lab Test 08/31/23  0925 07/01/22  1018   CHOL 132 168   HDL 57 59   LDL 64 99   TRIG 54 50   - Continue PTA Atorvastatin    #Lower extremities edema  +1 pitting edema in BLE. Pt denies any heart failure symptoms such as orthopnea, PND, etc. Recent TTE from 10/2021 showed LVEF 55-60% with distal septal akinesis. No significant valvular abnormalities.   - CTM, recommend exercise and low sodium diet.     #Orthostatic hypotension, resolved  BP 90/61 at prior visit. Coreg decreased to 6.125 mg BID, resolved.     Subjective   Damian is a 64 year old, presenting for the following health issues:  Follow Up (Hypertension)      5/2/2024     7:29  AM   Additional Questions   Roomed by JOANN, EMT     History of Present Illness       Reason for visit:  HypertensionHe consumes 0 sweetened beverage(s) daily. He exercises with enough effort to increase his heart rate 3 or less days per week. He is missing 1 dose(s) of medications per week.     Pt checks his blood pressure at work once in a while, usually is in the 120/70s.  I advise patient to but a home blood pressure cuff at each visit, unfortunately patient has not bought one yet.  Patient has cut down on soda and fast food.    She has not been taking Aspirin lately, forgot to refill his prescription.  Otherwise he is doing well, tolerating metformin without any GI side effects.  He denies any fever, chills, chest pain, shortness of breath, palpitations, abdominal pain, lightheadedness, urinary symptoms.        Objective    There were no vitals taken for this visit.  There is no height or weight on file to calculate BMI.  Physical Exam   Gen: NAD, alert, cooperative, non-toxic  Resp: CTAB, no crackles or wheezes, no increased WOB  Cardiac: RRR, no S3/S4, no M/R/G appreciated  GI: soft, non-tender, non-distended  Ext: WWP,  no erythema, +1 pitting edema BLE  Neuro: AOx3, sensation intact b/l, no focal deficits  Psych: appropriate mood & affect      No results found for any visits on 05/02/24.        Patient's treatment plan was discussed with Dr. Jona Suarez    Signed Electronically by:   Brice Mckeon DO, PGY2  Internal Medicine

## 2024-05-02 NOTE — PROGRESS NOTES
Damian Tejada is a 62 year old male with a PMHx of  psoriasis, CAD (STEMI)  on 8/14/16 s/p NATY x2 to the proximal LAD, T2DM, and HTN  who comes in for follow up visit.     #T2DM, A1C 6.3  A1C improved, UACR wnl  - Metformin 1000 mg BID, continue to monitor for GI side effects.   - Diabetic nutritionist referral offered to patient, he refused  - Repeat A1C in 6 months  - Ophthalmology referral***     #Orthostatic hypotension  BP 90/61 at today's visit.  Patient states he is currently asymptomatic, however he reports episodes of orthostatic hypotension in the past 2 to 3 months, denies any syncopal events. Currently on Lisinopril 5 mg daily and Coreg 12.5 mg BID for hx of HTN/CAD.   - Decrease Coreg 12.5 mg BID to 6.125 BID   - Advised patient to buy blood pressure cuff at home and check BP daily. Call clinic if SBP<90, would consider further decreasing PTA Coreg.      #Essential HTN, well controlled  - continue Lisinopril 5 mg every day  - Decrease Coreg 12.5 mg BID to 6.125 BID given current orthostatic hypotension  - Continue Restricted salt diet, improve diet     #CAD (STEMI), s/p NATY x2 to proximal LAD 8/14/2016   Pt seen by Cardiology on 10/24/23, was recommended to start Asp 81 mg daily  - Continue Asp 81 mg daily     #Hx of colon polyps  Previous Colonoscopy 1/14/2019, one 3mm sessile polyp noted in the transverse colon. Recommended 5 year follow up colonoscopy  - Pt states he received follow up call, planning on schedule repeat colonoscopy soon.      #Hyperlipidemia, well controlled  Recent Labs   Lab Test 08/31/23  0925 07/01/22  1018   CHOL 132 168   HDL 57 59   LDL 64 99   TRIG 54 50

## 2024-05-02 NOTE — PATIENT INSTRUCTIONS
Buy blood pressure cuff and check your blood pressure daily. If systolic blood pressure <120, go back to Lisinopril 5 mg.     Increase Lisinopril dose to 10mg daily. Continue Carvedilol 6.125 twice daily.

## 2024-05-03 NOTE — TELEPHONE ENCOUNTER
FUTURE VISIT INFORMATION      FUTURE VISIT INFORMATION:  Date: 7/9/24  Time: 12:00pm  Location: [facility] [place]  REFERRAL INFORMATION:  Referring provider:  Dr. Mckeon   Referring providers clinic:  [facility]  Reason for visit/diagnosis  diabetic eye exam, over 10 years since previous eye exam     RECORDS REQUESTED FROM:       Clinic name Comments Records Status Imaging Status   [facility] Ov/referral 5/2/24 epic

## 2024-06-09 ENCOUNTER — HEALTH MAINTENANCE LETTER (OUTPATIENT)
Age: 64
End: 2024-06-09

## 2024-07-02 ENCOUNTER — TELEPHONE (OUTPATIENT)
Dept: OPHTHALMOLOGY | Facility: CLINIC | Age: 64
End: 2024-07-02
Payer: COMMERCIAL

## 2024-07-09 ENCOUNTER — PRE VISIT (OUTPATIENT)
Dept: OPHTHALMOLOGY | Facility: CLINIC | Age: 64
End: 2024-07-09

## 2024-08-18 ENCOUNTER — HEALTH MAINTENANCE LETTER (OUTPATIENT)
Age: 64
End: 2024-08-18

## 2024-08-27 ENCOUNTER — LAB (OUTPATIENT)
Dept: LAB | Facility: CLINIC | Age: 64
End: 2024-08-27
Payer: COMMERCIAL

## 2024-08-27 DIAGNOSIS — E11.59 TYPE 2 DIABETES MELLITUS WITH CIRCULATORY DISORDER, WITHOUT LONG-TERM CURRENT USE OF INSULIN (H): ICD-10-CM

## 2024-08-27 DIAGNOSIS — E11.59 TYPE 2 DIABETES MELLITUS WITH OTHER CIRCULATORY COMPLICATION, WITHOUT LONG-TERM CURRENT USE OF INSULIN (H): ICD-10-CM

## 2024-08-27 DIAGNOSIS — E11.51 TYPE 2 DIABETES MELLITUS WITH DIABETIC PERIPHERAL ANGIOPATHY WITHOUT GANGRENE, WITHOUT LONG-TERM CURRENT USE OF INSULIN (H): ICD-10-CM

## 2024-08-27 DIAGNOSIS — Z13.220 SCREENING FOR HYPERLIPIDEMIA: ICD-10-CM

## 2024-08-27 DIAGNOSIS — Z11.4 SCREENING FOR HIV (HUMAN IMMUNODEFICIENCY VIRUS): Primary | ICD-10-CM

## 2024-08-27 LAB
ANION GAP SERPL CALCULATED.3IONS-SCNC: 10 MMOL/L (ref 7–15)
BUN SERPL-MCNC: 15.3 MG/DL (ref 8–23)
CALCIUM SERPL-MCNC: 9 MG/DL (ref 8.8–10.4)
CHLORIDE SERPL-SCNC: 103 MMOL/L (ref 98–107)
CHOLEST SERPL-MCNC: 160 MG/DL
CREAT SERPL-MCNC: 1.21 MG/DL (ref 0.67–1.17)
EGFRCR SERPLBLD CKD-EPI 2021: 68 ML/MIN/1.73M2
FASTING STATUS PATIENT QL REPORTED: ABNORMAL
FASTING STATUS PATIENT QL REPORTED: ABNORMAL
GLUCOSE SERPL-MCNC: 109 MG/DL (ref 70–99)
HBA1C MFR BLD: 6.2 %
HCO3 SERPL-SCNC: 25 MMOL/L (ref 22–29)
HDLC SERPL-MCNC: 45 MG/DL
HIV 1+2 AB+HIV1 P24 AG SERPL QL IA: NONREACTIVE
LDLC SERPL CALC-MCNC: 72 MG/DL
NONHDLC SERPL-MCNC: 115 MG/DL
POTASSIUM SERPL-SCNC: 4.3 MMOL/L (ref 3.4–5.3)
SODIUM SERPL-SCNC: 138 MMOL/L (ref 135–145)
TRIGL SERPL-MCNC: 215 MG/DL

## 2024-08-27 PROCEDURE — 83036 HEMOGLOBIN GLYCOSYLATED A1C: CPT

## 2024-08-27 PROCEDURE — 99000 SPECIMEN HANDLING OFFICE-LAB: CPT | Performed by: PATHOLOGY

## 2024-08-27 PROCEDURE — 87389 HIV-1 AG W/HIV-1&-2 AB AG IA: CPT

## 2024-08-27 PROCEDURE — 80061 LIPID PANEL: CPT | Performed by: PATHOLOGY

## 2024-08-27 PROCEDURE — 36415 COLL VENOUS BLD VENIPUNCTURE: CPT | Performed by: PATHOLOGY

## 2024-08-27 PROCEDURE — 80048 BASIC METABOLIC PNL TOTAL CA: CPT | Performed by: PATHOLOGY

## 2024-12-28 ENCOUNTER — HEALTH MAINTENANCE LETTER (OUTPATIENT)
Age: 64
End: 2024-12-28

## 2025-01-28 ENCOUNTER — TELEPHONE (OUTPATIENT)
Dept: INTERNAL MEDICINE | Facility: CLINIC | Age: 65
End: 2025-01-28
Payer: COMMERCIAL

## 2025-01-28 DIAGNOSIS — E11.59 TYPE 2 DIABETES MELLITUS WITH CIRCULATORY DISORDER, WITHOUT LONG-TERM CURRENT USE OF INSULIN (H): ICD-10-CM

## 2025-01-28 NOTE — TELEPHONE ENCOUNTER
Left Voicemail (1st Attempt) and Sent Mychart (1st Attempt) for the patient to call back and schedule the following:    Appointment type: Lab   Provider: Ordered by PCP   Return date: Next available   Specialty phone number: [phone]

## 2025-01-28 NOTE — TELEPHONE ENCOUNTER
[facility] Call Center    Phone Message    May a detailed message be left on voicemail: yes     Reason for Call: Patient calling to see if his provider will put in orders for labs, so that he can get his labs done. He is requesting a full lab count and A1C. Please call to schedule and or discuss further with the patient.     Action Taken: Message routed to:  [facility]    Travel Screening: Not Applicable     Date of Service:

## 2025-01-30 NOTE — TELEPHONE ENCOUNTER
Left Voicemail (2nd Attempt) for the patient to call back and schedule the following:    Appointment type: Lab   Provider: Ordered by PCP   Return date: Next available   Specialty phone number: [phone]

## 2025-03-14 DIAGNOSIS — E11.59 TYPE 2 DIABETES MELLITUS WITH OTHER CIRCULATORY COMPLICATION, WITHOUT LONG-TERM CURRENT USE OF INSULIN (H): ICD-10-CM

## 2025-03-19 NOTE — TELEPHONE ENCOUNTER
Last Written Prescription:  metFORMIN (GLUCOPHAGE) 500 MG tablet 120 tablet 4 1/11/2024 -- No   Sig - Route: Take 2 tablets (1,000 mg) by mouth 2 times daily (with meals) - Oral     ----------------------  Last Visit Date: 5-2-24  Future Visit Date: none  ----------------------      Refill decision: Medication unable to be refilled by RN due to: Other:  Gap in RF  Overdue lab: A1c  Past due appt        Request from pharmacy:  Requested Prescriptions   Pending Prescriptions Disp Refills    metFORMIN (GLUCOPHAGE) 500 MG tablet [Pharmacy Med Name: METFORMIN 500MG TABLETS] 120 tablet 4     Sig: TAKE 2 TABLETS(1000 MG) BY MOUTH TWICE DAILY WITH MEALS       Biguanide Agents Failed - 3/19/2025  2:56 PM        Failed - Patient has documented A1c within the specified period of time.     If HgbA1C is 8 or greater, it needs to be on file within the past 3 months.  If less than 8, must be on file within the past 6 months.     Recent Labs   Lab Test 08/27/24  1649   A1C 6.2*             Failed - Recent (6 mo) or future (90 days) visit within the authorizing provider's specialty     The patient must have completed an in-person or virtual visit within the past 6 months or has a future visit scheduled within the next 90 days with the authorizing provider s specialty.  Urgent care and e-visits do not quality as an office visit for this protocol.          Passed - Patient is age 10 or older        Passed - Patient does NOT have a diagnosis of CHF.        Passed - Medication is active on med list and the sig matches. RN to manually verify dose and sig if red X/fail.     If the protocol passes (green check), you do not need to verify med dose and sig.    A prescription matches if they are the same clinical intention.    For Example: once daily and every morning are the same.    The protocol can not identify upper and lower case letters as matching and will fail.     For Example: Take 1 tablet (50 mg) by mouth daily     TAKE 1 TABLET  (50 MG) BY MOUTH DAILY    For all fails (red x), verify dose and sig.    If the refill does match what is on file, the RN can still proceed to approve the refill request.       If they do not match, route to the appropriate provider.             Passed - Medication indicated for associated diagnosis     Medication is associated with one or more of the following diagnoses:     Gestational diabetes mellitus     Hyperinsulinar obesity     Hypersecretion of ovarian androgens    Non-alcoholic fatty liver    Polycystic ovarian syndrome               Pre-diabetes (DM 2 prevention)    Type 2 diabetes mellitus     Weight gain, antipsychotic therapy-induced    Impaired fasting glucose          Passed - Has GFR on file in past 12 months and most recent value is normal

## 2025-03-19 NOTE — TELEPHONE ENCOUNTER
Left Voicemail (1st Attempt) and Sent Mychart (1st Attempt) for the patient to call back and schedule the following:    Appointment type: Physical/Return   Provider: PCP  Return date: May   Specialty phone number: [phone]

## 2025-03-21 ENCOUNTER — MYC MEDICAL ADVICE (OUTPATIENT)
Dept: INTERNAL MEDICINE | Facility: CLINIC | Age: 65
End: 2025-03-21
Payer: COMMERCIAL

## 2025-03-21 NOTE — LETTER
3/21/2025       RE: Damian Tejada  [address]  [place] [address]     Jordy Salgado,    The primary care clinic sent you a [employer] message that has not been read yet. Below is the message:    Jordy Salgado,     At your May 2024 visit with Dr Mckeon, he increased your lisinopril to 10mg daily. Were you able to buy a blood pressure monitor?  If yes, are you able to provide us your home blood pressure readings with their respective dates?      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  Please schedule a blood pressure follow up visit with Dr. Mckeon before he leaves at [phone].        Thank you,     Primary Care Clinic

## 2025-04-03 NOTE — TELEPHONE ENCOUNTER
[employer] message not read x2.   Letter mailed out.    Tacos Guerrero CMA ([facility]) at 8:08 AM on 4/3/2025      21:04

## 2025-04-12 ENCOUNTER — OFFICE VISIT (OUTPATIENT)
Dept: OPHTHALMOLOGY | Facility: CLINIC | Age: 65
End: 2025-04-12
Payer: COMMERCIAL

## 2025-04-12 DIAGNOSIS — H52.03 HYPEROPIA OF BOTH EYES: Primary | ICD-10-CM

## 2025-04-12 DIAGNOSIS — H04.123 DRY EYE SYNDROME OF BOTH EYES: ICD-10-CM

## 2025-04-12 DIAGNOSIS — H52.4 PRESBYOPIA: ICD-10-CM

## 2025-04-12 DIAGNOSIS — H52.203 ASTIGMATISM OF BOTH EYES, UNSPECIFIED TYPE: ICD-10-CM

## 2025-04-12 DIAGNOSIS — H25.813 MIXED TYPE AGE-RELATED CATARACT, BOTH EYES: ICD-10-CM

## 2025-04-12 PROCEDURE — 92015 DETERMINE REFRACTIVE STATE: CPT | Performed by: STUDENT IN AN ORGANIZED HEALTH CARE EDUCATION/TRAINING PROGRAM

## 2025-04-12 PROCEDURE — 92004 COMPRE OPH EXAM NEW PT 1/>: CPT | Performed by: STUDENT IN AN ORGANIZED HEALTH CARE EDUCATION/TRAINING PROGRAM

## 2025-04-12 ASSESSMENT — VISUAL ACUITY
OS_PH_SC+: -1
METHOD: SNELLEN - LINEAR
OD_SC+: -3
OS_PH_SC: 20/40
OS_SC: 20/50
OS_SC+: +2
OD_SC: 20/20

## 2025-04-12 ASSESSMENT — REFRACTION_MANIFEST
OS_SPHERE: +1.00
OD_CYLINDER: +0.75
OS_AXIS: 074
OS_CYLINDER: +0.50
OD_SPHERE: PLANO
METHOD_AUTOREFRACTION: 1
OD_ADD: +2.50
OD_CYLINDER: +0.75
OD_AXIS: 106
OS_AXIS: 091
OD_AXIS: 104
OD_SPHERE: PLANO
OS_ADD: +2.50
OS_SPHERE: +0.50
OS_CYLINDER: +0.50

## 2025-04-12 ASSESSMENT — CONF VISUAL FIELD
OS_SUPERIOR_NASAL_RESTRICTION: 0
OS_NORMAL: 1
OD_NORMAL: 1
OS_INFERIOR_TEMPORAL_RESTRICTION: 0
OS_SUPERIOR_TEMPORAL_RESTRICTION: 0
METHOD: COUNTING FINGERS
OD_INFERIOR_TEMPORAL_RESTRICTION: 0
OS_INFERIOR_NASAL_RESTRICTION: 0
OD_INFERIOR_NASAL_RESTRICTION: 0
OD_SUPERIOR_TEMPORAL_RESTRICTION: 0
OD_SUPERIOR_NASAL_RESTRICTION: 0

## 2025-04-12 ASSESSMENT — TONOMETRY
OS_IOP_MMHG: 10
IOP_METHOD: ICARE
OD_IOP_MMHG: 13

## 2025-04-12 ASSESSMENT — EXTERNAL EXAM - LEFT EYE: OS_EXAM: NORMAL

## 2025-04-12 ASSESSMENT — CUP TO DISC RATIO
OD_RATIO: 0.2
OS_RATIO: 0.2

## 2025-04-12 ASSESSMENT — SLIT LAMP EXAM - LIDS
COMMENTS: DCL
COMMENTS: DCL

## 2025-04-12 ASSESSMENT — EXTERNAL EXAM - RIGHT EYE: OD_EXAM: NORMAL

## 2025-04-12 NOTE — PROGRESS NOTES
CC/HPI: Damian Tejada is a 65 year old with PMH of T2DM who presents for LUCY. Pt denied  and denies vision concerns. He does not check blood sugar at home and denies insulin use. Lab Results Component Value Date A1C 6.2 08/27/2024  Does notice vision a little blurry OS maybe five years, sometimes cloud in left eye and blinks then goes aways. No flashes/floaters    POH: none, last eye exam 6 years ago in [facility] no issues at that time; uses readers not distance; had LASIK OS only 15-20 years ago in [place] unsure what kind    PMH:   Past Medical History:   Diagnosis Date    BPH (benign prostatic hyperplasia)     Chronic kidney disease     Coronary artery disease 08/14/2016    anterior STEMI s/p NATY X2    Diabetes (H)     Hyperlipidemia     Hyperlipidemia LDL goal <70     Hypertension       FH: none  SH: [employer] ; no children; lives alone; no alcohol/tobacco    ASSESSMENT & PLAN    # Hyperopia, astigmatism, presbyopia OU  - VA sc limited OS but BCVA with mild correction is 20/20 OU; deferred MRX    # Cataract OU  - visually significant and wants surgery; happy to use readers, doesn't want distance glasses; return for IOL calcs can cat eval; AT QID OU until then; ; doesn't want glasses for distance; monofocal.    # NATY OU  - AT QID OU until follow up    # T2DM wo DR OU   Hemoglobin A1C   Date Value Ref Range Status   08/27/2024 6.2 (H) <5.7 % Final     Comment:     Normal <5.7%   Prediabetes 5.7-6.4%    Diabetes 6.5% or higher     Note: Adopted from ADA consensus guidelines.   06/01/2021 6.3 (H) 0 - 5.6 % Final     Comment:     Normal <5.7% Prediabetes 5.7-6.4%  Diabetes 6.5% or higher - adopted from ADA   consensus guidelines.        - Discussed importance of strict blood pressure and glucose control in order to reduce the risk of developing diabetic retinopathy  - Recommend annual dilated eye exam      RTC 1-2w  cataract eval IOL calcs, BAT, OCT mac    Attending Physician  Attestation: Complete documentation of historical and exam elements from today's encounter can be found in the full encounter summary report (not reduplicated in this progress note). I personally obtained the chief complaint(s) and history of present illness. I confirmed and edited as necessary the review of systems, past medical/surgical history, family history, social history, and examination findings as documented by others; and I examined the patient myself. I personally reviewed the relevant tests, images, and reports as documented above. I formulated and edited as necessary the assessment and plan and discussed the findings and management plan with the patient.  Tod Osborne MD

## 2025-04-12 NOTE — NURSING NOTE
Chief Complaints and History of Present Illnesses   Patient presents with    COMPREHENSIVE EYE EXAM     Chief Complaint(s) and History of Present Illness(es)       COMPREHENSIVE EYE EXAM              Course: stable    Associated symptoms: Negative for glare, haloes, dryness, eye pain, headache, flashes and floaters    Treatments tried: no treatments              Comments    Pt denied  and denies vision concerns. He does not check blood sugar at home and denies insulin use.     Lab Results       Component                Value               Date                       A1C                      6.2                 08/27/2024                 A1C                      6.3                 03/18/2024                 A1C                      6.7                 01/11/2024                 A1C                      7.2                 08/31/2023                 A1C                      6.1                 11/10/2022                 A1C                      6.3                 06/01/2021                 A1C                      6.3                 01/29/2019                Jenn Jay OA 8:43 AM April 12, 2025

## 2025-04-13 ENCOUNTER — HEALTH MAINTENANCE LETTER (OUTPATIENT)
Age: 65
End: 2025-04-13

## 2025-04-15 ENCOUNTER — TELEPHONE (OUTPATIENT)
Dept: OPHTHALMOLOGY | Facility: CLINIC | Age: 65
End: 2025-04-15
Payer: COMMERCIAL

## 2025-04-15 NOTE — TELEPHONE ENCOUNTER
Left Voicemail (1st Attempt) for the patient to call back and schedule the following:    Appointment type: NEW CATARACT  Provider: , , , Dr. Rosanna Brock,    Return date: next available     Eye Clinic phone number: [phone]  Additional appointment(s) needed: NONE  Additional Notes: Return in about 1 week (around 4/19/2025) for RTC 1-2w  cataract eval IOL calcs, BAT, OCT mac.- Per      Provided Eye Clinic number for scheduling for sooner options at [facility] Location.

## 2025-04-21 NOTE — TELEPHONE ENCOUNTER
FUTURE VISIT INFORMATION      FUTURE VISIT INFORMATION:  Date: 6/10/2025  Time: 8:40 AM  Location: [facility]  REFERRAL INFORMATION:  Referring provider: Dr. Osborne  Referring providers clinic: [facility]  Reason for visit/diagnosis: Cataract Eval IOL Calcs, BAT, OCT Mac    RECORDS REQUESTED FROM:       Clinic name Comments Records Status Imaging Status   [facility] 4/12/25 - EYE OV with Dr. Osborne  5/2/24 - [facility] OV with Dr. Linda Arellano

## 2025-05-14 ENCOUNTER — OFFICE VISIT (OUTPATIENT)
Dept: INTERNAL MEDICINE | Facility: CLINIC | Age: 65
End: 2025-05-14
Payer: COMMERCIAL

## 2025-05-14 ENCOUNTER — LAB (OUTPATIENT)
Dept: LAB | Facility: CLINIC | Age: 65
End: 2025-05-14
Payer: COMMERCIAL

## 2025-05-14 ENCOUNTER — RESULTS FOLLOW-UP (OUTPATIENT)
Dept: INTERNAL MEDICINE | Facility: CLINIC | Age: 65
End: 2025-05-14

## 2025-05-14 VITALS
HEART RATE: 74 BPM | SYSTOLIC BLOOD PRESSURE: 135 MMHG | HEIGHT: 67 IN | WEIGHT: 163.4 LBS | DIASTOLIC BLOOD PRESSURE: 75 MMHG | BODY MASS INDEX: 25.65 KG/M2 | OXYGEN SATURATION: 97 % | RESPIRATION RATE: 15 BRPM

## 2025-05-14 DIAGNOSIS — I25.10 CORONARY ARTERY DISEASE INVOLVING NATIVE CORONARY ARTERY OF NATIVE HEART WITHOUT ANGINA PECTORIS: ICD-10-CM

## 2025-05-14 DIAGNOSIS — I10 ESSENTIAL HYPERTENSION: ICD-10-CM

## 2025-05-14 DIAGNOSIS — N18.2 CKD (CHRONIC KIDNEY DISEASE) STAGE 2, GFR 60-89 ML/MIN: ICD-10-CM

## 2025-05-14 DIAGNOSIS — E11.59 TYPE 2 DIABETES MELLITUS WITH OTHER CIRCULATORY COMPLICATION, WITHOUT LONG-TERM CURRENT USE OF INSULIN (H): ICD-10-CM

## 2025-05-14 DIAGNOSIS — I25.2 OLD MYOCARDIAL INFARCTION: ICD-10-CM

## 2025-05-14 DIAGNOSIS — Z12.11 SCREENING FOR COLON CANCER: ICD-10-CM

## 2025-05-14 DIAGNOSIS — Z12.11 SCREEN FOR COLON CANCER: Primary | ICD-10-CM

## 2025-05-14 DIAGNOSIS — E11.59 TYPE 2 DIABETES MELLITUS WITH OTHER CIRCULATORY COMPLICATION, WITHOUT LONG-TERM CURRENT USE OF INSULIN (H): Primary | ICD-10-CM

## 2025-05-14 LAB
ALBUMIN SERPL BCG-MCNC: 4 G/DL (ref 3.5–5.2)
ALP SERPL-CCNC: 126 U/L (ref 40–150)
ALT SERPL W P-5'-P-CCNC: 23 U/L (ref 0–70)
ANION GAP SERPL CALCULATED.3IONS-SCNC: 11 MMOL/L (ref 7–15)
AST SERPL W P-5'-P-CCNC: 29 U/L (ref 0–45)
BILIRUB SERPL-MCNC: 0.4 MG/DL
BUN SERPL-MCNC: 20.7 MG/DL (ref 8–23)
CALCIUM SERPL-MCNC: 9.3 MG/DL (ref 8.8–10.4)
CHLORIDE SERPL-SCNC: 109 MMOL/L (ref 98–107)
CHOLEST SERPL-MCNC: 136 MG/DL
CREAT SERPL-MCNC: 1.11 MG/DL (ref 0.67–1.17)
EGFRCR SERPLBLD CKD-EPI 2021: 75 ML/MIN/1.73M2
ERYTHROCYTE [DISTWIDTH] IN BLOOD BY AUTOMATED COUNT: 13.9 % (ref 10–15)
EST. AVERAGE GLUCOSE BLD GHB EST-MCNC: 134 MG/DL
FASTING STATUS PATIENT QL REPORTED: ABNORMAL
FASTING STATUS PATIENT QL REPORTED: NORMAL
GLUCOSE SERPL-MCNC: 143 MG/DL (ref 70–99)
HBA1C MFR BLD: 6.3 %
HCO3 SERPL-SCNC: 24 MMOL/L (ref 22–29)
HCT VFR BLD AUTO: 42.9 % (ref 40–53)
HDLC SERPL-MCNC: 60 MG/DL
HGB BLD-MCNC: 13.6 G/DL (ref 13.3–17.7)
LDLC SERPL CALC-MCNC: 63 MG/DL
MCH RBC QN AUTO: 25.2 PG (ref 26.5–33)
MCHC RBC AUTO-ENTMCNC: 31.7 G/DL (ref 31.5–36.5)
MCV RBC AUTO: 79 FL (ref 78–100)
NONHDLC SERPL-MCNC: 76 MG/DL
PLATELET # BLD AUTO: 228 10E3/UL (ref 150–450)
POTASSIUM SERPL-SCNC: 4.2 MMOL/L (ref 3.4–5.3)
PROT SERPL-MCNC: 7.3 G/DL (ref 6.4–8.3)
RBC # BLD AUTO: 5.4 10E6/UL (ref 4.4–5.9)
SODIUM SERPL-SCNC: 144 MMOL/L (ref 135–145)
TRIGL SERPL-MCNC: 66 MG/DL
TSH SERPL DL<=0.005 MIU/L-ACNC: 1.9 UIU/ML (ref 0.3–4.2)
WBC # BLD AUTO: 5.8 10E3/UL (ref 4–11)

## 2025-05-14 PROCEDURE — 3078F DIAST BP <80 MM HG: CPT

## 2025-05-14 PROCEDURE — 83036 HEMOGLOBIN GLYCOSYLATED A1C: CPT

## 2025-05-14 PROCEDURE — 99000 SPECIMEN HANDLING OFFICE-LAB: CPT | Performed by: PATHOLOGY

## 2025-05-14 PROCEDURE — 3075F SYST BP GE 130 - 139MM HG: CPT

## 2025-05-14 PROCEDURE — 82570 ASSAY OF URINE CREATININE: CPT

## 2025-05-14 PROCEDURE — 99214 OFFICE O/P EST MOD 30 MIN: CPT

## 2025-05-14 RX ORDER — LISINOPRIL 10 MG/1
10 TABLET ORAL DAILY
Qty: 90 TABLET | Refills: 3 | Status: SHIPPED | OUTPATIENT
Start: 2025-05-14

## 2025-05-14 RX ORDER — CARVEDILOL 12.5 MG/1
6.25 TABLET ORAL 2 TIMES DAILY WITH MEALS
Qty: 90 TABLET | Refills: 3 | Status: SHIPPED | OUTPATIENT
Start: 2025-05-14

## 2025-05-14 RX ORDER — ATORVASTATIN CALCIUM 80 MG/1
80 TABLET, FILM COATED ORAL DAILY
Qty: 90 TABLET | Refills: 11 | Status: SHIPPED | OUTPATIENT
Start: 2025-05-14

## 2025-05-14 RX ORDER — ASPIRIN 81 MG/1
81 TABLET ORAL DAILY
Qty: 90 TABLET | Refills: 3 | Status: SHIPPED | OUTPATIENT
Start: 2025-05-14

## 2025-05-14 NOTE — PROGRESS NOTES
"  {PROVIDER CHARTING PREFERENCE:920906}    Subjective   Damian is a 65 year old, presenting for the following health issues:  Follow Up      5/14/2025     4:08 PM   Additional Questions   Roomed by      History of Present Illness       Hypertension: He presents for follow up of hypertension.  He does not check blood pressure  regularly outside of the clinic. Outside blood pressures have been over 140/90. He follows a low salt diet.     He eats 0-1 servings of fruits and vegetables daily.He consumes 0 sweetened beverage(s) daily.He exercises with enough effort to increase his heart rate 9 or less minutes per day.  He exercises with enough effort to increase his heart rate 3 or less days per week. He is missing 3 dose(s) of medications per week.        Damian Tejada presents to the clinic today for a follow-up visit. He was last seen in clinic on 5/02/24. He has a history of CAD, STEMI s/p NATY x2, hypertension, hyperlipidemia, type 2 DM, CKD, psoriasis, and seborrheic keratosis.     Works as a . He would like us to send A1c result to [facility] [employer] at [phone]      A1c 6.2 on 8/27/24. On metformin 1000 mg BID.     Colon cancer screening *** 6 years ago, [facility]        Review of Systems  Constitutional, HEENT, cardiovascular, pulmonary, gi and gu systems are negative, except as otherwise noted.      Objective    /75 (BP Location: Right arm, Patient Position: Sitting, Cuff Size: Adult Regular)   Pulse 74   Resp 15   Ht 1.702 m (5' 7.01\")   Wt 74.1 kg (163 lb 6.4 oz)   SpO2 97%   BMI 25.59 kg/m    Body mass index is 25.59 kg/m .  Physical Exam   {Exam List (Optional):703310}    {Diagnostic Test Results (Optional):312235}        Signed Electronically by: Mishel Katz NP  {Email feedback regarding this note to [email or web address]   :660822}  " "with enough effort to increase his heart rate 9 or less minutes per day.  He exercises with enough effort to increase his heart rate 3 or less days per week. He is missing 3 dose(s) of medications per week.        Damian Tejada presents to the clinic today for a follow-up visit. He was last seen in clinic on 5/02/24. He has a history of CAD, STEMI s/p NATY x2, hypertension, hyperlipidemia, type 2 DM, CKD, psoriasis, and seborrheic keratosis.     Works as a . He would like us to send A1c result to [facility] [employer] at [phone]. He overall feels well without concerns.     A1c 6.2 on 8/27/24. On metformin 1000 mg BID.     Colon cancer screening - 6 years ago, [facility] per his report. Colonoscopy completed 2019 per chart review, recommendation was for repeat in 5 years. Due to complete through [facility].       Review of Systems  Constitutional, HEENT, cardiovascular, pulmonary, gi and gu systems are negative, except as otherwise noted.      Objective    /75 (BP Location: Right arm, Patient Position: Sitting, Cuff Size: Adult Regular)   Pulse 74   Resp 15   Ht 1.702 m (5' 7.01\")   Wt 74.1 kg (163 lb 6.4 oz)   SpO2 97%   BMI 25.59 kg/m    Body mass index is 25.59 kg/m .  Physical Exam   GENERAL: alert and no distress  EYES: Eyes grossly normal to inspection, PERRL and conjunctivae and sclerae normal  RESP: lungs clear to auscultation - no rales, rhonchi or wheezes  CV: regular rate and rhythm, normal S1 S2, no S3 or S4, no murmur, click or rub, no peripheral edema  MS: no gross musculoskeletal defects noted, no edema  NEURO: Normal strength and tone, mentation intact and speech normal  PSYCH: mentation appears normal, affect normal/bright            Signed Electronically by: Mishel Katz NP    "

## 2025-05-15 LAB
CREAT UR-MCNC: 201 MG/DL
MICROALBUMIN UR-MCNC: <12 MG/L
MICROALBUMIN/CREAT UR: NORMAL MG/G{CREAT}

## 2025-06-10 ENCOUNTER — OFFICE VISIT (OUTPATIENT)
Dept: OPHTHALMOLOGY | Facility: CLINIC | Age: 65
End: 2025-06-10
Payer: COMMERCIAL

## 2025-06-10 ENCOUNTER — PRE VISIT (OUTPATIENT)
Dept: OPHTHALMOLOGY | Facility: CLINIC | Age: 65
End: 2025-06-10

## 2025-06-10 DIAGNOSIS — H02.88A MEIBOMIAN GLAND DYSFUNCTION (MGD) OF UPPER AND LOWER LIDS OF BOTH EYES: ICD-10-CM

## 2025-06-10 DIAGNOSIS — H16.142 PUNCTATE EPITHELIAL KERATOPATHY OF LEFT EYE: ICD-10-CM

## 2025-06-10 DIAGNOSIS — H25.019 CORTICAL SENILE CATARACT, UNSPECIFIED LATERALITY: Primary | ICD-10-CM

## 2025-06-10 DIAGNOSIS — E11.9 TYPE 2 DIABETES MELLITUS WITHOUT OPHTHALMIC MANIFESTATIONS (H): ICD-10-CM

## 2025-06-10 DIAGNOSIS — H02.88B MEIBOMIAN GLAND DYSFUNCTION (MGD) OF UPPER AND LOWER LIDS OF BOTH EYES: ICD-10-CM

## 2025-06-10 DIAGNOSIS — H52.00 HYPERMETROPIA, UNSPECIFIED LATERALITY: ICD-10-CM

## 2025-06-10 ASSESSMENT — TONOMETRY
OS_IOP_MMHG: 8
IOP_METHOD: ICARE
OD_IOP_MMHG: 8

## 2025-06-10 ASSESSMENT — CUP TO DISC RATIO
OD_RATIO: 0.2
OS_RATIO: 0.2

## 2025-06-10 ASSESSMENT — EXTERNAL EXAM - RIGHT EYE: OD_EXAM: NORMAL

## 2025-06-10 ASSESSMENT — CONF VISUAL FIELD
OD_SUPERIOR_NASAL_RESTRICTION: 0
OD_INFERIOR_TEMPORAL_RESTRICTION: 0
OS_SUPERIOR_NASAL_RESTRICTION: 0
OS_SUPERIOR_TEMPORAL_RESTRICTION: 0
OD_SUPERIOR_TEMPORAL_RESTRICTION: 0
METHOD: COUNTING FINGERS
OS_INFERIOR_NASAL_RESTRICTION: 0
OS_NORMAL: 1
OD_NORMAL: 1
OD_INFERIOR_NASAL_RESTRICTION: 0
OS_INFERIOR_TEMPORAL_RESTRICTION: 0

## 2025-06-10 ASSESSMENT — REFRACTION_MANIFEST
OS_SPHERE: +0.50
OS_ADD: +2.50
OD_ADD: +2.50
OD_CYLINDER: +0.75
OD_SPHERE: PLANO
OD_AXIS: 106
OS_CYLINDER: +0.50
OS_AXIS: 074

## 2025-06-10 ASSESSMENT — VISUAL ACUITY
OS_SC+: -1
OD_SC: 20/25
OS_PH_SC: 20/25
OS_PH_SC+: -2+2
OS_BAT_MED: 20/50-2+1
OD_SC+: -2
METHOD: SNELLEN - LINEAR
OD_BAT_MED: 20/25-2
OS_SC: 20/50

## 2025-06-10 ASSESSMENT — EXTERNAL EXAM - LEFT EYE: OS_EXAM: NORMAL

## 2025-06-10 NOTE — NURSING NOTE
"Chief Complaints and History of Present Illnesses   Patient presents with    Cataract Evaluation     Damian Tejada is being seen for a consult today by the request of Dr. Osborne for Cataract Evaluation.      Chief Complaint(s) and History of Present Illness(es)       Cataract Evaluation              Associated symptoms: Negative for blurred vision, glare and haloes    Pain scale: 0/10    Comments: Damian Tejada is being seen for a consult today by the request of Dr. Osborne for Cataract Evaluation.               Comments    Pt doesn't wear any distance Rx, only SV readers.   No vision complaints, \"my vision is normal\"   He is interested in cataract surgery.     Ocular Meds:   Visine Red eye 2-3 times daily    DM2  LBS : Does not monitor.   Lab Results       Component                Value               Date                       A1C                      6.3                 05/14/2025                 A1C                      6.2                 08/27/2024                 A1C                      6.3                 03/18/2024                 A1C                      6.7                 01/11/2024                 A1C                      7.2                 08/31/2023                 A1C                      6.3                 06/01/2021                 A1C                      6.3                 01/29/2019               Vince Yost 8:37 AM Mary Ellen 10, 2025                    "

## 2025-06-10 NOTE — PROGRESS NOTES
"HPI  Damian Tejada is a 65 year old male here for cataract evaluation, last seen by Dr. Osborne.  He does not note a significant change in vision and is meeting his activities of daily living with reading glasses.  Started using drops since the last visit with Dr. Osborne but previously did not use any drops.  He does not feel like his eyes are irritated.  HPI       Cataract Evaluation    Associated symptoms include Negative for blurred vision, glare and haloes.  Pain was noted as 0/10. Additional comments: Damian Tejada is being seen for a consult today by the request of Dr. Osborne for Cataract Evaluation.              Comments    Pt doesn't wear any distance Rx, only SV readers.   No vision complaints, \"my vision is normal\"   He is interested in cataract surgery.     Ocular Meds:   Visine Red eye 2-3 times daily    DM2  LBS : Does not monitor.   Lab Results       Component                Value               Date                       A1C                      6.3                 05/14/2025                 A1C                      6.2                 08/27/2024                 A1C                      6.3                 03/18/2024                 A1C                      6.7                 01/11/2024                 A1C                      7.2                 08/31/2023                 A1C                      6.3                 06/01/2021                 A1C                      6.3                 01/29/2019               Vince Yost 8:37 AM Mary Ellen 10, 2025            Last edited by Vince Yost on 6/10/2025  8:38 AM.            PMH:   Past Medical History:   Diagnosis Date    BPH (benign prostatic hyperplasia)     Chronic kidney disease     Coronary artery disease 08/14/2016    anterior STEMI s/p NATY X2    Diabetes (H)     Hyperlipidemia     Hyperlipidemia LDL goal <70     Hypertension       POH: Glasses for reading, mild hypermetropia/presbyopia, cataracts, dcl, no surgery, no trauma  Oc Meds: visine red out twice a day   FH: " Denies any glaucoma, age related macular degeneration, or other known eye diseases .         Assessment & Plan      1. Cortical senile cataract, unspecified laterality - Both Eyes    2. Hypermetropia, unspecified laterality - Left Eye    3. Type 2 diabetes mellitus without ophthalmic manifestations (H) - Both Eyes    4. Meibomian gland dysfunction (MGD) of upper and lower lids of both eyes - Both Eyes    5. 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      (H25.019) Cortical senile cataract, unspecified laterality - Both Eyes  (primary encounter diagnosis)  Comment: visually significant left eye, patient meeting visual needs  Discussed natural history of cataract  Plan: Observe, call with vision changes    (H52.00) Hypermetropia, unspecified laterality - Left Eye  Comment: Meeting needs with reading glasses only  Plan: manifest refraction done and prescription for glasses on file, optional to fill    (E11.9) Type 2 diabetes mellitus without ophthalmic manifestations (H) - Both Eyes  Comment: Diabetes Mellitus 2 w/o retinopathy     Lab Results   Component Value Date    A1C 6.3 05/14/2025    A1C 6.2 08/27/2024    A1C 6.3 03/18/2024      Discussed the importance of tight blood glucose, blood pressure, and cholesterol  control in the prevention of diabetic retinopathy.   Plan:  Recommend yearly dilated eye exam.     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  -----------------------------------------------------------------------------------        Patient disposition:    Return in about 1 year (around 6/10/2026) for Comprehensive Exam, follow up- cataract, dm .       Complete documentation of historical and exam elements from today's encounter can be found in the full encounter summary report (not reduplicated in this progress note). I personally obtained the chief complaint(s) and history of present illness.  I have confirmed and edited as necessary the CC, HPI, PMH/PSH, social history, FMH, ROS, and exam/neuro findings as obtained by the technician or others. I have examined this patient myself and I personally viewed the image(s) and studies listed above and the documentation reflects my findings and interpretation.  I formulated and edited as necessary the assessment and plan and discussed the findings and management plan with the patient and family.     Liz Key MD

## 2025-06-15 ENCOUNTER — HEALTH MAINTENANCE LETTER (OUTPATIENT)
Age: 65
End: 2025-06-15

## 2025-08-17 ENCOUNTER — HEALTH MAINTENANCE LETTER (OUTPATIENT)
Age: 65
End: 2025-08-17